# Patient Record
Sex: FEMALE | Race: BLACK OR AFRICAN AMERICAN | Employment: OTHER | URBAN - METROPOLITAN AREA
[De-identification: names, ages, dates, MRNs, and addresses within clinical notes are randomized per-mention and may not be internally consistent; named-entity substitution may affect disease eponyms.]

---

## 2017-02-02 ENCOUNTER — OFFICE VISIT (OUTPATIENT)
Dept: INTERNAL MEDICINE CLINIC | Age: 62
End: 2017-02-02

## 2017-02-02 VITALS
WEIGHT: 197.6 LBS | RESPIRATION RATE: 16 BRPM | HEIGHT: 65 IN | SYSTOLIC BLOOD PRESSURE: 112 MMHG | OXYGEN SATURATION: 98 % | HEART RATE: 63 BPM | BODY MASS INDEX: 32.92 KG/M2 | DIASTOLIC BLOOD PRESSURE: 68 MMHG | TEMPERATURE: 98.2 F

## 2017-02-02 DIAGNOSIS — M25.511 ACUTE PAIN OF RIGHT SHOULDER: ICD-10-CM

## 2017-02-02 DIAGNOSIS — E11.9 TYPE 2 DIABETES MELLITUS WITHOUT COMPLICATION, WITHOUT LONG-TERM CURRENT USE OF INSULIN (HCC): Primary | ICD-10-CM

## 2017-02-02 DIAGNOSIS — Z11.59 NEED FOR HEPATITIS C SCREENING TEST: ICD-10-CM

## 2017-02-02 DIAGNOSIS — I10 ESSENTIAL HYPERTENSION: ICD-10-CM

## 2017-02-02 RX ORDER — DICLOFENAC SODIUM 10 MG/G
GEL TOPICAL 4 TIMES DAILY
Qty: 100 G | Refills: 1 | Status: SHIPPED | OUTPATIENT
Start: 2017-02-02 | End: 2018-05-22

## 2017-02-02 NOTE — MR AVS SNAPSHOT
Visit Information Date & Time Provider Department Dept. Phone Encounter #  
 2/2/2017  8:15 AM Clau Galeas [address] [facility] [address] [phone] [id number] Follow-up Instructions Return in about 3 months (around 5/2/2017) for follow up diabetes. Your Appointments 5/19/2017  8:30 AM  
ROUTINE CARE with Belia Cortes MD  
[facility] Appt Note: well woman cp 0 sb 5/13/16  
 [address] [id number] [address] [place] [address] [address] [address] Upcoming Health Maintenance Date Due Hepatitis C Screening 1955 FOOT EXAM Q1 3/22/1965 Pneumococcal 19-64 Medium Risk (1 of 1 - PPSV23) 3/22/1974 DTaP/Tdap/Td series (1 - Tdap) 3/22/1976 ZOSTER VACCINE AGE 60> 3/22/2015 EYE EXAM RETINAL OR DILATED Q1 7/9/2015 INFLUENZA AGE 9 TO ADULT 8/1/2016 PAP AKA CERVICAL CYTOLOGY 2/4/2017 HEMOGLOBIN A1C Q6M 3/13/2017 MICROALBUMIN Q1 9/13/2017 LIPID PANEL Q1 9/13/2017 BREAST CANCER SCRN MAMMOGRAM 7/25/2018 COLONOSCOPY 3/12/2024 Allergies as of 2/2/2017  Review Complete On: 2/2/2017 By: Ananya Villarreal Severity Noted Reaction Type Reactions Percocet [Oxycodone-acetaminophen]  06/29/2011    Hives Current Immunizations  Never Reviewed No immunizations on file. Not reviewed this visit You Were Diagnosed With   
  
 Codes Comments Type 2 diabetes mellitus without complication, without long-term current use of insulin (HCC)    -  Primary ICD-10-CM: E11.9 ICD-9-CM: 250.00 Essential hypertension     ICD-10-CM: I10 
ICD-9-CM: 401.9 Need for hepatitis C screening test     ICD-10-CM: Z11.59 
ICD-9-CM: V73.89 Acute pain of right shoulder     ICD-10-CM: M25.511 ICD-9-CM: 719.41 Vitals BP Pulse Temp Resp Height(growth percentile) Weight(growth percentile) 112/68 (BP 1 Location: Left arm, BP Patient Position: Sitting) 63 98.2 °F (36.8 °C) (Oral) 16 5' 5\" (1.651 m) 197 lb 9.6 oz (89.6 kg) LMP SpO2 BMI OB Status Smoking Status 03/25/1999 98% 32.88 kg/m2 Hysterectomy Former Smoker BMI and BSA Data Body Mass Index Body Surface Area  
 32.88 kg/m 2 2.03 m 2 Preferred Pharmacy Pharmacy Name Phone [address] [place] [address], [facility] Trey Goddard 45 [phone] Your Updated Medication List  
  
   
This list is accurate as of: 2/2/17  9:08 AM.  Always use your most recent med list.  
  
  
  
  
 atenolol 25 mg tablet Commonly known as:  TENORMIN  
TAKE ONE TABLET BY MOUTH DAILY CRESTOR 10 mg tablet Generic drug:  rosuvastatin TAKE ONE TABLET BY MOUTH EVERY EVENING  
  
 diclofenac 1 % Gel Commonly known as:  VOLTAREN Apply  to affected area four (4) times daily. FISH OIL 1,000 mg Cap Generic drug:  omega-3 fatty acids-vitamin e Take 1 Cap by mouth. glucose blood VI test strips strip Commonly known as:  ONETOUCH ULTRA TEST Check blood sugars daily  Indications: twice daily  
  
 hydroCHLOROthiazide 25 mg tablet Commonly known as:  HYDRODIURIL  
TAKE ONE TABLET BY MOUTH DAILY  
  
 lisinopril 40 mg tablet Commonly known as:  PRINIVIL, ZESTRIL  
TAKE ONE TABLET BY MOUTH DAILY  
  
 metFORMIN 500 mg tablet Commonly known as:  GLUCOPHAGE  
TAKE ONE TABLET BY MOUTH DAILY Nisoldipine 34 mg Tb24 SR tablet Commonly known as:  SULAR  
TAKE ONE TABLET BY MOUTH DAILY  
  
 VITAMIN D2 50,000 unit capsule Generic drug:  ergocalciferol Take 1,000 Units by mouth. Prescriptions Sent to Pharmacy Refills  
 diclofenac (VOLTAREN) 1 % gel 1 Sig: Apply  to affected area four (4) times daily. Class: Normal  
 Pharmacy: [address] [place] [address] #: [phone] Route: Topical  
  
We Performed the Following HEMOGLOBIN A1C WITH EAG [70808 CPT(R)] HEPATITIS C AB [51819 CPT(R)] METABOLIC PANEL, COMPREHENSIVE [60691 CPT(R)] Follow-up Instructions Return in about 3 months (around 5/2/2017) for follow up diabetes. Introducing [facility] & HEALTH SERVICES! Dear Victor Manuel Rey: Thank you for requesting a "[employer] account. Our records indicate that you already have an active "[employer] account. You can access your account anytime at [email or web address] Did you know that you can access your hospital and ER discharge instructions at any time in "[employer]? You can also review all of your test results from your hospital stay or ER visit. Additional Information If you have questions, please visit the Frequently Asked Questions section of the "[employer] website at [email or web address] Remember, "[employer] is NOT to be used for urgent needs. For medical emergencies, dial 911. Now available from your iPhone and Android! Please provide this summary of care documentation to your next provider. Your primary care clinician is listed as Shola Mcclendon. If you have any questions after today's visit, please call [phone].

## 2017-02-02 NOTE — PROGRESS NOTES
1. Have you been to the ER, urgent care clinic since your last visit? Hospitalized since your last visit?no    2. Have you seen or consulted any other health care providers outside of the [facility]"" since your last visit? Include any pap smears or colon screening.  no

## 2017-02-02 NOTE — PROGRESS NOTES
SUBJECTIVE:   Ms. Aspen Trevino is a 64 y.o. female who is here for follow up of DM. Pt c/o right shoulder pain x one month. Pt states she was dx with frozen shoulder a few years ago. Pt notes the episodes normally go away after a while, but this one has not gone away. Pt denies decreased ROM, but notes it is painful to move her arm backwards. Pt reports the pain is worse with holding out at 90 degrees rather than full abduction. Pt endorses taking Advil without relief of pain. Pt's BP is well controlled at 112/68 today. Pt reports occasional lightheadedness and stumbling. Pt denies checking her BP during the episodes. Pt has lost 4 lbs since 9/13/16. Pt states she has an ophthalmology appointment on 2/13/17. Pt denies exercising \"as much as she should. \" Pt claims she will exercise regularly for a while, but then will stop. ROUTINE HEALTH MAINTENANCE:  PREVENTIVE:  Tdap: due, pt will consider. Pneumonia vaccine: pt will consider  Zostavax: declined today  Flu: declined today  Hep C: ordered today  Microfilament: scheduled  Ophthalmology: scheduled     At this time, she is otherwise doing well and has brought no other complaints to my attention today. For a list of the medical issues addressed today, see the assessment and plan below. PMH:   Past Medical History   Diagnosis Date    Diabetes ([facility] [address]     Hypercholesterolemia     Hypertension     Other ill-defined conditions(799.89)      high cholesterol     PSH:  has a past surgical history that includes tubal ligation; hysterectomy (3/25/99); and orthopaedic. All: is allergic to percocet [oxycodone-acetaminophen]. MEDS:   Current Outpatient Prescriptions   Medication Sig    diclofenac (VOLTAREN) 1 % gel Apply  to affected area four (4) times daily.     atenolol (TENORMIN) 25 mg tablet TAKE ONE TABLET BY MOUTH DAILY    lisinopril (PRINIVIL, ZESTRIL) 40 mg tablet TAKE ONE TABLET BY MOUTH DAILY    Nisoldipine 34 mg Tb24 TAKE ONE TABLET BY MOUTH DAILY    CRESTOR 10 mg tablet TAKE ONE TABLET BY MOUTH EVERY EVENING    glucose blood VI test strips (ONETOUCH ULTRA TEST) strip Check blood sugars daily  Indications: twice daily    hydrochlorothiazide (HYDRODIURIL) 25 mg tablet TAKE ONE TABLET BY MOUTH DAILY    metFORMIN (GLUCOPHAGE) 500 mg tablet TAKE ONE TABLET BY MOUTH DAILY    ergocalciferol (VITAMIN D2) 50,000 unit capsule Take 1,000 Units by mouth.  omega-3 fatty acids-vitamin e (FISH OIL) 1,000 mg cap Take 1 Cap by mouth. No current facility-administered medications for this visit. FH: family history includes Cancer in her father; Cancer (age of onset: 64) in her mother; Diabetes in her sister; Hypertension in her brother, brother, father, mother, sister, sister, and sister. SH:  reports that she quit smoking about 26 years ago. She quit after 15.00 years of use. She has never used smokeless tobacco. She reports that she drinks alcohol. Review of Systems - History obtained from the patient  General ROS: negative  Psychological ROS: negative  Ophthalmic ROS: negative  ENT ROS: negative  Respiratory ROS: no cough, shortness of breath, or wheezing  Cardiovascular ROS: no chest pain or dyspnea on exertion  Gastrointestinal ROS: no abdominal pain, change in bowel habits, or black or bloody stools  Genito-Urinary ROS: negative  Musculoskeletal ROS: +right shoulder pain, otherwise negative  Neurological ROS: negative  Dermatological ROS: negative    OBJECTIVE:   Vitals:   Visit Vitals    /68 (BP 1 Location: Left arm, BP Patient Position: Sitting)    Pulse 63    Temp 98.2 °F (36.8 °C) (Oral)    Resp 16    Ht 5' 5\" (1.651 m)    Wt 197 lb 9.6 oz (89.6 kg)    LMP 03/25/1999    SpO2 98%    BMI 32.88 kg/m2      Gen: Pleasant 64 y.o.  female in NAD. HEENT: NC/AT. HEART: RRR, No M/G/R. LUNGS: CTAB No W/R. EXTREMITIES: Warm. No C/C/E. NEURO: Alert and oriented x 3. Cranial nerves grossly intact.   No focal sensory or motor deficits noted. SKIN: Warm. Dry. No rashes or other lesions noted. ASSESSMENT/ PLAN:     Haydee Pedro was seen today for diabetes, follow-up, shoulder pain, other and other. Diagnoses and all orders for this visit:    Type 2 diabetes mellitus without complication, without long-term current use of insulin (HCC)  -     METABOLIC PANEL, COMPREHENSIVE  -     HEMOGLOBIN A1C WITH EAG    Essential hypertension  -     METABOLIC PANEL, COMPREHENSIVE    Need for hepatitis C screening test  -     HEPATITIS C AB    Acute pain of right shoulder  -     diclofenac (VOLTAREN) 1 % gel; Apply  to affected area four (4) times daily. Pt was advised to check her BP at home or work for 3-4 days, and report these readings back to me. If pt's BP is less than 112/68, then she was instructed to cut HCTZ 25mg in half. I ordered Hgb A1C, CMP, and Hep C labs for monitoring of DM, medication, and hepatitis C screening. Pt was advised to avoid heavy lifting and and decrease motion of the right shoulder for two weeks. I prescribed Voltaren 1% gel QID prn for right shoulder pain. Pt was instructed to use 2g at a time and apply with a cotton ball. She reports she is not ready to see orthopedics or do xrays at this time. Pt was encouraged to continue exercising and following a diabetic diet. Pt will f/u in 3 months for DM. Follow-up Disposition:  Return in about 3 months (around 5/2/2017) for follow up diabetes. I have reviewed the patient's medications and risks/side effects/benefits were discussed. Diagnosis(-es) explained to patient and questions answered. Literature provided where appropriate.      Written by Kori Elder, as dictated by Lucy Hayes MD.

## 2017-02-03 LAB
ALBUMIN SERPL-MCNC: 4.8 G/DL (ref 3.6–4.8)
ALBUMIN/GLOB SERPL: 1.8 {RATIO} (ref 1.1–2.5)
ALP SERPL-CCNC: 80 IU/L (ref 39–117)
ALT SERPL-CCNC: 24 IU/L (ref 0–32)
AST SERPL-CCNC: 21 IU/L (ref 0–40)
BILIRUB SERPL-MCNC: 0.3 MG/DL (ref 0–1.2)
BUN SERPL-MCNC: 9 MG/DL (ref 8–27)
BUN/CREAT SERPL: 13 (ref 11–26)
CALCIUM SERPL-MCNC: 9.9 MG/DL (ref 8.7–10.3)
CHLORIDE SERPL-SCNC: 100 MMOL/L (ref 96–106)
CO2 SERPL-SCNC: 23 MMOL/L (ref 18–29)
CREAT SERPL-MCNC: 0.7 MG/DL (ref 0.57–1)
EST. AVERAGE GLUCOSE BLD GHB EST-MCNC: 160 MG/DL
GLOBULIN SER CALC-MCNC: 2.6 G/DL (ref 1.5–4.5)
GLUCOSE SERPL-MCNC: 116 MG/DL (ref 65–99)
HBA1C MFR BLD: 7.2 % (ref 4.8–5.6)
HCV AB S/CO SERPL IA: <0.1 S/CO RATIO (ref 0–0.9)
POTASSIUM SERPL-SCNC: 4.1 MMOL/L (ref 3.5–5.2)
PROT SERPL-MCNC: 7.4 G/DL (ref 6–8.5)
SODIUM SERPL-SCNC: 145 MMOL/L (ref 134–144)

## 2017-02-08 ENCOUNTER — NURSE NAVIGATOR (OUTPATIENT)
Dept: INTERNAL MEDICINE CLINIC | Age: 62
End: 2017-02-08

## 2017-02-08 NOTE — PROGRESS NOTES
Patient followed up with PCP on 2/2/17. Navigation type closed. Episode resolved from 11/29/16. No further follow up scheduled.

## 2017-02-09 ENCOUNTER — TELEPHONE (OUTPATIENT)
Dept: INTERNAL MEDICINE CLINIC | Age: 62
End: 2017-02-09

## 2017-02-27 DIAGNOSIS — I15.9 SECONDARY HYPERTENSION: ICD-10-CM

## 2017-02-27 RX ORDER — ATENOLOL 25 MG/1
TABLET ORAL
Qty: 30 TAB | Refills: 0 | Status: SHIPPED | OUTPATIENT
Start: 2017-02-27 | End: 2017-02-28 | Stop reason: SDUPTHER

## 2017-02-28 DIAGNOSIS — I15.9 SECONDARY HYPERTENSION: ICD-10-CM

## 2017-03-01 RX ORDER — HYDROCHLOROTHIAZIDE 25 MG/1
TABLET ORAL
Qty: 30 TAB | Refills: 5 | Status: SHIPPED | OUTPATIENT
Start: 2017-03-01 | End: 2017-08-30 | Stop reason: SDUPTHER

## 2017-03-02 RX ORDER — ATENOLOL 25 MG/1
TABLET ORAL
Qty: 30 TAB | Refills: 0 | Status: SHIPPED | OUTPATIENT
Start: 2017-03-02 | End: 2017-05-02 | Stop reason: SDUPTHER

## 2017-05-02 DIAGNOSIS — I15.9 SECONDARY HYPERTENSION: ICD-10-CM

## 2017-05-02 RX ORDER — ATENOLOL 25 MG/1
TABLET ORAL
Qty: 30 TAB | Refills: 0 | Status: SHIPPED | OUTPATIENT
Start: 2017-05-02 | End: 2017-05-28 | Stop reason: SDUPTHER

## 2017-05-19 ENCOUNTER — OFFICE VISIT (OUTPATIENT)
Dept: SURGERY | Age: 62
End: 2017-05-19

## 2017-05-19 VITALS
RESPIRATION RATE: 16 BRPM | WEIGHT: 191.4 LBS | TEMPERATURE: 97 F | HEART RATE: 69 BPM | SYSTOLIC BLOOD PRESSURE: 117 MMHG | HEIGHT: 65 IN | BODY MASS INDEX: 31.89 KG/M2 | OXYGEN SATURATION: 99 % | DIASTOLIC BLOOD PRESSURE: 57 MMHG

## 2017-05-19 DIAGNOSIS — Z01.419 ENCOUNTER FOR ROUTINE GYNECOLOGICAL EXAMINATION WITH PAPANICOLAOU SMEAR OF CERVIX: Primary | ICD-10-CM

## 2017-05-19 DIAGNOSIS — N95.9 MENOPAUSAL AND POSTMENOPAUSAL DISORDER: ICD-10-CM

## 2017-05-19 DIAGNOSIS — Z90.710 HISTORY OF HYSTERECTOMY INCLUDING CERVIX: ICD-10-CM

## 2017-05-19 NOTE — MR AVS SNAPSHOT
Visit Information Date & Time Provider Department Dept. Phone Encounter #  
 5/19/2017  8:30 AM Chikis Rod, [facility] [address] [id number] Follow-up Instructions Return in about 1 year (around 5/19/2018), or if symptoms worsen or fail to improve. Your Appointments 6/28/2017  9:15 AM  
ROUTINE CARE with Wilfred Ventura MD  
[facility] [address]) Appt Note: low b/p  
 [address] [address]  
[address] [facility] [address] [place] [address] Upcoming Health Maintenance Date Due  
 FOOT EXAM Q1 3/22/1965 Pneumococcal 19-64 Medium Risk (1 of 1 - PPSV23) 3/22/1974 DTaP/Tdap/Td series (1 - Tdap) 3/22/1976 ZOSTER VACCINE AGE 60> 3/22/2015 EYE EXAM RETINAL OR DILATED Q1 7/9/2015 PAP AKA CERVICAL CYTOLOGY 2/4/2017 INFLUENZA AGE 9 TO ADULT 8/1/2017 HEMOGLOBIN A1C Q6M 8/2/2017 MICROALBUMIN Q1 9/13/2017 LIPID PANEL Q1 9/13/2017 BREAST CANCER SCRN MAMMOGRAM 7/25/2018 COLONOSCOPY 3/12/2024 Allergies as of 5/19/2017  Review Complete On: 5/19/2017 By: Chikis Rod MD  
  
 Severity Noted Reaction Type Reactions Percocet [Oxycodone-acetaminophen]  06/29/2011    Hives Current Immunizations  Never Reviewed No immunizations on file. Not reviewed this visit You Were Diagnosed With   
  
 Codes Comments Encounter for routine gynecological examination with Papanicolaou smear of cervix    -  Primary ICD-10-CM: D60.458 ICD-9-CM: V72.31, V76.2 Menopausal and postmenopausal disorder     ICD-10-CM: N95.9 ICD-9-CM: 627.9 History of hysterectomy including cervix     ICD-10-CM: Z90.710 ICD-9-CM: V88.01 Vitals BP Pulse Temp Resp Height(growth percentile) Weight(growth percentile) 117/57 69 97 °F (36.1 °C) (Oral) 16 5' 5\" (1.651 m) 191 lb 6.4 oz (86.8 kg) LMP SpO2 BMI OB Status Smoking Status 03/25/1999 99% 31.85 kg/m2 Hysterectomy Former Smoker Vitals History BMI and BSA Data Body Mass Index Body Surface Area  
 31.85 kg/m 2 2 m 2 [facility] [address] [facility] [phone] Your Updated Medication List  
  
   
This list is accurate as of: 5/19/17  9:05 AM.  Always use your most recent med list.  
  
  
  
  
 atenolol 25 mg tablet Commonly known as:  TENORMIN  
TAKE ONE TABLET BY MOUTH DAILY CRESTOR 10 mg tablet Generic drug:  rosuvastatin TAKE ONE TABLET BY MOUTH EVERY EVENING  
  
 diclofenac 1 % Gel Commonly known as:  VOLTAREN Apply  to affected area four (4) times daily. FISH OIL 1,000 mg Cap Generic drug:  omega-3 fatty acids-vitamin e Take 1 Cap by mouth. glucose blood VI test strips strip Commonly known as:  ONETOUCH ULTRA TEST Check blood sugars daily  Indications: twice daily * hydroCHLOROthiazide 25 mg tablet Commonly known as:  HYDRODIURIL  
TAKE ONE TABLET BY MOUTH DAILY * hydroCHLOROthiazide 25 mg tablet Commonly known as:  HYDRODIURIL  
TAKE ONE TABLET BY MOUTH DAILY  
  
 lisinopril 40 mg tablet Commonly known as:  PRINIVIL, ZESTRIL  
TAKE ONE TABLET BY MOUTH DAILY  
  
 metFORMIN 500 mg tablet Commonly known as:  GLUCOPHAGE  
TAKE ONE TABLET BY MOUTH DAILY Nisoldipine 34 mg Tb24 SR tablet Commonly known as:  SULAR  
TAKE ONE TABLET BY MOUTH DAILY  
  
 VITAMIN D2 50,000 unit capsule Generic drug:  ergocalciferol Take 1,000 Units by mouth. * Notice: This list has 2 medication(s) that are the same as other medications prescribed for you. Read the directions carefully, and ask your doctor or other care provider to review them with you. We Performed the Following PAP, LB, RFX HPV [id number]) [id number] CPT(R)] Follow-up Instructions Return in about 1 year (around 5/19/2018), or if symptoms worsen or fail to improve. To-Do List   
 07/25/2017 Imaging:  DEVIN MAMMO BI SCREENING INCL CAD Introducing [facility] & HEALTH SERVICES! Dear Ab Grullon: Thank you for requesting a [employer] account. Our records indicate that you already have an active [employer] account. You can access your account anytime at [email or web address] Did you know that you can access your hospital and ER discharge instructions at any time in [employer]? You can also review all of your test results from your hospital stay or ER visit. Additional Information If you have questions, please visit the Frequently Asked Questions section of the [employer] website at [email or web address] Remember, [employer] is NOT to be used for urgent needs. For medical emergencies, dial 911. Now available from your iPhone and Android! Please provide this summary of care documentation to your next provider. Your primary care clinician is listed as Marysol Cervantes. If you have any questions after today's visit, please call [phone].

## 2017-05-23 LAB
CYTOLOGIST CVX/VAG CYTO: NORMAL
DX ICD CODE: NORMAL
LABCORP, 190119: NORMAL
Lab: NORMAL
OTHER STN SPEC: NORMAL
PATH REPORT.FINAL DX SPEC: NORMAL
STAT OF ADQ CVX/VAG CYTO-IMP: NORMAL

## 2017-05-28 DIAGNOSIS — I15.9 SECONDARY HYPERTENSION: ICD-10-CM

## 2017-05-30 RX ORDER — ATENOLOL 25 MG/1
TABLET ORAL
Qty: 30 TAB | Refills: 0 | Status: SHIPPED | OUTPATIENT
Start: 2017-05-30 | End: 2017-06-13 | Stop reason: SDUPTHER

## 2017-06-13 ENCOUNTER — OFFICE VISIT (OUTPATIENT)
Dept: INTERNAL MEDICINE CLINIC | Age: 62
End: 2017-06-13

## 2017-06-13 VITALS
RESPIRATION RATE: 18 BRPM | HEART RATE: 61 BPM | BODY MASS INDEX: 31.65 KG/M2 | OXYGEN SATURATION: 98 % | WEIGHT: 190 LBS | TEMPERATURE: 98.3 F | DIASTOLIC BLOOD PRESSURE: 70 MMHG | SYSTOLIC BLOOD PRESSURE: 125 MMHG | HEIGHT: 65 IN

## 2017-06-13 DIAGNOSIS — E08.9 DIABETES MELLITUS DUE TO UNDERLYING CONDITION WITHOUT COMPLICATION, UNSPECIFIED LONG TERM INSULIN USE STATUS: ICD-10-CM

## 2017-06-13 DIAGNOSIS — M75.01 ADHESIVE CAPSULITIS OF RIGHT SHOULDER: ICD-10-CM

## 2017-06-13 DIAGNOSIS — E78.2 MIXED HYPERLIPIDEMIA: Primary | ICD-10-CM

## 2017-06-13 DIAGNOSIS — I10 ESSENTIAL HYPERTENSION: ICD-10-CM

## 2017-06-13 DIAGNOSIS — E11.9 TYPE 2 DIABETES MELLITUS WITHOUT COMPLICATION, WITHOUT LONG-TERM CURRENT USE OF INSULIN (HCC): ICD-10-CM

## 2017-06-13 RX ORDER — ATENOLOL 25 MG/1
TABLET ORAL
Qty: 90 TAB | Refills: 2 | Status: SHIPPED | OUTPATIENT
Start: 2017-06-13 | End: 2018-05-22

## 2017-06-13 NOTE — PROGRESS NOTES
SUBJECTIVE:   Ms. Tyrell Self is a 58 y.o. female who is here c/o shoulder pain. Pt is fasting. Pt c/o R shoulder pain. Pt states the pain is about a 5/10. Pt states use of Diclofenac gel x3 days in March resolved the pain. Pt claims she tried using the Diclofenac gel again at the end of May for one week, but it did not provide complete relief. Pt states that she has been unable to raise her arm above her shoulder since May. Pt's BP is well controlled at 131/76 and 125/70 upon recheck today. Pt denies checking BP at home. Pt states her BP was 117/57 at Dr. Shanon Riddle (gyn) office in May. Pt notes her DBP is usually in the 60s. Pt reports taking htn medication at 6am, Metformin at lunch time, and cholesterol medication at night. Pt endorses watching her salt intake. Pt denies use of OTC antiinflammatories. Pt reports walking for exercise. Pt has lost 1lb since March. At this time, she is otherwise doing well and has brought no other complaints to my attention today. For a list of the medical issues addressed today, see the assessment and plan below. PMH:   Past Medical History:   Diagnosis Date    Diabetes ([facility] [address]     Hypercholesterolemia     Hypertension     Other ill-defined conditions     high cholesterol     PSH:  has a past surgical history that includes tubal ligation; hysterectomy (3/25/99); and orthopaedic. All: is allergic to percocet [oxycodone-acetaminophen].    MEDS:   Current Outpatient Prescriptions   Medication Sig    glucose blood VI test strips (ONETOUCH ULTRA TEST) strip Check blood sugars daily  Indications: twice daily    atenolol (TENORMIN) 25 mg tablet TAKE 1 TABLET BY MOUTH ONCE DAILY    CRESTOR 10 mg tablet TAKE ONE TABLET BY MOUTH EVERY EVENING    lisinopril (PRINIVIL, ZESTRIL) 40 mg tablet TAKE ONE TABLET BY MOUTH DAILY    metFORMIN (GLUCOPHAGE) 500 mg tablet TAKE ONE TABLET BY MOUTH DAILY    Nisoldipine (SULAR) 34 mg Tb24 SR tablet TAKE ONE TABLET BY MOUTH DAILY    hydroCHLOROthiazide (HYDRODIURIL) 25 mg tablet TAKE ONE TABLET BY MOUTH DAILY    diclofenac (VOLTAREN) 1 % gel Apply  to affected area four (4) times daily.  hydrochlorothiazide (HYDRODIURIL) 25 mg tablet TAKE ONE TABLET BY MOUTH DAILY    ergocalciferol (VITAMIN D2) 50,000 unit capsule Take 1,000 Units by mouth.  omega-3 fatty acids-vitamin e (FISH OIL) 1,000 mg cap Take 1 Cap by mouth. No current facility-administered medications for this visit. FH: family history includes Cancer in her father; Cancer (age of onset: 64) in her mother; Diabetes in her sister; Hypertension in her brother, brother, father, mother, sister, sister, and sister. SH:  reports that she quit smoking about 27 years ago. She quit after 15.00 years of use. She has never used smokeless tobacco. She reports that she drinks alcohol. Review of Systems - History obtained from the patient  General ROS: negative  Psychological ROS: negative  Ophthalmic ROS: negative  ENT ROS: negative  Respiratory ROS: no cough, shortness of breath, or wheezing  Cardiovascular ROS: no chest pain or dyspnea on exertion  Gastrointestinal ROS: no abdominal pain, change in bowel habits, or black or bloody stools  Genito-Urinary ROS: negative  Musculoskeletal ROS: +R shoulder pain, otherwise negative  Neurological ROS: negative  Dermatological ROS: negative    OBJECTIVE:   Vitals:   Visit Vitals    /70    Pulse 61    Temp 98.3 °F (36.8 °C) (Oral)    Resp 18    Ht 5' 5\" (1.651 m)    Wt 190 lb (86.2 kg)    LMP 03/25/1999    SpO2 98%    BMI 31.62 kg/m2      Gen: Pleasant 58 y.o.  female in NAD. HEENT: NC/AT. HEART: RRR, No M/G/R. LUNGS: CTAB No W/R. EXTREMITIES: Warm. No C/C/E. NEURO: Alert and oriented x 3. Cranial nerves grossly intact. No focal sensory or motor deficits noted. SKIN: Warm. Dry. No rashes or other lesions noted.     ASSESSMENT/ PLAN:   Kay Zapien was seen today for shoulder pain and hypertension. Diagnoses and all orders for this visit:    Mixed hyperlipidemia  -     LIPID PANEL    Type 2 diabetes mellitus without complication, without long-term current use of insulin (HCC)  -     HEMOGLOBIN A1C WITH EAG    Diabetes mellitus due to underlying condition without complication, unspecified long term insulin use status  -     glucose blood VI test strips (ONETOUCH ULTRA TEST) strip; Check blood sugars daily  Indications: twice daily    Essential hypertension  -     atenolol (TENORMIN) 25 mg tablet; TAKE 1 TABLET BY MOUTH ONCE DAILY    Adhesive capsulitis of right shoulder  -     REFERRAL TO ORTHOPEDICS      I ordered a lipid panel for monitoring of hld. I ordered an A1C lab for monitoring of DM. I refilled test strips for continued monitoring of DM. This patient's blood pressure is stable and controlled on the current medication. Continue the current regimen. For continued management of htn, I refilled pt's Atenolol. I advised pt to begin checking BP regularly at home. Pt was referred to Dr. Bhavya Villegas (orthopedics) for adhesive capsulitis of her right shoulder. Pt will f/u in 3 months for DM, htn, and hld. Follow-up Disposition:  Return in about 3 months (around 9/13/2017) for follow up diabetes, htn , and hld. I have reviewed the patient's medications and risks/side effects/benefits were discussed. Diagnosis(-es) explained to patient and questions answered. Literature provided where appropriate.      Written by Shree Saldaña, as dictated by Frankey Level, MD.

## 2017-06-13 NOTE — MR AVS SNAPSHOT
Visit Information Date & Time Provider Department Dept. Phone Encounter #  
 6/13/2017  8:45 AM Wilfred Ventura, [address] [facility] [phone] [id number] Follow-up Instructions Return in about 3 months (around 9/13/2017) for follow up diabetes, htn , and hld. Your Appointments 5/22/2018  8:30 AM  
ROUTINE CARE with Chikis Rod MD  
[facility] Appt Note: Well Woman $0CP SL 5.19.17  
 [address] [address] [address] [place] [address] [address] [address] Upcoming Health Maintenance Date Due  
 FOOT EXAM Q1 3/22/1965 Pneumococcal 19-64 Medium Risk (1 of 1 - PPSV23) 3/22/1974 DTaP/Tdap/Td series (1 - Tdap) 3/22/1976 ZOSTER VACCINE AGE 60> 3/22/2015 EYE EXAM RETINAL OR DILATED Q1 7/9/2015 INFLUENZA AGE 9 TO ADULT 8/1/2017 HEMOGLOBIN A1C Q6M 8/2/2017 MICROALBUMIN Q1 9/13/2017 LIPID PANEL Q1 9/13/2017 BREAST CANCER SCRN MAMMOGRAM 7/25/2018 PAP AKA CERVICAL CYTOLOGY 5/19/2020 COLONOSCOPY 3/12/2024 Allergies as of 6/13/2017  Review Complete On: 6/13/2017 By: Wilfred Ventura MD  
  
 Severity Noted Reaction Type Reactions Percocet [Oxycodone-acetaminophen]  06/29/2011    Hives Current Immunizations  Never Reviewed No immunizations on file. Not reviewed this visit You Were Diagnosed With   
  
 Codes Comments Mixed hyperlipidemia    -  Primary ICD-10-CM: B05.6 ICD-9-CM: 272.2 Type 2 diabetes mellitus without complication, without long-term current use of insulin (HCC)     ICD-10-CM: E11.9 ICD-9-CM: 250.00 Diabetes mellitus due to underlying condition without complication, unspecified long term insulin use status     ICD-10-CM: E08.9 ICD-9-CM: 249.00 Essential hypertension     ICD-10-CM: I10 
ICD-9-CM: 401.9 Adhesive capsulitis of right shoulder     ICD-10-CM: M75.01 
ICD-9-CM: 726.0 Vitals BP Pulse Temp Resp Height(growth percentile) Weight(growth percentile) 125/70 61 98.3 °F (36.8 °C) (Oral) 18 5' 5\" (1.651 m) 190 lb (86.2 kg) LMP SpO2 BMI OB Status Smoking Status 03/25/1999 98% 31.62 kg/m2 Hysterectomy Former Smoker Vitals History BMI and BSA Data Body Mass Index Body Surface Area  
 31.62 kg/m 2 1.99 m 2 Preferred Pharmacy Pharmacy Name Phone Ashley Paul [facility] [address], [address] [facility] [phone] Your Updated Medication List  
  
   
This list is accurate as of: 6/13/17 10:21 AM.  Always use your most recent med list.  
  
  
  
  
 atenolol 25 mg tablet Commonly known as:  TENORMIN  
TAKE 1 TABLET BY MOUTH ONCE DAILY CRESTOR 10 mg tablet Generic drug:  rosuvastatin TAKE ONE TABLET BY MOUTH EVERY EVENING  
  
 diclofenac 1 % Gel Commonly known as:  VOLTAREN Apply  to affected area four (4) times daily. FISH OIL 1,000 mg Cap Generic drug:  omega-3 fatty acids-vitamin e Take 1 Cap by mouth. glucose blood VI test strips strip Commonly known as:  ONETOUCH ULTRA TEST Check blood sugars daily  Indications: twice daily * hydroCHLOROthiazide 25 mg tablet Commonly known as:  HYDRODIURIL  
TAKE ONE TABLET BY MOUTH DAILY * hydroCHLOROthiazide 25 mg tablet Commonly known as:  HYDRODIURIL  
TAKE ONE TABLET BY MOUTH DAILY  
  
 lisinopril 40 mg tablet Commonly known as:  PRINIVIL, ZESTRIL  
TAKE ONE TABLET BY MOUTH DAILY  
  
 metFORMIN 500 mg tablet Commonly known as:  GLUCOPHAGE  
TAKE ONE TABLET BY MOUTH DAILY Nisoldipine 34 mg Tb24 SR tablet Commonly known as:  SULAR  
TAKE ONE TABLET BY MOUTH DAILY  
  
 VITAMIN D2 50,000 unit capsule Generic drug:  ergocalciferol Take 1,000 Units by mouth. * Notice: This list has 2 medication(s) that are the same as other medications prescribed for you.  Read the directions carefully, and ask your doctor or other care provider to review them with you. Prescriptions Sent to Pharmacy Refills  
 glucose blood VI test strips (ONETOUCH ULTRA TEST) strip 11 Sig: Check blood sugars daily  Indications: twice daily Class: Normal  
 Pharmacy: [facility] [address] [facility] [address] [facility] Ph #: [phone]  
 atenolol (TENORMIN) 25 mg tablet 2 Sig: TAKE 1 TABLET BY MOUTH ONCE DAILY Class: Normal  
 Pharmacy: [facility] [address] [address] [facility] Ph #: [phone] We Performed the Following HEMOGLOBIN A1C WITH EAG [25522 CPT(R)] LIPID PANEL [80208 CPT(R)] REFERRAL TO ORTHOPEDICS [KTA667 Custom] Comments:  
 Please evaluate patient for a frozen shoulder. Follow-up Instructions Return in about 3 months (around 9/13/2017) for follow up diabetes, htn , and hld. Referral Information Referral ID Referred By Referred To  
  
 [id number] Melanai Andrade, [address] Visits Status Start Date End Date 1 New Request 6/13/17 6/13/18 If your referral has a status of pending review or denied, additional information will be sent to support the outcome of this decision. Introducing [facility]! Dear Ying Mensah: Thank you for requesting a DocLand[employer] account. Our records indicate that you already have an active DocLanding account. You can access your account anytime at [email or web address] Did you know that you can access your hospital and ER discharge instructions at any time in DocLanding? You can also review all of your test results from your hospital stay or ER visit. Additional Information If you have questions, please visit the Frequently Asked Questions section of the DocLanding website at [email or web address] Remember, MyChart is NOT to be used for urgent needs. For medical emergencies, dial 911. Now available from your iPhone and Android! Please provide this summary of care documentation to your next provider. Your primary care clinician is listed as Apryl Naqvi. If you have any questions after today's visit, please call [phone].

## 2017-06-14 LAB
CHOLEST SERPL-MCNC: 181 MG/DL (ref 100–199)
EST. AVERAGE GLUCOSE BLD GHB EST-MCNC: 146 MG/DL
HBA1C MFR BLD: 6.7 % (ref 4.8–5.6)
HDLC SERPL-MCNC: 67 MG/DL
LDLC SERPL CALC-MCNC: 93 MG/DL (ref 0–99)
TRIGL SERPL-MCNC: 104 MG/DL (ref 0–149)
VLDLC SERPL CALC-MCNC: 21 MG/DL (ref 5–40)

## 2017-07-02 DIAGNOSIS — I15.9 SECONDARY HYPERTENSION: ICD-10-CM

## 2017-07-02 DIAGNOSIS — I10 ESSENTIAL HYPERTENSION: ICD-10-CM

## 2017-07-03 RX ORDER — ATENOLOL 25 MG/1
TABLET ORAL
Qty: 30 TAB | Refills: 0 | OUTPATIENT
Start: 2017-07-03

## 2017-08-31 RX ORDER — HYDROCHLOROTHIAZIDE 25 MG/1
TABLET ORAL
Qty: 90 TAB | Refills: 2 | Status: SHIPPED | OUTPATIENT
Start: 2017-08-31 | End: 2018-04-20 | Stop reason: SDUPTHER

## 2017-09-06 RX ORDER — HYDROCHLOROTHIAZIDE 25 MG/1
TABLET ORAL
Qty: 90 TAB | Refills: 2 | Status: CANCELLED | OUTPATIENT
Start: 2017-09-06

## 2017-09-28 ENCOUNTER — HOSPITAL ENCOUNTER (OUTPATIENT)
Dept: MAMMOGRAPHY | Age: 62
Discharge: HOME | End: 2017-09-28
Attending: OBSTETRICS & GYNECOLOGY
Payer: COMMERCIAL

## 2017-09-28 DIAGNOSIS — Z01.419 ENCOUNTER FOR ROUTINE GYNECOLOGICAL EXAMINATION WITH PAPANICOLAOU SMEAR OF CERVIX: ICD-10-CM

## 2017-09-28 PROCEDURE — 77067 SCR MAMMO BI INCL CAD: CPT

## 2017-10-05 ENCOUNTER — HOSPITAL ENCOUNTER (OUTPATIENT)
Dept: NON INVASIVE DIAGNOSTICS | Age: 62
Discharge: HOME | End: 2017-10-05
Payer: COMMERCIAL

## 2017-10-05 DIAGNOSIS — S46.011A STRAIN OF TENDON OF RIGHT ROTATOR CUFF: ICD-10-CM

## 2017-10-05 LAB
ATRIAL RATE: 61 BPM
CALCULATED P AXIS, ECG09: 23 DEGREES
CALCULATED R AXIS, ECG10: 11 DEGREES
CALCULATED T AXIS, ECG11: 21 DEGREES
DIAGNOSIS, 93000: NORMAL
P-R INTERVAL, ECG05: 132 MS
Q-T INTERVAL, ECG07: 404 MS
QRS DURATION, ECG06: 84 MS
QTC CALCULATION (BEZET), ECG08: 406 MS
VENTRICULAR RATE, ECG03: 61 BPM

## 2017-10-05 PROCEDURE — 93005 ELECTROCARDIOGRAM TRACING: CPT

## 2017-10-05 NOTE — TELEPHONE ENCOUNTER
#[phone] [facility] states atenolol is no longer available. Please call in something similar.

## 2017-10-06 RX ORDER — METOPROLOL TARTRATE 25 MG/1
25 TABLET, FILM COATED ORAL 2 TIMES DAILY
Qty: 180 TAB | Refills: 2 | Status: SHIPPED | OUTPATIENT
Start: 2017-10-06 | End: 2018-04-20 | Stop reason: SDUPTHER

## 2017-11-21 RX ORDER — METFORMIN HYDROCHLORIDE 500 MG/1
TABLET ORAL
Qty: 90 TAB | Refills: 3 | Status: SHIPPED | OUTPATIENT
Start: 2017-11-21 | End: 2018-11-23 | Stop reason: SDUPTHER

## 2018-04-20 ENCOUNTER — OFFICE VISIT (OUTPATIENT)
Dept: INTERNAL MEDICINE CLINIC | Age: 63
End: 2018-04-20

## 2018-04-20 VITALS
RESPIRATION RATE: 16 BRPM | OXYGEN SATURATION: 97 % | SYSTOLIC BLOOD PRESSURE: 107 MMHG | TEMPERATURE: 97.8 F | DIASTOLIC BLOOD PRESSURE: 68 MMHG | BODY MASS INDEX: 31.49 KG/M2 | HEIGHT: 65 IN | WEIGHT: 189 LBS | HEART RATE: 86 BPM

## 2018-04-20 DIAGNOSIS — E11.9 TYPE 2 DIABETES MELLITUS WITHOUT COMPLICATION, WITHOUT LONG-TERM CURRENT USE OF INSULIN (HCC): Primary | ICD-10-CM

## 2018-04-20 DIAGNOSIS — E78.2 MIXED HYPERLIPIDEMIA: ICD-10-CM

## 2018-04-20 DIAGNOSIS — I10 ESSENTIAL HYPERTENSION: ICD-10-CM

## 2018-04-20 LAB — HBA1C MFR BLD HPLC: 6.8 %

## 2018-04-20 RX ORDER — METOPROLOL TARTRATE 25 MG/1
25 TABLET, FILM COATED ORAL 2 TIMES DAILY
Qty: 180 TAB | Refills: 2 | Status: SHIPPED | OUTPATIENT
Start: 2018-04-20 | End: 2019-03-26 | Stop reason: SDUPTHER

## 2018-04-20 RX ORDER — HYDROCHLOROTHIAZIDE 25 MG/1
TABLET ORAL
Qty: 90 TAB | Refills: 2 | Status: SHIPPED | OUTPATIENT
Start: 2018-04-20 | End: 2018-05-22 | Stop reason: SDUPTHER

## 2018-04-20 RX ORDER — ROSUVASTATIN CALCIUM 10 MG/1
TABLET, COATED ORAL
Qty: 30 TAB | Refills: 11 | Status: SHIPPED | OUTPATIENT
Start: 2018-04-20 | End: 2018-05-04 | Stop reason: SDUPTHER

## 2018-04-20 RX ORDER — BLOOD-GLUCOSE METER
EACH MISCELLANEOUS
Qty: 1 EACH | Refills: 0 | Status: SHIPPED | OUTPATIENT
Start: 2018-04-20

## 2018-04-20 NOTE — PROGRESS NOTES
SUBJECTIVE:   Ms. Mulugeta Barker is a 61 y.o. female who is here for follow up of routine medical issues. Pt c/o gas after eating certain foods, specifically cabbage and onions. HTN: Pt is compliant in taking lisinopril, metoprolol tartrate, nisoldipine, atenolol, and HCTZ. Patient denies chest pain, VALDEZ/SOB, edema, headache, visual changes, dizziness, palpitations or syncope. Pt's BP in the office today was 107/68. DM: Pt is compliant in taking metformin. Patient denies fatigue, dizziness, polydipsia, polyuria, polyphagia, pancreatitis, increased sugar consumption, sore/bleeding gums, blurred vision, or cuts that will not heal. Pt denies numbness/tingling in extremities. Last HgA1c was 6.7 on 6/13/2017; today her POC HgA1c was 6.8. She denies regular exercise, but plans on being more active in the warmer weather. She tries to eat a diabetic diet. She reports her glucose is typically highest in the morning. She notes she eats crackers occasionally at night. HLD: Pt is compliant taking rosuvastatin. Pt inquired if she should continue to take a fish oil supplement. Patient denies abdominal pain, nausea, vomiting, diarrhea, arthralgias/myalgias due to the medication. Last lipid panel was normal.     Pt inquired about taking antibiotics due h/o of possible heart murmur. PREVENTIVE:  Mammogram: current - 9/28/2017  Dexa: last in 2014  Tdap: declined   Pneumonia vaccine: declined   Shingles: declined   Ophthalmology: Feb 2018    At this time, she is otherwise doing well and has brought no other complaints to my attention today. For a list of the medical issues addressed today, see the assessment and plan below.     PMH:   Past Medical History:   Diagnosis Date    Diabetes ([address]     Hypercholesterolemia     Hypertension     Other ill-defined conditions(799.89)     high cholesterol     PSH:  has a past surgical history that includes hx tubal ligation; hx hysterectomy (3/25/99); hx orthopaedic; and hx rotator cuff repair (10/2017). All: is allergic to percocet [oxycodone-acetaminophen]. MEDS:   Current Outpatient Prescriptions   Medication Sig    metoprolol tartrate (LOPRESSOR) 25 mg tablet Take 1 Tab by mouth two (2) times a day.  hydroCHLOROthiazide (HYDRODIURIL) 25 mg tablet TAKE ONE TABLET BY MOUTH DAILY    rosuvastatin (CRESTOR) 10 mg tablet TAKE ONE TABLET BY MOUTH EVERY EVENING    lisinopril (PRINIVIL, ZESTRIL) 40 mg tablet TAKE 1 TABLET BY MOUTH EVERY DAY    metFORMIN (GLUCOPHAGE) 500 mg tablet TAKE ONE TABLET BY MOUTH DAILY    Nisoldipine (SULAR) 34 mg Tb24 SR tablet TAKE 1 TABLET BY MOUTH ONCE DAILY    glucose blood VI test strips (ONETOUCH ULTRA TEST) strip Check blood sugars daily  Indications: twice daily    atenolol (TENORMIN) 25 mg tablet TAKE 1 TABLET BY MOUTH ONCE DAILY    diclofenac (VOLTAREN) 1 % gel Apply  to affected area four (4) times daily.  hydrochlorothiazide (HYDRODIURIL) 25 mg tablet TAKE ONE TABLET BY MOUTH DAILY    ergocalciferol (VITAMIN D2) 50,000 unit capsule Take 1,000 Units by mouth.  omega-3 fatty acids-vitamin e (FISH OIL) 1,000 mg cap Take 1 Cap by mouth. No current facility-administered medications for this visit. FH: family history includes Breast Cancer in her paternal aunt; Cancer in her father; Cancer (age of onset: 64) in her mother; Diabetes in her sister; Hypertension in her brother, brother, father, mother, sister, sister, and sister. SH:  reports that she quit smoking about 28 years ago. She quit after 15.00 years of use. She has never used smokeless tobacco. She reports that she drinks alcohol.      Review of Systems - History obtained from the patient  General ROS: no fever, chills, fatigue, body aches  Psychological ROS: no change in anxiety, depression, SI/HI  Ophthalmic ROS: no blurred vision, myopia, double vision  ENT ROS: no dysphagia, otalgia, otorrhea, rhinorrhea, post nasal drip  Respiratory ROS: no cough, shortness of breath, or wheezing  Cardiovascular ROS: no chest pain or dyspnea on exertion  Gastrointestinal ROS: no abdominal pain, change in bowel habits, or black or bloody stools  Genito-Urinary ROS: no frequency, urgency, incontinence, dysuria, hematouria  Musculoskeletal ROS: no arthralagia, myalgia  Neurological ROS: no headaches, dizziness, lightheadedness, tremors, seizures  Dermatological ROS: no rash or lesions    OBJECTIVE:   Vitals:   Visit Vitals    /68 (BP 1 Location: Left arm, BP Patient Position: Sitting)    Pulse 86    Temp 97.8 °F (36.6 °C) (Oral)    Resp 16    Ht 5' 5\" (1.651 m)    Wt 189 lb (85.7 kg)    LMP 03/25/1999    SpO2 97%    BMI 31.45 kg/m2      Gen: Pleasant 61 y.o.  female in NAD. HEENT: PERRLA. EOMI. OP moist and pink. Neck: Supple. No LAD. HEART: RRR, No M/G/R.      LUNGS: CTAB No W/R. ABDOMEN: S, NT, ND, BS+. EXTREMITIES: Warm. No C/C/E.    MUSCULOSKELETAL: Normal ROM, muscle strength 5/5 all groups. NEURO: Alert and oriented x 3. Cranial nerves grossly intact. No focal sensory or motor deficits noted. SKIN: Warm. Dry. No rashes or other lesions noted. ASSESSMENT/ PLAN: Diagnoses and all orders for this visit:    1. Type 2 diabetes mellitus without complication, without long-term current use of insulin (HCC)  -     AMB POC HEMOGLOBIN A1C    2. Essential hypertension  -     metoprolol tartrate (LOPRESSOR) 25 mg tablet; Take 1 Tab by mouth two (2) times a day. -     hydroCHLOROthiazide (HYDRODIURIL) 25 mg tablet; TAKE ONE TABLET BY MOUTH DAILY    3. Mixed hyperlipidemia  -     rosuvastatin (CRESTOR) 10 mg tablet; TAKE ONE TABLET BY MOUTH EVERY EVENING    Other orders  -     Blood-Glucose Meter misc; One Touch Ultra. Use as directed to check blood sugars twice daily        ICD-10-CM ICD-9-CM    1. Type 2 diabetes mellitus without complication, without long-term current use of insulin (HCC) E11.9 250.00 AMB POC HEMOGLOBIN A1C   2.  Essential hypertension I10 401.9 metoprolol tartrate (LOPRESSOR) 25 mg tablet      hydroCHLOROthiazide (HYDRODIURIL) 25 mg tablet   3. Mixed hyperlipidemia E78.2 272.2 rosuvastatin (CRESTOR) 10 mg tablet      1. DM type 2  I advised pt to continue current dose of metformin, avoid sugars and starches, and to increase exercise when possible. I ordered a POC HgA1c (6.8). 2. Hypertension  BP seems to be well controlled. I recommended continuing current dose of lisinopril, metoprolol tartrate (refilled), nisoldipine, atenolol, and HCTZ (refilled), eating a low sodium diet, and increasing exercise. 3. Hyperlipidemia   Lipid panel shows cholesterol seems to be well controlled. I recommended continuing current dose of rosuvastatin (refill given), eating a low fat diet, and increasing exercise. I encouraged pt to take Beano to prevent gassiness and eat a probiotic daily. I encouraged her to continue to take a fish oil supplement. I also advised pt to take a multivitamin. Pt will return in 3 months for f/u labs. Follow-up Disposition:  Return in about 6 months (around 10/20/2018) for follow up htn and diabetes, A1c lab in 3 mo. I have reviewed the patient's medications and risks/side effects/benefits were discussed. Diagnosis(-es) explained to patient and questions answered. Literature provided where appropriate.      Written by Sosa Christina, as dictated by Shahzad Reyna MD.

## 2018-04-20 NOTE — MR AVS SNAPSHOT
[address] [place] [phone] Patient: Mynor Goodrich MRN: [id number] AMR:[id number] Visit Information Date & Time Provider Department Dept. Phone Encounter #  
 4/20/2018  1:45 PM Patricio Reid, [address] [place] [address],[address] [phone] [id number] Follow-up Instructions Return in about 6 months (around 10/20/2018) for follow up htn and diabetes, A1c lab in 3 mo. Your Appointments 5/22/2018  8:30 AM  
ROUTINE CARE with Kings Drew MD  
[facility]) Appt Note: Well Woman $0CP SL 5.19.17  
 [address] [facility]. [address] [address] [address] [place] [address] [address] [place] [address] Upcoming Health Maintenance Date Due  
 FOOT EXAM Q1 3/22/1965 Pneumococcal 19-64 Medium Risk (1 of 1 - PPSV23) 3/22/1974 DTaP/Tdap/Td series (1 - Tdap) 3/22/1976 ZOSTER VACCINE AGE 60> 1/22/2015 Influenza Age 5 to Adult 8/1/2017 MICROALBUMIN Q1 9/13/2017 HEMOGLOBIN A1C Q6M 12/13/2017 LIPID PANEL Q1 6/13/2018 EYE EXAM RETINAL OR DILATED Q1 2/13/2019 BREAST CANCER SCRN MAMMOGRAM 9/28/2019 PAP AKA CERVICAL CYTOLOGY 5/19/2020 COLONOSCOPY 3/12/2024 Allergies as of 4/20/2018  Review Complete On: 4/20/2018 By: Patricio Reid MD  
  
 Severity Noted Reaction Type Reactions Percocet [Oxycodone-acetaminophen]  06/29/2011    Hives Current Immunizations  Never Reviewed No immunizations on file. Not reviewed this visit You Were Diagnosed With   
  
 Codes Comments Type 2 diabetes mellitus without complication, without long-term current use of insulin (HCC)    -  Primary ICD-10-CM: E11.9 ICD-9-CM: 250.00 Essential hypertension     ICD-10-CM: I10 
ICD-9-CM: 401.9 Mixed hyperlipidemia     ICD-10-CM: E78.2 ICD-9-CM: 272.2 Vitals BP Pulse Temp Resp Height(growth percentile) Weight(growth percentile) 107/68 (BP 1 Location: Left arm, BP Patient Position: Sitting) 86 97.8 °F (36.6 °C) (Oral) 16 5' 5\" (1.651 m) 189 lb (85.7 kg) LMP SpO2 BMI OB Status Smoking Status 03/25/1999 97% 31.45 kg/m2 Hysterectomy Former Smoker BMI and BSA Data Body Mass Index Body Surface Area  
 31.45 kg/m 2 1.98 m 2 Preferred Pharmacy Pharmacy Name Phone Ashley Paul [facility] [address], [address] [facility] [phone] Your Updated Medication List  
  
   
This list is accurate as of 4/20/18  2:26 PM.  Always use your most recent med list.  
  
  
  
  
 atenolol 25 mg tablet Commonly known as:  TENORMIN  
TAKE 1 TABLET BY MOUTH ONCE DAILY  
  
 diclofenac 1 % Gel Commonly known as:  VOLTAREN Apply  to affected area four (4) times daily. FISH OIL 1,000 mg Cap Generic drug:  omega-3 fatty acids-vitamin e Take 1 Cap by mouth. glucose blood VI test strips strip Commonly known as:  ONETOUCH ULTRA TEST Check blood sugars daily  Indications: twice daily * hydroCHLOROthiazide 25 mg tablet Commonly known as:  HYDRODIURIL  
TAKE ONE TABLET BY MOUTH DAILY * hydroCHLOROthiazide 25 mg tablet Commonly known as:  HYDRODIURIL  
TAKE ONE TABLET BY MOUTH DAILY  
  
 lisinopril 40 mg tablet Commonly known as:  PRINIVIL, ZESTRIL  
TAKE 1 TABLET BY MOUTH EVERY DAY  
  
 metFORMIN 500 mg tablet Commonly known as:  GLUCOPHAGE  
TAKE ONE TABLET BY MOUTH DAILY  
  
 metoprolol tartrate 25 mg tablet Commonly known as:  LOPRESSOR Take 1 Tab by mouth two (2) times a day. Nisoldipine 34 mg Tb24 SR tablet Commonly known as:  SULAR  
TAKE 1 TABLET BY MOUTH ONCE DAILY  
  
 rosuvastatin 10 mg tablet Commonly known as:  CRESTOR  
TAKE ONE TABLET BY MOUTH EVERY EVENING  
  
 VITAMIN D2 50,000 unit capsule Generic drug:  ergocalciferol Take 1,000 Units by mouth. * Notice: This list has 2 medication(s) that are the same as other medications prescribed for you. Read the directions carefully, and ask your doctor or other care provider to review them with you. Prescriptions Sent to Pharmacy Refills  
 metoprolol tartrate (LOPRESSOR) 25 mg tablet 2 Sig: Take 1 Tab by mouth two (2) times a day. Class: Normal  
 Pharmacy: [facility] [address] [place], [place] [address] RD AT [address] [place] [address] Ph #: [phone] Route: Oral  
 hydroCHLOROthiazide (HYDRODIURIL) 25 mg tablet 2 Sig: TAKE ONE TABLET BY MOUTH DAILY Class: Normal  
 [facility] [address] [facility] [address] [facility] [address] AT [address] [place] [address] Ph #: [phone]  
 rosuvastatin (CRESTOR) 10 mg tablet 11 Sig: TAKE ONE TABLET BY MOUTH EVERY EVENING Class: Normal  
 Pharmacy: [facility] [address] [place] [address] RD AT [address] [place] [address] Ph #: [phone] We Performed the Following AMB POC HEMOGLOBIN A1C [55922 CPT(R)] Follow-up Instructions Return in about 6 months (around 10/20/2018) for follow up htn and diabetes, A1c lab in 3 mo. Introducing [facility]! Dear Cherelle Schmidt: Thank you for requesting a [facility] account. Our records indicate that you already have an active [facility] account. You can access your account anytime at [email or web address] Did you know that you can access your hospital and ER discharge instructions at any time in [facility]a [facility]? You can also review all of your test results from your hospital stay or ER visit. Additional Information If you have questions, please visit the Frequently Asked Questions section of the [employer] website at [email or web address] Remember, [facility] is NOT to be used for urgent needs. For medical emergencies, dial 911. Now available from your iPhone and Android! Please provide this summary of care documentation to your next provider. Your primary care clinician is listed as Cammy Staggers. If you have any questions after today's visit, please call [phone].

## 2018-05-13 RX ORDER — NISOLDIPINE 34 MG/1
TABLET, FILM COATED, EXTENDED RELEASE ORAL
Qty: 30 TAB | Refills: 0 | Status: SHIPPED | OUTPATIENT
Start: 2018-05-13 | End: 2018-06-17 | Stop reason: SDUPTHER

## 2018-05-22 ENCOUNTER — OFFICE VISIT (OUTPATIENT)
Dept: SURGERY | Age: 63
End: 2018-05-22

## 2018-05-22 VITALS
TEMPERATURE: 98.1 F | RESPIRATION RATE: 18 BRPM | BODY MASS INDEX: 33.66 KG/M2 | OXYGEN SATURATION: 98 % | HEART RATE: 93 BPM | HEIGHT: 65 IN | DIASTOLIC BLOOD PRESSURE: 80 MMHG | SYSTOLIC BLOOD PRESSURE: 134 MMHG | WEIGHT: 202 LBS

## 2018-05-22 DIAGNOSIS — N95.9 MENOPAUSAL AND POSTMENOPAUSAL DISORDER: ICD-10-CM

## 2018-05-22 DIAGNOSIS — Z01.419 WOMEN'S ANNUAL ROUTINE GYNECOLOGICAL EXAMINATION: Primary | ICD-10-CM

## 2018-05-22 DIAGNOSIS — Z90.710 HISTORY OF HYSTERECTOMY INCLUDING CERVIX: ICD-10-CM

## 2018-05-22 NOTE — PROGRESS NOTES
Chief Complaint   Patient presents with    Well Woman     1. Have you been to the ER, urgent care clinic since your last visit? Hospitalized since your last visit? No    2. Have you seen or consulted any other health care providers outside of the [address] [place] [address] since your last visit? Include any pap smears or colon screening.  No

## 2018-05-22 NOTE — PROGRESS NOTES
SUBJECTIVE: Stephanie Gonzalez is a 61 y.o. female who presents with desire for annual well woman exam. Patient's last menstrual period was 1999. Allergies   Allergen Reactions    Percocet [Oxycodone-Acetaminophen] Hives         Past Medical History:   Diagnosis Date    Diabetes ([address]     Hypercholesterolemia     Hypertension     Other ill-defined conditions(799.89)     high cholesterol     Past Surgical History:   Procedure Laterality Date    HX HYSTERECTOMY  3/25/99    SAR[place]NAN    HX ORTHOPAEDIC      left foot surgery    HX ROTATOR CUFF REPAIR  10/2017    HX TUBAL LIGATION       OB History     Grav Para Term  Abortions TAB SAB Ect Mult Living    2 1 1 0 1 0 1 0 0 1        Family History   Problem Relation Age of Onset    Hypertension Mother     Cancer Mother 64     Stomach cancer    Hypertension Father     Cancer Father      Stomach cancer    Hypertension Sister     Diabetes Sister     Hypertension Brother     Hypertension Sister     Hypertension Sister     Hypertension Brother     Breast Cancer Paternal Aunt      over 48     Social History     Social History    Marital status:      Spouse name: N/A    Number of children: N/A    Years of education: N/A     Occupational History    Not on file. Social History Main Topics    Smoking status: Former Smoker     Years: 15.00     Quit date: 1990    Smokeless tobacco: Never Used    Alcohol use Yes    Drug use: Not on file    Sexual activity: Yes     Partners: Male     Birth control/ protection: Surgical, Inserts     Other Topics Concern    Not on file     Social History Narrative     Current Outpatient Prescriptions   Medication Sig Dispense Refill    Nisoldipine (SULAR) 34 mg Tb24 SR tablet TAKE 1 TABLET BY MOUTH EVERY DAY 30 Tab 0    rosuvastatin (CRESTOR) 10 mg tablet TAKE 1 TABLET BY MOUTH ONCE EVERY EVENING 30 Tab 11    metoprolol tartrate (LOPRESSOR) 25 mg tablet Take 1 Tab by mouth two (2) times a day.  180 Tab 2    Blood-Glucose Meter misc One Touch Ultra. Use as directed to check blood sugars twice daily 1 Each 0    glucose blood VI test strips (ONETOUCH ULTRA TEST) strip Check blood sugars daily  Indications: twice daily 100 Strip 11    lisinopril (PRINIVIL, ZESTRIL) 40 mg tablet TAKE 1 TABLET BY MOUTH EVERY DAY 30 Tab 6    metFORMIN (GLUCOPHAGE) 500 mg tablet TAKE ONE TABLET BY MOUTH DAILY 90 Tab 3    hydrochlorothiazide (HYDRODIURIL) 25 mg tablet TAKE ONE TABLET BY MOUTH DAILY 30 Tab 6    ergocalciferol (VITAMIN D2) 50,000 unit capsule Take 1,000 Units by mouth.  omega-3 fatty acids-vitamin e (FISH OIL) 1,000 mg cap Take 1 Cap by mouth. Review of Systems:   Constitutional: No weight change, chills or fever, anorexia, weakness or sleep disturbance . Cardiovascular: No chest pain, shortness of breath, or palpitations . Respiratory: No cough, shortness of breath, hemoptysis, or orthopnea . Neurologic: No syncope, headaches or seizures . Hematologic: No easy bruising or unusual bleeding . Psychiatric: No insomnia, confusion, depression, or anxiety . GI:No nausea and vomiting, diarrhea or constipation  . : See HPI . Musculoskeletal: No joint pain or muscle pain . Endocrine: No polydipsia, polyuria, cold intolerance, excessive fatigue, or sleep disturbance . Integumentary: No breast pain, lumps, nipple discharge, or axillary lumps .     Objective:     Visit Vitals    /80    Pulse 93    Temp 98.1 °F (36.7 °C) (Oral)    Resp 18    Ht 5' 5\" (1.651 m)    Wt 202 lb (91.6 kg)    LMP 03/25/1999    SpO2 98%    BMI 33.61 kg/m2       General:  alert, cooperative, no distress, appears stated age   Skin:  no rash or abnormalities   Eyes: negative   Mouth: MMM no lesions   Lymph Nodes:  Cervical, supraclavicular, and axillary nodes normal.   Breast Exam: normal appearance, no masses or tenderness    Lungs:  clear to auscultation bilaterally   Heart:  regular rate and rhythm   Abdomen: soft, non-tender. Bowel sounds normal. No masses,  no organomegaly   Back:  Costovertebral angle tenderness absent   Genitourinary: Pelvic exam: VULVA: normal appearing vulva with no masses, tenderness or lesions, VAGINA: normal appearing vagina with normal color and discharge, no lesions, PELVIC FLOOR EXAM: no cystocele, rectocele or prolapse noted, CERVIX: surgically absent, UTERUS: absent, ADNEXA: normal adnexa in size, nontender and no masses    Extremities:  extremities normal, atraumatic, no cyanosis or edema   Neurologic:  sensation grossly intact. Psychiatric:  non focal     ASSESSMENT:      ICD-10-CM ICD-9-CM    1. Women's annual routine gynecological examination Z01.419 V72.31 DEVIN MAMMO BI SCREENING INCL CAD   2. Menopausal and postmenopausal disorder N95.9 627.9    3. History of hysterectomy including cervix Z90.710 V88.01         Follow-up Disposition:  Return in about 1 year (around 5/22/2019), or if symptoms worsen or fail to improve.

## 2018-05-22 NOTE — MR AVS SNAPSHOT
[address] [address] [phone] Patient: Eliane Harrison MRN: [id number] YAM:[birth year] Visit Information Date & Time Provider Department Dept. Phone Encounter #  
 5/22/2018  8:30 AM Nubia Hollingsworth, [address] [place] [address] Surgical [address] [id number] Follow-up Instructions Return in about 1 year (around 5/22/2019), or if symptoms worsen or fail to improve. Your Appointments 10/24/2018  8:00 AM  
ROUTINE CARE with Aung Hood MD  
[facility]) Appt Note: routine 6 mo f/u  
 [place] [address] [address]  
[address] [facility] [address] Upcoming Health Maintenance Date Due  
 FOOT EXAM Q1 3/22/1965 Pneumococcal 19-64 Medium Risk (1 of 1 - PPSV23) 3/22/1974 DTaP/Tdap/Td series (1 - Tdap) 3/22/1976 ZOSTER VACCINE AGE 60> 1/22/2015 MICROALBUMIN Q1 9/13/2017 LIPID PANEL Q1 6/13/2018 Influenza Age 5 to Adult 8/1/2018 HEMOGLOBIN A1C Q6M 10/20/2018 EYE EXAM RETINAL OR DILATED Q1 2/13/2019 BREAST CANCER SCRN MAMMOGRAM 9/28/2019 PAP AKA CERVICAL CYTOLOGY 5/19/2020 COLONOSCOPY 3/12/2024 Allergies as of 5/22/2018  Review Complete On: 5/22/2018 By: Nubia Hollingsworth MD  
  
 Severity Noted Reaction Type Reactions Percocet [Oxycodone-acetaminophen]  06/29/2011    Hives Current Immunizations  Never Reviewed No immunizations on file. Not reviewed this visit You Were Diagnosed With   
  
 Codes Comments Women's annual routine gynecological examination    -  Primary ICD-10-CM: Q79.284 ICD-9-CM: V72.31 Menopausal and postmenopausal disorder     ICD-10-CM: N95.9 ICD-9-CM: 627.9 History of hysterectomy including cervix     ICD-10-CM: Z90.710 ICD-9-CM: V88.01 Vitals BP Pulse Temp Resp Height(growth percentile) Weight(growth percentile) 134/80 93 98.1 °F (36.7 °C) (Oral) 18 5' 5\" (1.651 m) 202 lb (91.6 kg) LMP SpO2 BMI OB Status Smoking Status 03/25/1999 98% 33.61 kg/m2 Hysterectomy Former Smoker BMI and BSA Data Body Mass Index Body Surface Area  
 33.61 kg/m 2 2.05 m 2 Preferred Pharmacy Pharmacy Name Phone [facility] [address] [facility] [phone] Your Updated Medication List  
  
   
This list is accurate as of 5/22/18  9:01 AM.  Always use your most recent med list.  
  
  
  
  
 Blood-Glucose Meter Misc One Touch Ultra. Use as directed to check blood sugars twice daily FISH OIL 1,000 mg Cap Generic drug:  omega-3 fatty acids-vitamin e Take 1 Cap by mouth. glucose blood VI test strips strip Commonly known as:  ONETOUCH ULTRA TEST Check blood sugars daily  Indications: twice daily  
  
 hydroCHLOROthiazide 25 mg tablet Commonly known as:  HYDRODIURIL  
TAKE ONE TABLET BY MOUTH DAILY  
  
 lisinopril 40 mg tablet Commonly known as:  PRINIVIL, ZESTRIL  
TAKE 1 TABLET BY MOUTH EVERY DAY  
  
 metFORMIN 500 mg tablet Commonly known as:  GLUCOPHAGE  
TAKE ONE TABLET BY MOUTH DAILY  
  
 metoprolol tartrate 25 mg tablet Commonly known as:  LOPRESSOR Take 1 Tab by mouth two (2) times a day. Nisoldipine 34 mg Tb24 SR tablet Commonly known as:  SULAR  
TAKE 1 TABLET BY MOUTH EVERY DAY  
  
 rosuvastatin 10 mg tablet Commonly known as:  CRESTOR  
TAKE 1 TABLET BY MOUTH ONCE EVERY EVENING  
  
 VITAMIN D2 50,000 unit capsule Generic drug:  ergocalciferol Take 1,000 Units by mouth. Follow-up Instructions Return in about 1 year (around 5/22/2019), or if symptoms worsen or fail to improve. To-Do List   
 09/22/2018 Imaging:  DEVIN MAMMO BI SCREENING INCL CAD Introducing [facility]! Dear Kenn Little: Thank you for requesting a [employer] account. Our records indicate that you already have an active [employer] account. You can access your account anytime at [email or web address] Did you know that you can access your hospital and ER discharge instructions at any time in [employer]? You can also review all of your test results from your hospital stay or ER visit. Additional Information If you have questions, please visit the Frequently Asked Questions section of the [employer] website at [email or web address] Remember, [employer] is NOT to be used for urgent needs. For medical emergencies, dial 911. Now available from your iPhone and Android! Please provide this summary of care documentation to your next provider. Your primary care clinician is listed as Jorge Dee. If you have any questions after today's visit, please call [phone].

## 2018-06-18 RX ORDER — NISOLDIPINE 34 MG/1
TABLET, FILM COATED, EXTENDED RELEASE ORAL
Qty: 30 TAB | Refills: 6 | Status: SHIPPED | OUTPATIENT
Start: 2018-06-18 | End: 2019-01-15 | Stop reason: SDUPTHER

## 2018-10-09 ENCOUNTER — HOSPITAL ENCOUNTER (OUTPATIENT)
Dept: MAMMOGRAPHY | Age: 63
Discharge: HOME | End: 2018-10-09
Attending: OBSTETRICS & GYNECOLOGY
Payer: COMMERCIAL

## 2018-10-09 DIAGNOSIS — Z12.39 SCREENING BREAST EXAMINATION: ICD-10-CM

## 2018-10-09 PROCEDURE — 77067 SCR MAMMO BI INCL CAD: CPT

## 2018-11-12 ENCOUNTER — OFFICE VISIT (OUTPATIENT)
Dept: INTERNAL MEDICINE CLINIC | Age: 63
End: 2018-11-12

## 2018-11-12 VITALS
TEMPERATURE: 98.1 F | BODY MASS INDEX: 32.49 KG/M2 | WEIGHT: 195 LBS | HEART RATE: 68 BPM | OXYGEN SATURATION: 99 % | DIASTOLIC BLOOD PRESSURE: 74 MMHG | RESPIRATION RATE: 18 BRPM | SYSTOLIC BLOOD PRESSURE: 132 MMHG | HEIGHT: 65 IN

## 2018-11-12 DIAGNOSIS — I10 ESSENTIAL HYPERTENSION: ICD-10-CM

## 2018-11-12 DIAGNOSIS — E78.2 MIXED HYPERLIPIDEMIA: ICD-10-CM

## 2018-11-12 DIAGNOSIS — E11.9 TYPE 2 DIABETES MELLITUS WITHOUT COMPLICATION, WITHOUT LONG-TERM CURRENT USE OF INSULIN (HCC): Primary | ICD-10-CM

## 2018-11-12 NOTE — Clinical Note
Please give this patient the contact number for Dr. Gayathri Hobson. Let her know I found some notes , but did not see any mention of a cardiac murmur.

## 2018-11-12 NOTE — PROGRESS NOTES
Reviewed record in preparation for visit and have obtained necessary documentation. Identified pt with two pt identifiers(name and ). Chief Complaint   Patient presents with    Diabetes    Hypertension       Health Maintenance Due   Topic Date Due    Diabetic Foot Care  1965    Pneumococcal Vaccine (1 of 1 - PPSV23) 1974    DTaP/Tdap/Td  (1 - Tdap) 1976    Shingles Vaccine (1 of 2) 2005    Albumin Urine Test  2017    Cholesterol Test   2018    Flu Vaccine  2018    Hemoglobin A1C    10/20/2018       Ms. Rasheeda Davenport has a reminder for a \"due or due soon\" health maintenance. I have asked that she discuss this further with her primary care provider for follow-up on this health maintenance. Coordination of Care Questionnaire:  :     1) Have you been to an emergency room, urgent care clinic since your last visit? no     Hospitalized since your last visit? no             2) Have you seen or consulted any other health care providers outside of [address] [place] [address] since your last visit? no  (Include any pap smears or colon screenings in this section.)    3) In the event something were to happen to you and you were unable to speak on your behalf, do you have an Advance Directive/ Living Will in place stating your wishes? NO    Do you have an Advance Directive on file? no    4) Are you interested in receiving information on Advance Directives? NO    Patient is accompanied by self I have received verbal consent from Lorena Murphy to discuss any/all medical information while they are present in the room.

## 2018-11-12 NOTE — PROGRESS NOTES
SUBJECTIVE:   Ms. Ashvin Horvath is a 61 y.o. female who is here for follow up of routine medical issues. Pt is fasting in the office today and has not taken her medications. Pt's weight today in the office is 195lb, down 7lbs x 5/22/18. Pt is actively trying to reduce her weight by monitoring her diet. Pt would like to start walking for exercise. HTN: Pt's BP in the office today was 132/74. Pt is compliant in taking lisinopril, lopressor, HCTZ, and nisoldipine. Pt inquired about not having to take any medications in the evening, specifically her lopressor. Pt reports edema in her ankles; she denies increased sodium intake. Patient denies chest pain, VALDEZ/SOB, headache, visual changes, dizziness, palpitations or syncope. DM: Pt is compliant in taking metformin. Patient denies numbness/tingling in extremities, fatigue, dizziness, polydipsia, polyuria, polyphagia, pancreatitis, increased sugar consumption, sore/bleeding gums, blurred vision, or cuts that will not heal. Last HgA1c was 6.8% in 4/2018. Pt reports hx of a heart murmur \"at least 15yrs ago\" and cannot recall her last evaluation. Pt inquired about any necessary f/u care. Pt does not take a multivitamin. She takes fish oil and vit D 1000mg. Pt denies problems urinating or changes in her bowel habits. PREVENTIVE:  Pap: 5/19/17  Mammogram: 10/9/18  Pneumonia vaccine: pt will consider  Flu: declined    At this time, she is otherwise doing well and has brought no other complaints to my attention today. For a list of the medical issues addressed today, see the assessment and plan below. PMH:   Past Medical History:   Diagnosis Date    Diabetes ([facility] [address]     Hypercholesterolemia     Hypertension     Other ill-defined conditions(799.89)     high cholesterol     PSH:  has a past surgical history that includes hx tubal ligation; hx hysterectomy (3/25/99); hx orthopaedic; and hx rotator cuff repair (10/2017).     All: is allergic to percocet [oxycodone-acetaminophen]. MEDS:   Current Outpatient Medications   Medication Sig    Nisoldipine (SULAR) 34 mg Tb24 SR tablet TAKE 1 TABLET BY MOUTH EVERY DAY    hydroCHLOROthiazide (HYDRODIURIL) 25 mg tablet TAKE 1 TABLET BY MOUTH DAILY    rosuvastatin (CRESTOR) 10 mg tablet TAKE 1 TABLET BY MOUTH ONCE EVERY EVENING    metoprolol tartrate (LOPRESSOR) 25 mg tablet Take 1 Tab by mouth two (2) times a day.  Blood-Glucose Meter misc One Touch Ultra. Use as directed to check blood sugars twice daily    glucose blood VI test strips (ONETOUCH ULTRA TEST) strip Check blood sugars daily  Indications: twice daily    metFORMIN (GLUCOPHAGE) 500 mg tablet TAKE ONE TABLET BY MOUTH DAILY    hydrochlorothiazide (HYDRODIURIL) 25 mg tablet TAKE ONE TABLET BY MOUTH DAILY    ergocalciferol (VITAMIN D2) 50,000 unit capsule Take 1,000 Units by mouth.  omega-3 fatty acids-vitamin e (FISH OIL) 1,000 mg cap Take 1 Cap by mouth.  lisinopril (PRINIVIL, ZESTRIL) 40 mg tablet TAKE 1 TABLET BY MOUTH EVERY DAY     No current facility-administered medications for this visit. FH: family history includes Breast Cancer in her paternal aunt; Cancer in her father; Cancer (age of onset: 64) in her mother; Diabetes in her sister; Hypertension in her brother, brother, father, mother, sister, sister, and sister. SH:  reports that she quit smoking about 28 years ago. She quit after 15.00 years of use. she has never used smokeless tobacco. She reports that she drinks alcohol.      Review of Systems - History obtained from the patient  General ROS: no fever, chills, fatigue, body aches  Psychological ROS: no change in anxiety, depression, SI/HI  Ophthalmic ROS: no blurred vision, myopia, double vision  ENT ROS: no dysphagia, otalgia, otorrhea, rhinorrhea, post nasal drip  Respiratory ROS: no cough, shortness of breath, or wheezing  Cardiovascular ROS: no chest pain or dyspnea on exertion  Gastrointestinal ROS: no abdominal pain, change in bowel habits, or black or bloody stools  Genito-Urinary ROS: no frequency, urgency, incontinence, dysuria, hematouria  Musculoskeletal ROS: no arthralgia, myalgia  Neurological ROS: no headaches, dizziness, lightheadedness, tremors, seizures  Dermatological ROS: edema of ankles no rash or lesions    OBJECTIVE:   Vitals:   Visit Vitals  /74 (BP 1 Location: Left arm, BP Patient Position: Sitting)   Pulse 68   Temp 98.1 °F (36.7 °C) (Oral)   Resp 18   Ht 5' 5\" (1.651 m)   Wt 195 lb (88.5 kg)   LMP 03/25/1999   SpO2 99%   BMI 32.45 kg/m²      Gen: Pleasant 61 y.o.  female in NAD. HEENT: PERRLA. EOMI. OP moist and pink. Neck: Supple. No LAD. HEART: RRR, No M/G/R.      LUNGS: CTAB No W/R. ABDOMEN: S, NT, ND, BS+. EXTREMITIES: Warm. No C/C/E.    MUSCULOSKELETAL: Normal ROM, muscle strength 5/5 all groups. NEURO: Alert and oriented x 3. Cranial nerves grossly intact. No focal sensory or motor deficits noted. SKIN: Warm. Dry. No rashes or other lesions noted. ASSESSMENT/ PLAN: Diagnoses and all orders for this visit:    1. Type 2 diabetes mellitus without complication, without long-term current use of insulin (HCC)  -     HEMOGLOBIN A1C WITH EAG  -     METABOLIC PANEL, COMPREHENSIVE    2. Essential hypertension  -     METABOLIC PANEL, COMPREHENSIVE    3. Mixed hyperlipidemia  -     LIPID PANEL AND CHOL/HDL RATIO  -     METABOLIC PANEL, COMPREHENSIVE        ICD-10-CM ICD-9-CM    1. Type 2 diabetes mellitus without complication, without long-term current use of insulin (HCC) E11.9 250.00 HEMOGLOBIN A1C WITH EAG      METABOLIC PANEL, COMPREHENSIVE   2. Essential hypertension E65 690.5 METABOLIC PANEL, COMPREHENSIVE   3. Mixed hyperlipidemia E78.2 272.2 LIPID PANEL AND CHOL/HDL RATIO      METABOLIC PANEL, COMPREHENSIVE        1. DM type 2  Pt's blood sugar seems to be controlled.  I advised pt to continue current dose of metformin, avoid sugars and starches, and to increase exercise when possible. Recheck hemoglobin A1c.     2. Hypertension  BP seems to be well controlled. I recommended continuing current dose of lopressor, lisinopril, HCTZ, and nisoldipine, eating a low sodium diet, and increasing exercise. Pt will f/u with her pharmacist for the cost of switching to lopressor ER and if preferred, I will alter the medication. Recheck CMP. 3. Mixed Hyperlipidemia   Lipid panel shows cholesterol seems to be well controlled. I recommended continuing current dose of crestor, eating a low fat diet, and increasing exercise. Recheck lipid panel. I encouraged pt to keep up her weight loss efforts and congratulated her on her success. I recommended she incorporate exercise with a goal of losing 5lbs x nov for better A1c and edema control. I recommended pt use an OTC multivitamin with vit D. I will assess pt's records for signs of a murmur and f/u regarding any additional procedures. There was no murmur noted on physical exam.     Follow-up Disposition:  Return in about 6 months (around 5/12/2019) for follow up. I have reviewed the patient's medications and risks/side effects/benefits were discussed. Diagnosis(-es) explained to patient and questions answered. Literature provided where appropriate.      Written by Manfred Reis, as dictated by Dinesh Mercado MD.

## 2018-11-13 LAB
ALBUMIN SERPL-MCNC: 4.4 G/DL (ref 3.6–4.8)
ALBUMIN/GLOB SERPL: 1.8 {RATIO} (ref 1.2–2.2)
ALP SERPL-CCNC: 74 IU/L (ref 39–117)
ALT SERPL-CCNC: 23 IU/L (ref 0–32)
AST SERPL-CCNC: 18 IU/L (ref 0–40)
BILIRUB SERPL-MCNC: 0.3 MG/DL (ref 0–1.2)
BUN SERPL-MCNC: 11 MG/DL (ref 8–27)
BUN/CREAT SERPL: 16 (ref 12–28)
CALCIUM SERPL-MCNC: 9.7 MG/DL (ref 8.7–10.3)
CHLORIDE SERPL-SCNC: 103 MMOL/L (ref 96–106)
CHOLEST SERPL-MCNC: 149 MG/DL (ref 100–199)
CHOLEST/HDLC SERPL: 3 RATIO (ref 0–4.4)
CO2 SERPL-SCNC: 27 MMOL/L (ref 20–29)
CREAT SERPL-MCNC: 0.69 MG/DL (ref 0.57–1)
EST. AVERAGE GLUCOSE BLD GHB EST-MCNC: 157 MG/DL
GLOBULIN SER CALC-MCNC: 2.4 G/DL (ref 1.5–4.5)
GLUCOSE SERPL-MCNC: 136 MG/DL (ref 65–99)
HBA1C MFR BLD: 7.1 % (ref 4.8–5.6)
HDLC SERPL-MCNC: 50 MG/DL
LDLC SERPL CALC-MCNC: 85 MG/DL (ref 0–99)
POTASSIUM SERPL-SCNC: 4.6 MMOL/L (ref 3.5–5.2)
PROT SERPL-MCNC: 6.8 G/DL (ref 6–8.5)
SODIUM SERPL-SCNC: 147 MMOL/L (ref 134–144)
TRIGL SERPL-MCNC: 72 MG/DL (ref 0–149)
VLDLC SERPL CALC-MCNC: 14 MG/DL (ref 5–40)

## 2018-11-23 ENCOUNTER — TELEPHONE (OUTPATIENT)
Dept: INTERNAL MEDICINE CLINIC | Age: 63
End: 2018-11-23

## 2018-11-23 RX ORDER — METFORMIN HYDROCHLORIDE 500 MG/1
TABLET ORAL
Qty: 90 TAB | Refills: 3 | Status: SHIPPED | OUTPATIENT
Start: 2018-11-23 | End: 2019-11-06 | Stop reason: SDUPTHER

## 2018-12-08 NOTE — PROGRESS NOTES
Lipids-normal  A1c-Your current hgbA1c is higher than your last level of 6.7. Work on following a diabetic diet and exercise. Recheck this test: hgbA1c  in  3 months. Continue with current  Medications.   CMP-Normal electrolyte levels except for an elevation in the glucose and sodium level, normal renal and liver function

## 2019-04-17 ENCOUNTER — OFFICE VISIT (OUTPATIENT)
Dept: CARDIOLOGY CLINIC | Age: 64
End: 2019-04-17

## 2019-04-17 VITALS
OXYGEN SATURATION: 98 % | SYSTOLIC BLOOD PRESSURE: 109 MMHG | DIASTOLIC BLOOD PRESSURE: 70 MMHG | HEART RATE: 80 BPM | BODY MASS INDEX: 30.99 KG/M2 | WEIGHT: 186 LBS | RESPIRATION RATE: 18 BRPM | HEIGHT: 65 IN

## 2019-04-17 DIAGNOSIS — R01.1 MURMUR, CARDIAC: ICD-10-CM

## 2019-04-17 DIAGNOSIS — E11.9 TYPE 2 DIABETES MELLITUS WITHOUT COMPLICATION, WITHOUT LONG-TERM CURRENT USE OF INSULIN (HCC): ICD-10-CM

## 2019-04-17 DIAGNOSIS — I10 HYPERTENSION, UNSPECIFIED TYPE: Primary | ICD-10-CM

## 2019-04-17 DIAGNOSIS — E78.2 MIXED HYPERLIPIDEMIA: ICD-10-CM

## 2019-04-17 NOTE — PATIENT INSTRUCTIONS
-- We will get an echocardiogram to evaluate the heart murmur and any need for antibiotics prior to dental work   -- Please make a 1 month follow up to discuss test results     Transthoracic Echocardiogram: About This Test  What is it? An echocardiogram (also called an echo) uses sound waves to make an image of your heart. A device called a transducer sends sound waves that echo off your heart and back to the transducer. These echoes are turned into moving pictures of your heart that can be seen on a video screen. In a transthoracic echocardiogram (TTE), the transducer is moved across your chest or belly. A TTE is the most common type of echocardiogram.  Why is this test done? This test is done to check your heart health. It's used for many reasons. Your doctor may do an echocardiogram to:  · Check a heart murmur. · Look for the cause of shortness of breath or unexplained chest pain or pressure. · Check how well your heart is pumping blood. · Check to see how well your heart valves are working. · Look for blood clots inside your heart. What happens during the test?  · You will remove your clothes above your waist. You may be given a cloth or paper covering to use during the test.  · You will lie on your back or on your left side on a bed or table. · You may receive medicine through a vein (intravenously, or IV). The IV can be used to give you a contrast material, which helps your doctor get good views of your heart. · Small pads or patches (electrodes) will be taped to your arms and legs to record your heart rate during the test.  · A small amount of gel will be rubbed on the side of your chest to help  the sound waves. · The transducer will be pressed firmly against your chest and moved slowly back and forth. It is usually moved to different areas on your chest or belly to get specific views of your heart.   · You will be asked to do several things, such as hold very still, breathe in and out very slowly, hold your breath, or lie on your left side. This test usually takes 30 to 60 minutes. What else should you know about the test?  · You will not have any pain from an echocardiogram. You may have a brief, sharp pain if an intravenous (IV) needle is placed in a vein in your arm. · No electricity passes through your body during the test. There is no danger of getting an electrical shock. · You do not receive any radiation. What happens after the test?  · You will probably be able to go home right away. · You can go back to your usual activities right away. Follow-up care is a key part of your treatment and safety. Be sure to make and go to all appointments, and call your doctor if you are having problems. It's also a good idea to keep a list of the medicines you take. Ask your doctor when you can expect to have your test results. Where can you learn more? Go to http://marianna-beti.info/. Enter E130 in the search box to learn more about \"Transthoracic Echocardiogram: About This Test.\"  Current as of: July 22, 2018  Content Version: 11.9  © 8898-9098 [employer]. Care instructions adapted under license by [employer] (which disclaims liability or warranty for this information). If you have questions about a medical condition or this instruction, always ask your healthcare professional. [address] any warranty or liability for your use of this information.

## 2019-04-17 NOTE — PROGRESS NOTES
[facility]   [address] [facility], [address] [place] [address] Road                                          NEW PATIENT HPI/FOLLOW-UP      NAME:  Mikey Sofia   :   1955   MRN:   [id number]   PCP:  Hans Birmingham MD           Subjective: The patient is a 59y.o. year old female with h/o T2DM, HTN, HLD and remote heart murmur who presents as a new patient for evaluation of heart murmur. Since the last visit, patient reports no new symptoms. Denies change in exercise tolerance, chest pain, edema, medication intolerance, palpitations, shortness of breath, PND/orthopnea wheezing, sputum, syncope, dizziness or light headedness. Doing satisfactorily. Review of Systems  General: Pt denies excessive weight gain or loss. Pt is able to conduct ADL's. Respiratory: Denies shortness of breath, VALDEZ, wheezing or stridor.   Cardiovascular: Denies precordial pain, palpitations, edema or PND  Gastrointestinal: Denies poor appetite, indigestion, abdominal pain or blood in stool  Peripheral vascular: Denies claudication, leg cramps  Neuropsychiatric: Denies paresthesias,tingling,numbness,anxiety,depression,fatigue  Musculoskeletal: Denies pain,tenderness, soreness,swelling      Past Medical History:   Diagnosis Date    Diabetes ([address]     Hypercholesterolemia     Hypertension     Other ill-defined conditions(799.89)     high cholesterol     Patient Active Problem List    Diagnosis Date Noted    Menopausal and postmenopausal disorder 2016    HLD (hyperlipidemia) 2015    History of hysterectomy including cervix 2012    Diabetes mellitus ([address] 2012    HTN (hypertension) 2012      Past Surgical History:   Procedure Laterality Date    HX HYSTERECTOMY  3/25/99    SAR[place]NAN    HX ORTHOPAEDIC      left foot surgery    HX ROTATOR CUFF REPAIR  10/2017    HX TUBAL LIGATION       Allergies   Allergen Reactions    Percocet [Oxycodone-Acetaminophen] Hives      Family History Problem Relation Age of Onset    Hypertension Mother     Cancer Mother 64        Stomach cancer    Hypertension Father     Cancer Father         Stomach cancer    Hypertension Sister     Diabetes Sister     Hypertension Brother     Hypertension Sister     Hypertension Sister     Hypertension Brother     Breast Cancer Paternal Aunt         over 48      Social History     Socioeconomic History    Marital status:      Spouse name: Not on file    Number of children: Not on file    Years of education: Not on file    Highest education level: Not on file   Occupational History    Not on file   Social Needs    Financial resource strain: Not on file    Food insecurity:     Worry: Not on file     Inability: Not on file    Transportation needs:     Medical: Not on file     Non-medical: Not on file   Tobacco Use    Smoking status: Former Smoker     Years: 15.00     Last attempt to quit: 1990     Years since quittin.0    Smokeless tobacco: Never Used   Substance and Sexual Activity    Alcohol use:  Yes    Drug use: Not on file    Sexual activity: Yes     Partners: Male     Birth control/protection: Surgical, Inserts   Lifestyle    Physical activity:     Days per week: Not on file     Minutes per session: Not on file    Stress: Not on file   Relationships    Social connections:     Talks on phone: Not on file     Gets together: Not on file     Attends Hindu service: Not on file     Active member of club or organization: Not on file     Attends meetings of clubs or organizations: Not on file     Relationship status: Not on file    Intimate partner violence:     Fear of current or ex partner: Not on file     Emotionally abused: Not on file     Physically abused: Not on file     Forced sexual activity: Not on file   Other Topics Concern    Not on file   Social History Narrative    Not on file      Current Outpatient Medications   Medication Sig    fish oil-omega-3 fatty acids 340-1,000 mg capsule Take 1 Cap by mouth daily.  metoprolol tartrate (LOPRESSOR) 25 mg tablet TAKE 1 TABLET BY MOUTH TWICE DAILY    Nisoldipine (SULAR) 34 mg Tb24 SR tablet TAKE 1 TABLET BY MOUTH EVERY DAY    metFORMIN (GLUCOPHAGE) 500 mg tablet TAKE ONE TABLET BY MOUTH DAILY    lisinopril (PRINIVIL, ZESTRIL) 40 mg tablet TAKE 1 TABLET BY MOUTH EVERY DAY    rosuvastatin (CRESTOR) 10 mg tablet TAKE 1 TABLET BY MOUTH ONCE EVERY EVENING    Blood-Glucose Meter misc One Touch Ultra. Use as directed to check blood sugars twice daily    glucose blood VI test strips (ONETOUCH ULTRA TEST) strip Check blood sugars daily  Indications: twice daily    hydrochlorothiazide (HYDRODIURIL) 25 mg tablet TAKE ONE TABLET BY MOUTH DAILY    omega-3 fatty acids-vitamin e (FISH OIL) 1,000 mg cap Take 1 Cap by mouth.  ergocalciferol (VITAMIN D2) 50,000 unit capsule Take 1,000 Units by mouth. No current facility-administered medications for this visit. I have reviewed the nurses notes, vitals, problem list, allergy list, medical history, family medical, social history and medications. Objective:     Physical Exam:     Vitals:    04/17/19 0953 04/17/19 1002   BP: 115/61 109/70   Pulse: 80    Resp: 18    SpO2: 98%    Weight: 186 lb (84.4 kg)    Height: 5' 5\" (1.651 m)     Body mass index is 30.95 kg/m². General: WDWN AAF, in no acute distress. Pleasant affect. HEENT: No carotid bruits, no JVD, trach is midline. Heart:  Normal S1/S2 negative S3 or S4. Regular, no murmur, gallop or rub.   Respiratory: Clear bilaterally, no wheezing or rales  Abdomen:   Soft, non-tender, bowel sounds are active.   Extremities:  No edema, normal cap refill, no cyanosis. Neuro: A&Ox3, speech clear, gait stable. Skin: Skin color is normal. No rashes or lesions. No diaphoresis.   Vascular: 2+ pulses symmetric in all extremities        Data Review:       Cardiographics:    EKG interpretation: NSR,LVH,IVCD        Cardiology Labs:    Results for orders placed or performed during the hospital encounter of 10/05/17   EKG, 12 LEAD, INITIAL   Result Value Ref Range    Ventricular Rate 61 BPM    Atrial Rate 61 BPM    P-R Interval 132 ms    QRS Duration 84 ms    Q-T Interval 404 ms    QTC Calculation (Bezet) 406 ms    Calculated P Axis 23 degrees    Calculated R Axis 11 degrees    Calculated T Axis 21 degrees    Diagnosis       Sinus rhythm with occasional premature atrial complexes    Minimal voltage criteria for LVH, may be normal variant  No previous ECGs available  Confirmed by David Ken ([id number]) on 10/5/2017 10:17:07 PM         Lab Results   Component Value Date/Time    Cholesterol, total 149 11/12/2018 08:50 AM    HDL Cholesterol 50 11/12/2018 08:50 AM    LDL, calculated 85 11/12/2018 08:50 AM    Triglyceride 72 11/12/2018 08:50 AM       Lab Results   Component Value Date/Time    Sodium 147 (H) 11/12/2018 08:50 AM    Potassium 4.6 11/12/2018 08:50 AM    Chloride 103 11/12/2018 08:50 AM    CO2 27 11/12/2018 08:50 AM    Glucose 136 (H) 11/12/2018 08:50 AM    BUN 11 11/12/2018 08:50 AM    Creatinine 0.69 11/12/2018 08:50 AM    BUN/Creatinine ratio 16 11/12/2018 08:50 AM    GFR est  11/12/2018 08:50 AM    GFR est non-AA 93 11/12/2018 08:50 AM    Calcium 9.7 11/12/2018 08:50 AM    Bilirubin, total 0.3 11/12/2018 08:50 AM    AST (SGOT) 18 11/12/2018 08:50 AM    Alk. phosphatase 74 11/12/2018 08:50 AM    Protein, total 6.8 11/12/2018 08:50 AM    Albumin 4.4 11/12/2018 08:50 AM    A-G Ratio 1.8 11/12/2018 08:50 AM    ALT (SGPT) 23 11/12/2018 08:50 AM          Assessment:       ICD-10-CM ICD-9-CM    1. Hypertension, unspecified type I10 401.9 AMB POC EKG ROUTINE W/ 12 LEADS, INTER & REP      ECHO ADULT COMPLETE   2. Mixed hyperlipidemia E78.2 272.2    3. Type 2 diabetes mellitus without complication, without long-term current use of insulin (HCC) E11.9 250.00    4.  Murmur, cardiac R01.1 785.2 ECHO ADULT COMPLETE         Discussion: Patient with h/o longstanding HTN - now well controlled - presents at this time stable from a cardiac perspective with subtle early systolic possibly outflow tract murmur. EKG showing possible LVH by voltage criteria; septal and/or concentric hypertrophy may cause of murmur. Pt is currently asymptomatic. Pleased with present status. DDx: heart murmur, hypertensive heart disease, LVH, concentric hypertrophy, IHSS, other cardiomyopathy, aortic stenosis, mitral valve disease/regurgitation, valvular vegetation    Discussed/seen with Dr. Mekhi Smalls: 1. Continue same meds. -- Will get TTE to evaluate murmur and need for ABX prophylaxis prior to dental work. 2. Lipid profile and labs followed by PCP. 3.Encouraged to exercise to tolerance, lose weight, and follow low fat, low cholesterol, low sodium predominantly Plant-based (consider Mediterranean) diet. 4.Follow up: 1 month to follow up test results by phone and/or f/u here in office if needed. .     --  Call with questions or concerns. I have discussed the diagnosis with the patient and the intended plan as seen in the above orders. The patient has received an after-visit summary and questions were answered concerning future plans. I have discussed any concerning medication side effects and warnings with the patient as well. Mikey Hoffman PA-C  4/17/2019      Patient seen and examined. All pertinent data reviewed. I have reviewed detailed note as outlined by Jonathan Leahy. Case discussed with Nursing/medical assistant staff and Jonathan Leahy. Patient presents for evaluation of heart murmur known since her 30's. Likely flow murmur. Echo requested. Plans as outlined. Miguel Ángel Dunlap

## 2019-04-17 NOTE — PROGRESS NOTES
Chief Complaint   Patient presents with    New Patient    Hypertension     1. Have you been to the ER, urgent care clinic since your last visit? Hospitalized since your last visit? No    2. Have you seen or consulted any other health care providers outside of the [address] [place] [address] since your last visit? Include any pap smears or colon screening.  No

## 2019-04-30 ENCOUNTER — HOSPITAL ENCOUNTER (OUTPATIENT)
Dept: NON INVASIVE DIAGNOSTICS | Age: 64
Discharge: HOME | End: 2019-04-30
Attending: PHYSICIAN ASSISTANT
Payer: COMMERCIAL

## 2019-04-30 DIAGNOSIS — I10 HYPERTENSION, UNSPECIFIED TYPE: ICD-10-CM

## 2019-04-30 DIAGNOSIS — R01.1 MURMUR, CARDIAC: ICD-10-CM

## 2019-04-30 PROCEDURE — 93306 TTE W/DOPPLER COMPLETE: CPT

## 2019-05-03 ENCOUNTER — DOCUMENTATION ONLY (OUTPATIENT)
Dept: CARDIOLOGY CLINIC | Age: 64
End: 2019-05-03

## 2019-05-03 NOTE — PROGRESS NOTES
Echocardiography was performed in the outpatient setting in [facility] on April 30, 2019. As of today, the study has not been successfully transferred into the [facility] program so that it can be interpreted and published to the chart. The  has been notified, and the test will be reported as soon as it can be made available.     Yovany Cabrera MD, [facility]

## 2019-05-03 NOTE — PROGRESS NOTES
Cardiology: The [facility] system has never received the echo for [facility] so that it can be formally reported. However, the study is reviewed in the universal viewing system in order to provide a preliminary report. Left ventricle shows normal systolic performance without any regional abnormalities of wall motion. Ejection fraction is well over 50%. Right ventricular size is upper limit of normal with normal systolic function. Pulmonary artery pressures are normal by estimation derived from a tiny tricuspid regurgitation jet. The aortic valve shows minor sclerotic changes but otherwise it is a functioning trileaflet structure without stenosis or significant insufficiency. The LV shows grade 1 diastolic dysfunction which is probably reversible.   Forward output is well-maintained, no other structural abnormalities are noted and the pericardium is normal.    Hopefully formal report in the usual format can be submitted within the next few days    Aaron Kamara MD, [facility]

## 2019-05-08 ENCOUNTER — OFFICE VISIT (OUTPATIENT)
Dept: INTERNAL MEDICINE CLINIC | Age: 64
End: 2019-05-08

## 2019-05-08 VITALS
SYSTOLIC BLOOD PRESSURE: 129 MMHG | HEIGHT: 65 IN | OXYGEN SATURATION: 99 % | DIASTOLIC BLOOD PRESSURE: 82 MMHG | HEART RATE: 76 BPM | RESPIRATION RATE: 16 BRPM | TEMPERATURE: 97.7 F | BODY MASS INDEX: 31.36 KG/M2 | WEIGHT: 188.2 LBS

## 2019-05-08 DIAGNOSIS — E11.9 TYPE 2 DIABETES MELLITUS WITHOUT COMPLICATION, WITHOUT LONG-TERM CURRENT USE OF INSULIN (HCC): Primary | ICD-10-CM

## 2019-05-08 DIAGNOSIS — I10 ESSENTIAL HYPERTENSION: ICD-10-CM

## 2019-05-08 DIAGNOSIS — E78.2 MIXED HYPERLIPIDEMIA: ICD-10-CM

## 2019-05-08 LAB — HBA1C MFR BLD HPLC: 6.7 %

## 2019-05-08 NOTE — PROGRESS NOTES
Kathie Henson is a 59 y.o. female Chief Complaint Patient presents with  Diabetes A1C check Visit Vitals /82 (BP 1 Location: Left arm, BP Patient Position: Sitting) Pulse 76 Temp 97.7 °F (36.5 °C) (Oral) Resp 16 Ht 5' 5\" (1.651 m) Wt 188 lb 3.2 oz (85.4 kg) LMP 03/25/1999 SpO2 99% BMI 31.32 kg/m² Health Maintenance Due Topic Date Due  Pneumococcal 0-64 years (1 of 1 - PPSV23) 03/22/1961  
 FOOT EXAM Q1  03/22/1965  DTaP/Tdap/Td series (1 - Tdap) 03/22/1976  Shingrix Vaccine Age 50> (1 of 2) 03/22/2005  MICROALBUMIN Q1  09/13/2017  
 HEMOGLOBIN A1C Q6M  05/12/2019 1. Have you been to the ER, urgent care clinic since your last visit? Hospitalized since your last visit? No 
 
2. Have you seen or consulted any other health care providers outside of the [address] [place] [address] since your last visit? Include any pap smears or colon screening.  No

## 2019-05-08 NOTE — PROGRESS NOTES
SUBJECTIVE:  
Ms. Hitesh Fonseca is a 59 y.o. female who is here for follow up of routine medical issues. Pt's weight today in the office was 188 lb, down 7 lb x 11/2018. Pt endorses regular exercise. She has been monitoring her diet for healthy choices. DM: Pt is compliant in taking metformin. Patient denies numbness/tingling in extremities, fatigue, dizziness, polydipsia, polyuria, polyphagia, pancreatitis, increased sugar consumption, sore/bleeding gums, blurred vision, or cuts that will not heal. Pt's POC A1c today was 6.7%, improved from 7.1% in 11/2018. HTN: Pt's BP in the office today was 129/82. Pt is compliant in taking metoprolol tartrate, lisinopril, nisoldipine, and HCTZ. Patient denies chest pain, VALDEZ/SOB, edema, headache, visual changes, dizziness, palpitations or syncope. HLD: Pt is compliant in taking rosuvastatin. Patient denies abdominal pain, nausea, vomiting, diarrhea, arthralgias/myalgias due to the medication. Pt's 11/2018 lipid panel was normal.  
 
Pt reports she followed up with Dr. Hector Chaidez in 4/2019 for further evaluation of her heart murmur. Pt states her EKG was normal. She had an Echo on 4/30/19 and is awaiting results. At this time, she is otherwise doing well and has brought no other complaints to my attention today. For a list of the medical issues addressed today, see the assessment and plan below. PMH:  
Past Medical History:  
Diagnosis Date  Diabetes ([facility] [address]  Hypercholesterolemia  Hypertension  Other ill-defined conditions(799.89)   
 high cholesterol PSH:  has a past surgical history that includes hx tubal ligation; hx hysterectomy (3/25/99); hx orthopaedic; and hx rotator cuff repair (10/2017). All: is allergic to percocet [oxycodone-acetaminophen]. MEDS:  
Current Outpatient Medications Medication Sig  
 mv-min/iron/folic/calcium/vitK (WOMEN'S MULTIVITAMIN PO) Take  by mouth.  metoprolol tartrate (LOPRESSOR) 25 mg tablet TAKE 1 TABLET BY MOUTH TWICE DAILY  Nisoldipine (SULAR) 34 mg Tb24 SR tablet TAKE 1 TABLET BY MOUTH EVERY DAY  metFORMIN (GLUCOPHAGE) 500 mg tablet TAKE ONE TABLET BY MOUTH DAILY  lisinopril (PRINIVIL, ZESTRIL) 40 mg tablet TAKE 1 TABLET BY MOUTH EVERY DAY  rosuvastatin (CRESTOR) 10 mg tablet TAKE 1 TABLET BY MOUTH ONCE EVERY EVENING  Blood-Glucose Meter misc One Touch Ultra. Use as directed to check blood sugars twice daily  glucose blood VI test strips (ONETOUCH ULTRA TEST) strip Check blood sugars daily  Indications: twice daily  hydrochlorothiazide (HYDRODIURIL) 25 mg tablet TAKE ONE TABLET BY MOUTH DAILY  omega-3 fatty acids-vitamin e (FISH OIL) 1,000 mg cap Take 1 Cap by mouth.  fish oil-omega-3 fatty acids 340-1,000 mg capsule Take 1 Cap by mouth daily.  ergocalciferol (VITAMIN D2) 50,000 unit capsule Take 1,000 Units by mouth. No current facility-administered medications for this visit. FH: family history includes Breast Cancer in her paternal aunt; Cancer in her father; Cancer (age of onset: 64) in her mother; Diabetes in her sister; Hypertension in her brother, brother, father, mother, sister, sister, and sister. SH:  reports that she quit smoking about 29 years ago. She quit after 15.00 years of use. She has never used smokeless tobacco. She reports that she drinks alcohol. Review of Systems - History obtained from the patient General ROS: no fever, chills, fatigue, body aches Psychological ROS: no change in anxiety, depression, SI/HI Ophthalmic ROS: no blurred vision, myopia, double vision ENT ROS: no dysphagia, otalgia, otorrhea, rhinorrhea, post nasal drip Respiratory ROS: no cough, shortness of breath, or wheezing Cardiovascular ROS: no chest pain or dyspnea on exertion Gastrointestinal ROS: no abdominal pain, change in bowel habits, or black or bloody stools Genito-Urinary ROS: no frequency, urgency, incontinence, dysuria, hematuria Musculoskeletal ROS: no arthralgia, myalgia Neurological ROS: no headaches, dizziness, lightheadedness, tremors, seizures Dermatological ROS: no rash or lesions OBJECTIVE:  
Vitals:  
Visit Vitals /82 (BP 1 Location: Left arm, BP Patient Position: Sitting) Pulse 76 Temp 97.7 °F (36.5 °C) (Oral) Resp 16 Ht 5' 5\" (1.651 m) Wt 188 lb 3.2 oz (85.4 kg) LMP 03/25/1999 SpO2 99% BMI 31.32 kg/m² Gen: Pleasant 59 y.o.  female in NAD. HEENT: PERRLA. EOMI. OP moist and pink. Neck: Supple. No LAD. HEART: RRR, No G/R.     
LUNGS: CTAB No W/R. EXTREMITIES: Warm. No C/C/E.   
MUSCULOSKELETAL: Normal ROM, muscle strength 5/5 all groups. NEURO: Alert and oriented x 3. Cranial nerves grossly intact. No focal sensory or motor deficits noted. SKIN: Warm. Dry. No rashes or other lesions noted. ASSESSMENT/ PLAN: Diagnoses and all orders for this visit: 
 
1. Type 2 diabetes mellitus without complication, without long-term current use of insulin ([facility] [address] -     AMB POC HEMOGLOBIN A1C 
-     HEMOGLOBIN A1C WITH EAG 
 
 
  ICD-10-CM ICD-9-CM 1. Type 2 diabetes mellitus without complication, without long-term current use of insulin ([facility]) E11.9 250.00 AMB POC HEMOGLOBIN A1C HEMOGLOBIN A1C WITH EAG 1. DM type 2 Pt's blood sugar seems to be well controlled. I congratulated pt on the improvement in her A1c (6.7% today) and encouraged her to keep up the good work. I advised pt to continue current dose of metformin, avoid sugars and starches, and to increase exercise. Recheck hemoglobin A1c and weight in 3 mos with a lab/nurse visit. Pt's weight goal for 8/2019 is 178 lb. 2. HTN: Bp is controlled on the current regimen Pt's BP in the office today was 129/82. Pt is compliant in taking metoprolol tartrate, lisinopril, nisoldipine, and HCTZ Follow-up and Dispositions · Return in about 6 months (around 11/8/2019) for follow up and 3 mo for lab visit. I have reviewed the patient's medications and risks/side effects/benefits were discussed. Diagnosis(-es) explained to patient and questions answered. Literature provided where appropriate.   
 
Written by Mono Love, as dictated by Lucy Hayes MD.

## 2019-05-15 LAB
ECHO AO ROOT DIAM: 2.04 CM
ECHO AV AREA PLAN: 2.1 CM2
ECHO EST RA PRESSURE: 5 MMHG
ECHO LA AREA 4C: 17 CM2
ECHO LA MAJOR AXIS: 3.73 CM
ECHO LA TO AORTIC ROOT RATIO: 1.83
ECHO LA VOL 4C: 44.03 ML (ref 22–52)
ECHO LA VOLUME INDEX A4C: 22.84 ML/M2 (ref 16–28)
ECHO LV EDV A4C: 110.7 ML
ECHO LV EDV INDEX A4C: 57.4 ML/M2
ECHO LV EJECTION FRACTION A4C: 52 %
ECHO LV ESV A4C: 52.7 ML
ECHO LV ESV INDEX A4C: 27.3 ML/M2
ECHO LV INTERNAL DIMENSION DIASTOLIC: 4.85 CM (ref 3.9–5.3)
ECHO LV INTERNAL DIMENSION SYSTOLIC: 3.62 CM
ECHO LV IVSD: 1.14 CM (ref 0.6–0.9)
ECHO LV MASS 2D: 222.6 G (ref 67–162)
ECHO LV MASS INDEX 2D: 115.5 G/M2 (ref 43–95)
ECHO LV POSTERIOR WALL DIASTOLIC: 1 CM (ref 0.6–0.9)
ECHO LVOT DIAM: 1.8 CM
ECHO LVOT PEAK GRADIENT: 2.7 MMHG
ECHO LVOT PEAK VELOCITY: 82.6 CM/S
ECHO MV A VELOCITY: 41.79 CM/S
ECHO MV AREA PHT: 5.9 CM2
ECHO MV AREA PLAN: 4.8 CM2
ECHO MV E DECELERATION TIME (DT): 121.2 MS
ECHO MV E VELOCITY: 30.32 CM/S
ECHO MV E/A RATIO: 0.73
ECHO MV MAX VELOCITY: 53.65 CM/S
ECHO MV MEAN GRADIENT: 0.4 MMHG
ECHO MV PEAK GRADIENT: 1.2 MMHG
ECHO MV PRESSURE HALF TIME (PHT): 37.4 MS
ECHO MV VTI: 14.01 CM
ECHO PULMONARY ARTERY SYSTOLIC PRESSURE (PASP): 30 MMHG
ECHO PV MAX VELOCITY: 78.79 CM/S
ECHO PV PEAK GRADIENT: 2.5 MMHG
ECHO RA AREA 4C: 14.48 CM2
ECHO RIGHT VENTRICULAR SYSTOLIC PRESSURE (RVSP): 30 MMHG
ECHO TV REGURGITANT MAX VELOCITY: 250 CM/S
ECHO TV REGURGITANT PEAK GRADIENT: 25 MMHG

## 2019-05-24 ENCOUNTER — TELEPHONE (OUTPATIENT)
Dept: CARDIOLOGY CLINIC | Age: 64
End: 2019-05-24

## 2019-05-24 NOTE — TELEPHONE ENCOUNTER
I called the patient at work. I informed her that Dr. Savana Weber sent me the results of your test. He instructed me to let you know,  echo shows low normal function and no heart valve blockage     Heart sound or murmur is caused by blood flow across moderate calcification and thickening of one of 3 of aortic valve leaflets but no narrowing. Good news!!!!     F/u 6 months if no questions. Nothing to worry about     Repeat echo in 1 year       She acknowledged understanding. She wanted to know if she should continue taking antibiotics prior to dental procedure because they told her it was not necessary. I told her if the dentist is recommending that it is not necessary then you should follow his direction. I told her I will propose this to Dr. Savana Weber and upon his reply call you back.

## 2019-05-24 NOTE — TELEPHONE ENCOUNTER
----- Message from Janet Marc MD sent at 5/23/2019  8:54 PM EDT -----  Regarding: ECHO  Mark Mcintosh,    Let her know that echo shows low normal function and no heart valve blockage    Heart sound or murmur is caused by blood flow across moderate calcification and thickening of one of 3 of aortic valve leaflets but no narrowing. Good news!!!!    F/u 6 months if no questions. Nothing to worry about    Repeat echo in 1 year    thx    B

## 2019-05-29 NOTE — TELEPHONE ENCOUNTER
I spoke with the patient. I informed her that Dr. Lee Reveal did reply. He said, \"If you are having a tooth pulled or having your teeth cleaned ABT is not necessary, but it you are having more extensive work done then ABT may be needed. She acknowledged understanding and thanked me for the call.

## 2019-06-05 ENCOUNTER — OFFICE VISIT (OUTPATIENT)
Dept: SURGERY | Age: 64
End: 2019-06-05

## 2019-06-05 VITALS
RESPIRATION RATE: 18 BRPM | HEART RATE: 77 BPM | BODY MASS INDEX: 31.16 KG/M2 | TEMPERATURE: 98.3 F | HEIGHT: 65 IN | WEIGHT: 187 LBS | OXYGEN SATURATION: 98 % | DIASTOLIC BLOOD PRESSURE: 61 MMHG | SYSTOLIC BLOOD PRESSURE: 115 MMHG

## 2019-06-05 DIAGNOSIS — Z12.31 VISIT FOR SCREENING MAMMOGRAM: ICD-10-CM

## 2019-06-05 DIAGNOSIS — Z90.710 S/P HYSTERECTOMY: ICD-10-CM

## 2019-06-05 DIAGNOSIS — Z01.419 ENCOUNTER FOR GYNECOLOGICAL EXAMINATION WITHOUT ABNORMAL FINDING: Primary | ICD-10-CM

## 2019-06-05 NOTE — PROGRESS NOTES
Annual exam ages 40-58 post hysterectomy    Angelika Prieto is a ,  59 y.o. female [address] Patient's last menstrual period was 1999. .    She presents for her annual checkup. She is having no significant problems. With regard to the Gardasil vaccine, she is older than the age for which it is FDA approved. Hormonal status:  S/p hysterectomy for fibroids  She is not having vasomotor symptoms. The patient is not using any ERT. Sexual history:    She  reports that she currently engages in sexual activity and has had partners who are Male. She reports using the following methods of birth control/protection: Surgical and Inserts. Medical conditions:    Since her last annual GYN exam about one year ago, she has not the following changes in her health history: none. Surgical history confirmed with patient. has a past surgical history that includes hx tubal ligation; hx hysterectomy (3/25/99); hx orthopaedic; and hx rotator cuff repair (10/2017). Pap and Mammogram History:    Her most recent Pap smear was  - normal.  S/p hysterectomy. No hx of abnormal paps. The patient with normal mammogram 10/2018. Breast Cancer History/Substance Abuse: negative    Osteoporosis History:    Family history does not include a first or second degree relative with osteopenia or osteoporosis. A bone density scan was obtained  and revealed normal bone structure. She is currently taking calcium and vit D.     Past Medical History:   Diagnosis Date    Diabetes ([address]     Hypercholesterolemia     Hypertension     Other ill-defined conditions(799.89)     high cholesterol     Past Surgical History:   Procedure Laterality Date    HX HYSTERECTOMY  3/25/99    [facility]    HX ORTHOPAEDIC      left foot surgery    HX ROTATOR CUFF REPAIR  10/2017    HX TUBAL LIGATION         Current Outpatient Medications   Medication Sig Dispense Refill    rosuvastatin (CRESTOR) 10 mg tablet TAKE 1 TABLET BY MOUTH EVERY EVENING 30 Tab 11    mv-min/iron/folic/calcium/vitK (WOMEN'S MULTIVITAMIN PO) Take  by mouth.  fish oil-omega-3 fatty acids 340-1,000 mg capsule Take 1 Cap by mouth daily.  metoprolol tartrate (LOPRESSOR) 25 mg tablet TAKE 1 TABLET BY MOUTH TWICE DAILY 180 Tab 1    Nisoldipine (SULAR) 34 mg Tb24 SR tablet TAKE 1 TABLET BY MOUTH EVERY DAY 30 Tab 6    metFORMIN (GLUCOPHAGE) 500 mg tablet TAKE ONE TABLET BY MOUTH DAILY 90 Tab 3    lisinopril (PRINIVIL, ZESTRIL) 40 mg tablet TAKE 1 TABLET BY MOUTH EVERY DAY 30 Tab 12    Blood-Glucose Meter misc One Touch Ultra. Use as directed to check blood sugars twice daily 1 Each 0    glucose blood VI test strips (ONETOUCH ULTRA TEST) strip Check blood sugars daily  Indications: twice daily 100 Strip 11    hydrochlorothiazide (HYDRODIURIL) 25 mg tablet TAKE ONE TABLET BY MOUTH DAILY 30 Tab 6    ergocalciferol (VITAMIN D2) 50,000 unit capsule Take 1,000 Units by mouth.  omega-3 fatty acids-vitamin e (FISH OIL) 1,000 mg cap Take 1 Cap by mouth. Allergies: Percocet [oxycodone-acetaminophen]     Tobacco History:  reports that she quit smoking about 29 years ago. She quit after 15.00 years of use. She has never used smokeless tobacco.  Alcohol Abuse:  reports that she drinks alcohol. Socially   Drug Abuse:  has no drug history on file. Patient feels safe at home.     Family Medical/Cancer History:   Family History   Problem Relation Age of Onset    Hypertension Mother     Cancer Mother 64        Stomach cancer    Hypertension Father     Cancer Father         Stomach cancer    Hypertension Sister     Diabetes Sister     Hypertension Brother     Hypertension Sister     Hypertension Sister     Hypertension Brother     Breast Cancer Paternal Aunt         over 48        Review of Systems - History obtained from the patient  Constitutional: negative for weight loss, fever, night sweats  HEENT: negative for hearing loss, earache, congestion, snoring, sorethroat  CV: negative for chest pain, palpitations, edema  Resp: negative for cough, shortness of breath, wheezing  GI: negative for change in bowel habits, abdominal pain, black or bloody stools  : negative for frequency, dysuria, hematuria, vaginal discharge  MSK: negative for back pain, joint pain, muscle pain  Breast: negative for breast lumps, nipple discharge, galactorrhea  Skin :negative for itching, rash, hives  Neuro: negative for dizziness, headache, confusion, weakness  Psych: negative for anxiety, depression, change in mood  Heme/lymph: negative for bleeding, bruising, pallor    Physical Exam    Visit Vitals  /61 (BP 1 Location: Left arm, BP Patient Position: Sitting)   Pulse 77   Temp 98.3 °F (36.8 °C) (Oral)   Resp 18   Ht 5' 5\" (1.651 m)   Wt 187 lb (84.8 kg)   LMP 03/25/1999   SpO2 98%   BMI 31.12 kg/m²     Constitutional  · Appearance: well-nourished, well developed, alert, in no acute distress    HENT  · Head and Face: appears normal    Neck  · Inspection/Palpation: normal appearance, no masses or tenderness  · Lymph Nodes: no lymphadenopathy present  · Thyroid: gland size normal, nontender, no nodules or masses present on palpation    Chest  · Respiratory Effort: breathing unlabored  · Auscultation: normal breath sounds    Cardiovascular  · Heart:  · Auscultation: regular rate and rhythm without murmur    Breasts  · Inspection of Breasts: breasts symmetrical, no skin changes, no discharge present, nipple appearance normal, no skin retraction present  · Palpation of Breasts and Axillae: no masses present on palpation, no breast tenderness  · Axillary Lymph Nodes: no lymphadenopathy present    Gastrointestinal  · Abdominal Examination: abdomen non-tender to palpation, normal bowel sounds, no masses present  · Liver and spleen: no hepatomegaly present, spleen not palpable  · Hernias: no hernias identified    Genitourinary  · External Genitalia: normal appearance for age, no discharge present, no tenderness present, no inflammatory lesions present, no masses present, no atrophy present  · Vagina: normal vaginal vault without central or paravaginal defects, no discharge present, no inflammatory lesions present, no masses present  · Bladder: non-tender to palpation  · Urethra: appears normal  · Cervix: absent  · Uterus: absent  · Adnexa: no adnexal tenderness present, no adnexal masses present  · Perineum: perineum within normal limits, no evidence of trauma, no rashes or skin lesions present  · Anus: anus within normal limits, no hemorrhoids present  · Inguinal Lymph Nodes: no lymphadenopathy present    Skin  · General Inspection: no rash, no lesions identified    Neurologic/Psychiatric  · Mental Status:  · Orientation: grossly oriented to person, place and time  · Mood and Affect: mood normal, affect appropriate    Assessment:  Routine gynecologic examination  Her current medical status is satisfactory with no evidence of significant gynecologic issues.     Plan:  - pap smear not indicated  - mammogram up to date  - Dexa up to date  Counseled re: diet, exercise, healthy lifestyle  Return for yearly wellness visits  Rec annual mammogram

## 2019-06-05 NOTE — PROGRESS NOTES
Chief Complaint   Patient presents with    Well Woman     Pt presents in office for annual exam with no complaints. Last pap 2017. Last mammogram 2018.    3 most recent PHQ Screens 6/5/2019   Little interest or pleasure in doing things Not at all   Feeling down, depressed, irritable, or hopeless Not at all   Total Score PHQ 2 0     1. Have you been to the ER, urgent care clinic since your last visit? Hospitalized since your last visit? No    2. Have you seen or consulted any other health care providers outside of the [address] [place] [address] since your last visit? Include any pap smears or colon screening.  No

## 2019-06-05 NOTE — PATIENT INSTRUCTIONS
Learning About Breast Cancer Screening  What is breast cancer screening? Breast cancer occurs when cells that are not normal grow in one or both of your breasts. Screening tests can help find breast cancer early. Cancer is easier to treat when it's found early. Having concerns about breast cancer is common. That's why it's important to talk with your doctor about when to start and how often to get screened for breast cancer. How is breast cancer screening done? Several screening tests can be used to check for breast cancer. · Mammograms check for signs of cancer using X-rays. They can show tumors that are too small for you or your doctor to feel. During a mammogram, a machine squeezes your breasts to make them flatter and easier to X-ray. At least two pictures are taken of each breast. One is taken from the top and one from the side. · 3-D mammograms are also called digital breast tomosynthesis. Your breast is positioned on a flat plate. A top plate is pressed against your breast to keep it in position. The X-ray arm then moves in an arc above the breast and takes many pictures. A computer uses these X-rays to create a three-dimensional image. · Clinical breast exams are a doctor's exam. Your doctor carefully feels your breasts and under your arms to check for lumps or other changes. After the screening, your doctor will tell you the results. You will also be told if you need any follow-up tests. When should you get screened? Talk with your doctor about when you should start being tested for breast cancer. How often you get tested and the kind of tests you get will depend on your age and your risk. The guidelines that follow are for women who have an average risk for breast cancer. If you have a higher risk for breast cancer, such as having a family history of breast cancer in multiple relatives or at a young age, your doctor may recommend different screening for you.   · Ages 21 to 44: Some experts recommend that women have a clinical breast exam every 3 years, starting at age 21. Ask your doctor how often you should have this test. If you have a high risk for breast cancer, talk with your doctor about when to start yearly mammograms and other screening tests. · Ages 36 and older: Talk with your doctor about how often you should have mammograms and clinical breast exams. What is your risk for breast cancer? If you don't already know your risk of breast cancer, you can ask your doctor about it. You can also look it up at www.cancer.gov/bcrisktool/. If your doctor says that you have a high or very high risk, ask about ways to reduce your risk. These could include getting extra screening, taking medicine, or having surgery. If you have a strong family history of breast cancer, ask your doctor about genetic testing. What steps can you take to stay healthy? Some things that increase your risk of breast cancer, such as your age and being female, cannot be controlled. But you can do some things to stay as healthy as you can. · Learn what your breasts normally look and feel like. If you notice any changes, tell your doctor. · Drink alcohol wisely. Your risk goes up the more you drink. For the best health, women should have no more than 1 drink a day or 7 drinks a week. · If you smoke, quit. When you quit smoking, you lower your chances of getting many types of cancer. You can also do your best to eat well, be active, and stay at a healthy weight. Eating healthy foods and being active every day, as well as staying at a healthy weight, may help prevent cancer. Where can you learn more? Go to http://marianna-beti.info/. Enter V491 in the search box to learn more about \"Learning About Breast Cancer Screening. \"  Current as of: March 27, 2018  Content Version: 11.9  © 6464-7378 [employer].  Care instructions adapted under license by [employer] (which disclaims liability or warranty for this information). If you have questions about a medical condition or this instruction, always ask your healthcare professional. [place] [address] any warranty or liability for your use of this information. Well Visit, Women 48 to 72: Care Instructions  Your Care Instructions    Physical exams can help you stay healthy. Your doctor has checked your overall health and may have suggested ways to take good care of yourself. He or she also may have recommended tests. At home, you can help prevent illness with healthy eating, regular exercise, and other steps. Follow-up care is a key part of your treatment and safety. Be sure to make and go to all appointments, and call your doctor if you are having problems. It's also a good idea to know your test results and keep a list of the medicines you take. How can you care for yourself at home? · Reach and stay at a healthy weight. This will lower your risk for many problems, such as obesity, diabetes, heart disease, and high blood pressure. · Get at least 30 minutes of exercise on most days of the week. Walking is a good choice. You also may want to do other activities, such as running, swimming, cycling, or playing tennis or team sports. · Do not smoke. Smoking can make health problems worse. If you need help quitting, talk to your doctor about stop-smoking programs and medicines. These can increase your chances of quitting for good. · Protect your skin from too much sun. When you're outdoors from 10 a.m. to 4 p.m., stay in the shade or cover up with clothing and a hat with a wide brim. Wear sunglasses that block UV rays. Even when it's cloudy, put broad-spectrum sunscreen (SPF 30 or higher) on any exposed skin. · See a dentist one or two times a year for checkups and to have your teeth cleaned. · Wear a seat belt in the car. · Limit alcohol to 1 drink a day. Too much alcohol can cause health problems.   Follow your doctor's advice about when to have certain tests. These tests can spot problems early. · Cholesterol. Your doctor will tell you how often to have this done based on your age, family history, or other things that can increase your risk for heart attack and stroke. · Blood pressure. Have your blood pressure checked during a routine doctor visit. Your doctor will tell you how often to check your blood pressure based on your age, your blood pressure results, and other factors. · Mammogram. Ask your doctor how often you should have a mammogram, which is an X-ray of your breasts. A mammogram can spot breast cancer before it can be felt and when it is easiest to treat. · Pap test and pelvic exam. Ask your doctor how often you should have a Pap test. You may not need to have a Pap test as often as you used to. · Vision. Have your eyes checked every year or two or as often as your doctor suggests. Some experts recommend that you have yearly exams for glaucoma and other age-related eye problems starting at age 48. · Hearing. Tell your doctor if you notice any change in your hearing. You can have tests to find out how well you hear. · Diabetes. Ask your doctor whether you should have tests for diabetes. · Colon cancer. You should begin tests for colon cancer at age 48. You may have one of several tests. Your doctor will tell you how often to have tests based on your age and risk. Risks include whether you already had a precancerous polyp removed from your colon or whether your parents, sisters and brothers, or children have had colon cancer. · Thyroid disease. Talk to your doctor about whether to have your thyroid checked as part of a regular physical exam. Women have an increased chance of a thyroid problem. · Osteoporosis. You should begin tests for bone density at age 72. If you are younger than 72, ask your doctor whether you have factors that may increase your risk for this disease.  You may want to have this test before age 72. · Heart attack and stroke risk. At least every 4 to 6 years, you should have your risk for heart attack and stroke assessed. Your doctor uses factors such as your age, blood pressure, cholesterol, and whether you smoke or have diabetes to show what your risk for a heart attack or stroke is over the next 10 years. When should you call for help? Watch closely for changes in your health, and be sure to contact your doctor if you have any problems or symptoms that concern you. Where can you learn more? Go to http://marianna-beti.info/. Enter S522 in the search box to learn more about \"Well Visit, Women 50 to 72: Care Instructions. \"  Current as of: March 28, 2018  Content Version: 11.9  © 1111-9197 [employer]. Care instructions adapted under license by [employer] (which disclaims liability or warranty for this information). If you have questions about a medical condition or this instruction, always ask your healthcare professional. [place] [address] any warranty or liability for your use of this information.

## 2019-08-09 ENCOUNTER — TELEPHONE (OUTPATIENT)
Dept: INTERNAL MEDICINE CLINIC | Age: 64
End: 2019-08-09

## 2019-08-09 DIAGNOSIS — E11.9 TYPE 2 DIABETES MELLITUS WITHOUT COMPLICATION, WITHOUT LONG-TERM CURRENT USE OF INSULIN (HCC): Primary | ICD-10-CM

## 2019-08-09 NOTE — TELEPHONE ENCOUNTER
Hemoglobin A1c order placed per verbal read back. Call was placed to patient message was left for patient.

## 2019-08-09 NOTE — TELEPHONE ENCOUNTER
#[phone] pt states she was told to come back in August to get A1C checked again. Please put order in system and call pt to let her know when that is in there so she can come in. Thanks.

## 2019-08-15 LAB
EST. AVERAGE GLUCOSE BLD GHB EST-MCNC: 143 MG/DL
HBA1C MFR BLD: 6.6 % (ref 4.8–5.6)

## 2019-08-19 NOTE — TELEPHONE ENCOUNTER
A1c-Your current hgbA1c of 6.6 is lower than your last level of 7.1. Keep up the good work! Continue to work on following a diabetic diet and exercise. Recheck this test: hgbA1c  in  3 months. Continue with current  medications.

## 2019-10-23 ENCOUNTER — HOSPITAL ENCOUNTER (OUTPATIENT)
Dept: MAMMOGRAPHY | Age: 64
Discharge: HOME | End: 2019-10-23
Attending: FAMILY MEDICINE
Payer: COMMERCIAL

## 2019-10-23 DIAGNOSIS — Z12.31 VISIT FOR SCREENING MAMMOGRAM: ICD-10-CM

## 2019-10-23 PROCEDURE — 77067 SCR MAMMO BI INCL CAD: CPT

## 2019-11-06 RX ORDER — METFORMIN HYDROCHLORIDE 500 MG/1
TABLET ORAL
Qty: 90 TAB | Refills: 0 | Status: SHIPPED | OUTPATIENT
Start: 2019-11-06 | End: 2020-02-05 | Stop reason: SDUPTHER

## 2019-11-11 ENCOUNTER — OFFICE VISIT (OUTPATIENT)
Dept: INTERNAL MEDICINE CLINIC | Age: 64
End: 2019-11-11

## 2019-11-11 VITALS
HEART RATE: 80 BPM | RESPIRATION RATE: 16 BRPM | BODY MASS INDEX: 30.96 KG/M2 | HEIGHT: 65 IN | WEIGHT: 185.8 LBS | OXYGEN SATURATION: 99 % | TEMPERATURE: 98.1 F | DIASTOLIC BLOOD PRESSURE: 72 MMHG | SYSTOLIC BLOOD PRESSURE: 123 MMHG

## 2019-11-11 DIAGNOSIS — I10 ESSENTIAL HYPERTENSION: ICD-10-CM

## 2019-11-11 DIAGNOSIS — N95.9 MENOPAUSAL AND POSTMENOPAUSAL DISORDER: ICD-10-CM

## 2019-11-11 DIAGNOSIS — E11.9 TYPE 2 DIABETES MELLITUS WITHOUT COMPLICATION, WITHOUT LONG-TERM CURRENT USE OF INSULIN (HCC): Primary | ICD-10-CM

## 2019-11-11 DIAGNOSIS — F32.89 OTHER DEPRESSION: ICD-10-CM

## 2019-11-11 RX ORDER — VENLAFAXINE HYDROCHLORIDE 37.5 MG/1
37.5 CAPSULE, EXTENDED RELEASE ORAL DAILY
Qty: 30 CAP | Refills: 1 | Status: SHIPPED | OUTPATIENT
Start: 2019-11-11 | End: 2021-06-07

## 2019-11-11 NOTE — PROGRESS NOTES
SUBJECTIVE:   Ms. Morro Beth is a 59 y.o. female who is here for follow up of routine medical issues. HTN: Pt's BP in the office today was 123/72. Pt is compliant in taking lisinopril, Lopressor, HCTZ, and nisoldipine. Patient denies chest pain, VALDEZ/SOB, edema, headache, visual changes, dizziness, palpitations or syncope. Pt reports that she has not been exercising recently but intends to start soon. DM: Pt is compliant in taking metformin. Patient denies numbness/tingling in extremities, fatigue, dizziness, polydipsia, polyuria, polyphagia, pancreatitis, increased sugar consumption, sore/bleeding gums, blurred vision, or cuts that will not heal. Last HgA1c was 6.6% on 8/14/2019. She notes that her BG was 126 this morning. PREVENTIVE:  Mammogram: UTD (10/23/2019)  Pneumonia vaccine: Declined. Shingrix: Declined. Flu: Declined. HLD: Pt is compliant in taking rosuvastatin. Patient denies abdominal pain, nausea, vomiting, diarrhea, arthralgias/myalgias due to the medication. Last lipid panel on 11/12/2018 discussed with pt showed normal total cholesterol (149), normal triglycerides (72), and normal LDL (85). Pt c/o severe night sweats and hot flashes as postmenopausal sx. She expressed interest in Effexor and mentioned that she has been under significant stress. She mentioned that her boyfriend is going through cancer treatments and she is taking care of him. Pt denies crying spells. At this time, she is otherwise doing well and has brought no other complaints to my attention today. For a list of the medical issues addressed today, see the assessment and plan below.     PMH:   Past Medical History:   Diagnosis Date    Diabetes ([facility] [address]     Hypercholesterolemia     Hypertension     Other ill-defined conditions(799.89)     high cholesterol     PSH:  has a past surgical history that includes hx tubal ligation; hx hysterectomy (3/25/99); hx orthopaedic; and hx rotator cuff repair (10/2017). All: is allergic to percocet [oxycodone-acetaminophen]. MEDS:   Current Outpatient Medications   Medication Sig    venlafaxine-SR (EFFEXOR-XR) 37.5 mg capsule Take 1 Cap by mouth daily.  glucose blood VI test strips (ONETOUCH ULTRA TEST) strip Check blood sugars daily  Indications: twice daily    metFORMIN (GLUCOPHAGE) 500 mg tablet TAKE 1 TABLET BY MOUTH DAILY    lisinopril (PRINIVIL, ZESTRIL) 40 mg tablet TAKE 1 TABLET BY MOUTH EVERY DAY    metoprolol tartrate (LOPRESSOR) 25 mg tablet TAKE 1 TABLET BY MOUTH TWICE DAILY    Nisoldipine (SULAR) 34 mg Tb24 SR tablet TAKE 1 TABLET BY MOUTH EVERY DAY    rosuvastatin (CRESTOR) 10 mg tablet TAKE 1 TABLET BY MOUTH EVERY EVENING    mv-min/iron/folic/calcium/vitK (WOMEN'S MULTIVITAMIN PO) Take  by mouth.  Blood-Glucose Meter misc One Touch Ultra. Use as directed to check blood sugars twice daily    hydrochlorothiazide (HYDRODIURIL) 25 mg tablet TAKE ONE TABLET BY MOUTH DAILY    ergocalciferol (VITAMIN D2) 50,000 unit capsule Take 1,000 Units by mouth.  fish oil-omega-3 fatty acids 340-1,000 mg capsule Take 1 Cap by mouth daily.  omega-3 fatty acids-vitamin e (FISH OIL) 1,000 mg cap Take 1 Cap by mouth. No current facility-administered medications for this visit. FH: family history includes Breast Cancer in her paternal aunt; Cancer in her father; Cancer (age of onset: 64) in her mother; Diabetes in her sister; Hypertension in her brother, brother, father, mother, sister, sister, and sister. SH:  reports that she quit smoking about 29 years ago. She quit after 15.00 years of use. She has never used smokeless tobacco. She reports that she drinks alcohol. Review of Systems - History obtained from the patient  General ROS: +hot flashes/night sweats. no fever, chills, fatigue, body aches  Psychological ROS: +stress.  no change in anxiety, depression, SI/HI  Ophthalmic ROS: no blurred vision, myopia, double vision  ENT ROS: no dysphagia, otalgia, otorrhea, rhinorrhea, post nasal drip  Respiratory ROS: no cough, shortness of breath, or wheezing  Cardiovascular ROS: no chest pain or dyspnea on exertion  Gastrointestinal ROS: no abdominal pain, change in bowel habits, or black or bloody stools  Genito-Urinary ROS: no frequency, urgency, incontinence, dysuria, hematuria  Musculoskeletal ROS: no arthralgia, myalgia  Neurological ROS: no headaches, dizziness, lightheadedness, tremors, seizures  Dermatological ROS: no rash or lesions    OBJECTIVE:   Vitals:   Visit Vitals  /72 (BP 1 Location: Right arm, BP Patient Position: Sitting)   Pulse 80   Temp 98.1 °F (36.7 °C) (Oral)   Resp 16   Ht 5' 5\" (1.651 m)   Wt 185 lb 12.8 oz (84.3 kg)   LMP 03/25/1999   SpO2 99%   BMI 30.92 kg/m²      Gen: Pleasant 59 y.o.  female in NAD. HEENT: PERRLA. EOMI. OP moist and pink. HEART: RRR, No M/G/R.      LUNGS: CTAB No W/R.     NEURO: Alert and oriented x 3. Cranial nerves grossly intact. No focal sensory or motor deficits noted. SKIN: Warm. Dry. No rashes or other lesions noted. ASSESSMENT/ PLAN: Diagnoses and all orders for this visit:    1. Type 2 diabetes mellitus without complication, without long-term current use of insulin (HCC)  -     METABOLIC PANEL, COMPREHENSIVE  -     HEMOGLOBIN A1C WITH EAG  -     LIPID PANEL  -     venlafaxine-SR (EFFEXOR-XR) 37.5 mg capsule; Take 1 Cap by mouth daily.  -     MICROALBUMIN, UR, RAND W/ MICROALB/CREAT RATIO  -     glucose blood VI test strips (ONETOUCH ULTRA TEST) strip; Check blood sugars daily  Indications: twice daily    2. Essential hypertension  -     METABOLIC PANEL, COMPREHENSIVE    3. Menopausal and postmenopausal disorder  -     venlafaxine-SR (EFFEXOR-XR) 37.5 mg capsule; Take 1 Cap by mouth daily. 4. Other depression  -     venlafaxine-SR (EFFEXOR-XR) 37.5 mg capsule; Take 1 Cap by mouth daily. ICD-10-CM ICD-9-CM    1.  Type 2 diabetes mellitus without complication, without long-term current use of insulin ([facility]) L80.4 301.53 METABOLIC PANEL, COMPREHENSIVE      HEMOGLOBIN A1C WITH EAG      LIPID PANEL      venlafaxine-SR (EFFEXOR-XR) 37.5 mg capsule      MICROALBUMIN, UR, RAND W/ MICROALB/CREAT RATIO      glucose blood VI test strips (ONETOUCH ULTRA TEST) strip   2. Essential hypertension A63 627.6 METABOLIC PANEL, COMPREHENSIVE   3. Menopausal and postmenopausal disorder N95.9 627.9 venlafaxine-SR (EFFEXOR-XR) 37.5 mg capsule   4. Other depression F32.89 311 venlafaxine-SR (EFFEXOR-XR) 37.5 mg capsule      1. DM d/t Underlying Condition w/ Hyperosmolarity w/o Coma  Pt's blood sugar seems to be well controlled. I advised pt to continue current dose of metformin, avoid sugars and starches, and to increase exercise when possible. Recheck hemoglobin A1c, CMP, Microalbumin UR and lipid panel. 2. Hypertension  BP seems to be well controlled. I recommended continuing current dose of lisinopril, Lopressor, HCTZ, and nisoldipine, eating a low sodium diet, and increasing exercise. Recheck CMP. 3. Menopausal and Postmenopausal Disorder  I prescribed Effexor-XR 37.5mg capsule to be taken every day for management of hot flashes and night sweats. 4. Other Depression  I prescribed Effexor-XR 37.5mg capsule to be taken every day for management of depressive sx. I have reviewed the patient's medications and risks/side effects/benefits were discussed. Diagnosis(-es) explained to patient and questions answered. Literature provided where appropriate.      Written by Amarilys Ortiz, as dictated by Cameron Marquez MD.

## 2019-11-11 NOTE — PROGRESS NOTES
Identified pt with two pt identifiers(name and ). Reviewed record in preparation for visit and have obtained necessary documentation. Chief Complaint   Patient presents with    Diabetes     f/u     Hypertension        Visit Vitals  /72 (BP 1 Location: Right arm, BP Patient Position: Sitting)   Pulse 80   Temp 98.1 °F (36.7 °C) (Oral)   Resp 16   Ht 5' 5\" (1.651 m)   Wt 185 lb 12.8 oz (84.3 kg)   SpO2 99%   BMI 30.92 kg/m²       Health Maintenance Due   Topic    Pneumococcal 0-64 years (1 of 1 - PPSV23)    FOOT EXAM Q1     DTaP/Tdap/Td series (1 - Tdap)    Shingrix Vaccine Age 50> (1 of 2)    MICROALBUMIN Q1     LIPID PANEL Q1        Med Reconciliation: Completed    Coordination of Care Questionnaire:  :   1) Have you been to an emergency room, urgent care, or hospitalized since your last visit? If yes, where when, and reason for visit? Yes, [facility] back on  due to an Abscess. 2. Have seen or consulted any other health care provider since your last visit? If yes, where when, and reason for visit? No       3) Do you have an Advanced Directive/ Living Will in place? No  If yes, do we have a copy on file   If no, would you like information     Patient is accompanied by self I have received verbal consent from Medora Gitelman to discuss any/all medical information while they are present in the room.

## 2019-11-12 LAB
ALBUMIN SERPL-MCNC: 4.7 G/DL (ref 3.6–4.8)
ALBUMIN/CREAT UR: 27.4 MG/G CREAT (ref 0–30)
ALBUMIN/GLOB SERPL: 1.9 {RATIO} (ref 1.2–2.2)
ALP SERPL-CCNC: 78 IU/L (ref 39–117)
ALT SERPL-CCNC: 31 IU/L (ref 0–32)
AST SERPL-CCNC: 22 IU/L (ref 0–40)
BILIRUB SERPL-MCNC: 0.3 MG/DL (ref 0–1.2)
BUN SERPL-MCNC: 14 MG/DL (ref 8–27)
BUN/CREAT SERPL: 16 (ref 12–28)
CALCIUM SERPL-MCNC: 10.2 MG/DL (ref 8.7–10.3)
CHLORIDE SERPL-SCNC: 105 MMOL/L (ref 96–106)
CHOLEST SERPL-MCNC: 155 MG/DL (ref 100–199)
CO2 SERPL-SCNC: 28 MMOL/L (ref 20–29)
CREAT SERPL-MCNC: 0.87 MG/DL (ref 0.57–1)
CREAT UR-MCNC: 174.1 MG/DL
EST. AVERAGE GLUCOSE BLD GHB EST-MCNC: 140 MG/DL
GLOBULIN SER CALC-MCNC: 2.5 G/DL (ref 1.5–4.5)
GLUCOSE SERPL-MCNC: 118 MG/DL (ref 65–99)
HBA1C MFR BLD: 6.5 % (ref 4.8–5.6)
HDLC SERPL-MCNC: 58 MG/DL
LDLC SERPL CALC-MCNC: 86 MG/DL (ref 0–99)
MICROALBUMIN UR-MCNC: 47.7 UG/ML
POTASSIUM SERPL-SCNC: 4.2 MMOL/L (ref 3.5–5.2)
PROT SERPL-MCNC: 7.2 G/DL (ref 6–8.5)
SODIUM SERPL-SCNC: 146 MMOL/L (ref 134–144)
TRIGL SERPL-MCNC: 56 MG/DL (ref 0–149)
VLDLC SERPL CALC-MCNC: 11 MG/DL (ref 5–40)

## 2019-11-13 NOTE — PROGRESS NOTES
CMP-Normal electrolyte levels except for a mild elevation in the glucose and sodium  Levels. You have normal renal and liver function  A1c- Your current hgbA1c is slightly lower than your last level of 6.6. Keep up the good work! Continue to work on following a diabetic diet and exercise. Recheck this test: hgbA1c  in  3 months. Continue with current  medications.   All other results are normal.

## 2020-02-04 DIAGNOSIS — E08.00 DIABETES MELLITUS DUE TO UNDERLYING CONDITION WITH HYPEROSMOLARITY WITHOUT COMA, WITHOUT LONG-TERM CURRENT USE OF INSULIN (HCC): Primary | ICD-10-CM

## 2020-02-04 RX ORDER — METFORMIN HYDROCHLORIDE 500 MG/1
TABLET ORAL
Qty: 90 TAB | Refills: 0 | OUTPATIENT
Start: 2020-02-04

## 2020-02-04 RX ORDER — METFORMIN HYDROCHLORIDE 500 MG/1
TABLET ORAL
Qty: 90 TAB | Refills: 0 | Status: CANCELLED | OUTPATIENT
Start: 2020-02-04

## 2020-02-04 NOTE — TELEPHONE ENCOUNTER
Caller (if not patient): n/a   Relationship of caller (if not patient): n/a   Best contact number(s): [phone]   Name of medication and dosage if known: Metformin   Is patient out of this medication (yes/no):yes   Pharmacy name: [facility]   Pharmacy listed in chart? (yes/no): yes   Pharmacy phone number: [phone]   Details to clarify the request: Pt is completely out of medications needs ASAP  / Antonio Rousseau

## 2020-02-06 NOTE — TELEPHONE ENCOUNTER
Patient states she needs a call back to get the status of refill request on Metformin received on 2/4/20 still pending & patient needs approval. Please call to discuss.  Thank you

## 2020-02-07 RX ORDER — METFORMIN HYDROCHLORIDE 500 MG/1
TABLET ORAL
Qty: 90 TAB | Refills: 1 | Status: SHIPPED | OUTPATIENT
Start: 2020-02-07 | End: 2022-02-18 | Stop reason: SDUPTHER

## 2020-02-07 RX ORDER — METFORMIN HYDROCHLORIDE 500 MG/1
TABLET ORAL
Qty: 90 TAB | Refills: 1 | Status: SHIPPED | OUTPATIENT
Start: 2020-02-07 | End: 2020-08-14

## 2020-02-26 RX ORDER — NISOLDIPINE 34 MG/1
TABLET, FILM COATED, EXTENDED RELEASE ORAL
Qty: 30 TAB | Refills: 6 | Status: SHIPPED | OUTPATIENT
Start: 2020-02-26 | End: 2020-09-22

## 2020-03-14 DIAGNOSIS — I10 ESSENTIAL HYPERTENSION: ICD-10-CM

## 2020-03-15 RX ORDER — METOPROLOL TARTRATE 25 MG/1
TABLET, FILM COATED ORAL
Qty: 180 TAB | Refills: 1 | Status: SHIPPED | OUTPATIENT
Start: 2020-03-15 | End: 2020-09-14

## 2020-04-25 DIAGNOSIS — E78.2 MIXED HYPERLIPIDEMIA: ICD-10-CM

## 2020-04-27 RX ORDER — LISINOPRIL 40 MG/1
TABLET ORAL
Qty: 30 TAB | Refills: 6 | Status: SHIPPED | OUTPATIENT
Start: 2020-04-27 | End: 2020-12-31

## 2020-04-27 RX ORDER — ROSUVASTATIN CALCIUM 10 MG/1
TABLET, COATED ORAL
Qty: 30 TAB | Refills: 11 | Status: SHIPPED | OUTPATIENT
Start: 2020-04-27 | End: 2021-03-26

## 2020-08-14 DIAGNOSIS — E08.00 DIABETES MELLITUS DUE TO UNDERLYING CONDITION WITH HYPEROSMOLARITY WITHOUT COMA, WITHOUT LONG-TERM CURRENT USE OF INSULIN (HCC): ICD-10-CM

## 2020-08-14 RX ORDER — METFORMIN HYDROCHLORIDE 500 MG/1
TABLET ORAL
Qty: 90 TAB | Refills: 1 | Status: SHIPPED | OUTPATIENT
Start: 2020-08-14 | End: 2021-02-11

## 2020-09-13 DIAGNOSIS — I10 ESSENTIAL HYPERTENSION: ICD-10-CM

## 2020-09-14 RX ORDER — METOPROLOL TARTRATE 25 MG/1
TABLET, FILM COATED ORAL
Qty: 180 TAB | Refills: 1 | Status: SHIPPED | OUTPATIENT
Start: 2020-09-14 | End: 2021-03-14

## 2020-09-22 RX ORDER — NISOLDIPINE 34 MG/1
TABLET, FILM COATED, EXTENDED RELEASE ORAL
Qty: 30 TAB | Refills: 6 | Status: SHIPPED | OUTPATIENT
Start: 2020-09-22 | End: 2020-10-19

## 2020-09-24 ENCOUNTER — OFFICE VISIT (OUTPATIENT)
Dept: OBGYN CLINIC | Age: 65
End: 2020-09-24
Payer: MEDICARE

## 2020-09-24 VITALS
SYSTOLIC BLOOD PRESSURE: 121 MMHG | WEIGHT: 191.5 LBS | HEIGHT: 65 IN | BODY MASS INDEX: 31.9 KG/M2 | DIASTOLIC BLOOD PRESSURE: 72 MMHG

## 2020-09-24 DIAGNOSIS — Z01.419 ENCOUNTER FOR GYNECOLOGICAL EXAMINATION WITHOUT ABNORMAL FINDING: Primary | ICD-10-CM

## 2020-09-24 PROCEDURE — G8754 DIAS BP LESS 90: HCPCS | Performed by: OBSTETRICS & GYNECOLOGY

## 2020-09-24 PROCEDURE — G0101 CA SCREEN;PELVIC/BREAST EXAM: HCPCS | Performed by: OBSTETRICS & GYNECOLOGY

## 2020-09-24 PROCEDURE — G8432 DEP SCR NOT DOC, RNG: HCPCS | Performed by: OBSTETRICS & GYNECOLOGY

## 2020-09-24 PROCEDURE — 3017F COLORECTAL CA SCREEN DOC REV: CPT | Performed by: OBSTETRICS & GYNECOLOGY

## 2020-09-24 PROCEDURE — 1090F PRES/ABSN URINE INCON ASSESS: CPT | Performed by: OBSTETRICS & GYNECOLOGY

## 2020-09-24 PROCEDURE — 1101F PT FALLS ASSESS-DOCD LE1/YR: CPT | Performed by: OBSTETRICS & GYNECOLOGY

## 2020-09-24 PROCEDURE — G8752 SYS BP LESS 140: HCPCS | Performed by: OBSTETRICS & GYNECOLOGY

## 2020-09-24 PROCEDURE — G9899 SCRN MAM PERF RSLTS DOC: HCPCS | Performed by: OBSTETRICS & GYNECOLOGY

## 2020-09-24 PROCEDURE — G8419 CALC BMI OUT NRM PARAM NOF/U: HCPCS | Performed by: OBSTETRICS & GYNECOLOGY

## 2020-09-24 NOTE — PROGRESS NOTES
Annual exam    Emy Hernandez is a 72 y.o. presenting for annual exam.   Her main concerns today include none  S/p TLH    Ob/Gyn Hx:    Patient's last menstrual period was 1999. Health maintenance:  Pap-UTD  Mammo-in october  Colonoscopy- due in 5 years    Past Medical History:   Diagnosis Date    Diabetes ([address]     Hypercholesterolemia     Hypertension     Other ill-defined conditions(799.89)     high cholesterol       Past Surgical History:   Procedure Laterality Date    HX HYSTERECTOMY  3/25/99    [facility]    HX ORTHOPAEDIC      left foot surgery    HX ROTATOR CUFF REPAIR  10/2017    HX TUBAL LIGATION         Family History   Problem Relation Age of Onset    Hypertension Mother     Cancer Mother 64        Stomach cancer    Hypertension Father     Cancer Father         Stomach cancer    Hypertension Sister     Diabetes Sister     Hypertension Brother     Hypertension Sister     Hypertension Sister     Hypertension Brother     Breast Cancer Paternal Aunt         over 48       Social History     Socioeconomic History    Marital status:      Spouse name: Not on file    Number of children: Not on file    Years of education: Not on file    Highest education level: Not on file   Occupational History    Not on file   Social Needs    Financial resource strain: Not on file    Food insecurity     Worry: Not on file     Inability: Not on file    Transportation needs     Medical: Not on file     Non-medical: Not on file   Tobacco Use    Smoking status: Former Smoker     Years: 15.00     Last attempt to quit: 1990     Years since quittin.5    Smokeless tobacco: Never Used   Substance and Sexual Activity    Alcohol use:  Yes    Drug use: Not on file    Sexual activity: Yes     Partners: Male     Birth control/protection: Surgical, Inserts   Lifestyle    Physical activity     Days per week: Not on file     Minutes per session: Not on file    Stress: Not on file Relationships    Social connections     Talks on phone: Not on file     Gets together: Not on file     Attends [employer] service: Not on file     Active member of club or organization: Not on file     Attends meetings of clubs or organizations: Not on file     Relationship status: Not on file    Intimate partner violence     Fear of current or ex partner: Not on file     Emotionally abused: Not on file     Physically abused: Not on file     Forced sexual activity: Not on file   Other Topics Concern    Not on file   Social History Narrative    Not on file       Current Outpatient Medications   Medication Sig Dispense Refill    Nisoldipine (SULAR) 34 mg Tb24 SR tablet TAKE 1 TABLET BY MOUTH EVERY DAY 30 Tab 6    metoprolol tartrate (LOPRESSOR) 25 mg tablet TAKE 1 TABLET BY MOUTH TWICE DAILY 180 Tab 1    hydroCHLOROthiazide (HYDRODIURIL) 25 mg tablet TAKE 1 TABLET BY MOUTH DAILY 90 Tab 0    metFORMIN (GLUCOPHAGE) 500 mg tablet TAKE 1 TABLET BY MOUTH DAILY 90 Tab 1    lisinopriL (PRINIVIL, ZESTRIL) 40 mg tablet TAKE 1 TABLET BY MOUTH EVERY DAY 30 Tab 6    rosuvastatin (CRESTOR) 10 mg tablet TAKE 1 TABLET BY MOUTH EVERY EVENING 30 Tab 11    metFORMIN (GLUCOPHAGE) 500 mg tablet TAKE 1 TABLET BY MOUTH DAILY 90 Tab 1    venlafaxine-SR (EFFEXOR-XR) 37.5 mg capsule Take 1 Cap by mouth daily. 30 Cap 1    glucose blood VI test strips (ONETOUCH ULTRA TEST) strip Check blood sugars daily  Indications: twice daily 100 Strip 11    mv-min/iron/folic/calcium/vitK (WOMEN'S MULTIVITAMIN PO) Take  by mouth.  fish oil-omega-3 fatty acids 340-1,000 mg capsule Take 1 Cap by mouth daily.  Blood-Glucose Meter misc One Touch Ultra. Use as directed to check blood sugars twice daily 1 Each 0    hydrochlorothiazide (HYDRODIURIL) 25 mg tablet TAKE ONE TABLET BY MOUTH DAILY 30 Tab 6    ergocalciferol (VITAMIN D2) 50,000 unit capsule Take 1,000 Units by mouth.       omega-3 fatty acids-vitamin e (FISH OIL) 1,000 mg cap Take 1 Cap by mouth.            Allergies   Allergen Reactions    Percocet [Oxycodone-Acetaminophen] Hives       Review of Systems - History obtained from the patient  Constitutional: negative for weight loss, fever, night sweats  HEENT: negative for hearing loss, earache, congestion, snoring, sorethroat  CV: negative for chest pain, palpitations, edema  Resp: negative for cough, shortness of breath, wheezing  GI: negative for change in bowel habits, abdominal pain, black or bloody stools  : negative for frequency, dysuria, hematuria, vaginal discharge  MSK: negative for back pain, joint pain, muscle pain  Breast: negative for breast lumps, nipple discharge, galactorrhea  Skin :negative for itching, rash, hives  Neuro: negative for dizziness, headache, confusion, weakness  Psych: negative for anxiety, depression, change in mood  Heme/lymph: negative for bleeding, bruising, pallor    Physical Exam    Visit Vitals  LMP 03/25/1999       Constitutional  · Appearance: well-nourished, well developed, alert, in no acute distress    HENT  · Head and Face: appears normal    Neck  · Inspection/Palpation: normal appearance, no masses or tenderness  · Lymph Nodes: no lymphadenopathy present  · Thyroid: gland size normal, nontender, no nodules or masses present on palpation    Chest  · Respiratory Effort: non-labored breathing  · Auscultation: CTAB, normal breath sounds    Cardiovascular  · Heart:  · Auscultation: regular rate and rhythm without murmur  · Extremities: no peripheral edema    Breasts  · Inspection of Breasts: breasts symmetrical, no skin changes, no discharge present, nipple appearance normal, no skin retraction present  · Palpation of Breasts and Axillae: no masses present on palpation, no breast tenderness  · Axillary Lymph Nodes: no lymphadenopathy present    Gastrointestinal  · Abdominal Examination: abdomen non-tender to palpation, normal bowel sounds, no masses present  · Liver and spleen: no hepatomegaly present, spleen not palpable  · Hernias: no hernias identified    Genitourinary  · External Genitalia: normal appearance for age, no discharge present, no tenderness present, no inflammatory lesions present, no masses present, +atrophy of UG mucosa  · Vagina: normal vaginal vault without central or paravaginal defects, no discharge present, no inflammatory lesions present, no masses present  · Bladder: non-tender to palpation  · Urethra: appears normal  · Cervix: normal   · Perineum: perineum within normal limits, no evidence of trauma, no rashes or skin lesions present    Skin  · General Inspection: no rash, no lesions identified    Neurologic/Psychiatric  · Mental Status:  · Orientation: grossly oriented to person, place and time  · Mood and Affect: mood normal, affect appropriate      Assessment/Plan:  72 y.o. postmenopausal female overall doing well.      Health Maintenance:  -counseled re: diet, exercise, healthy lifestyle  -refer for mammo  -refer for colonoscopy  -refer for dexa scan    RTC: 1 year for annual wellness assessment, or sooner prn for problems or concerns  -handouts and instructions provided    [facility]  9/24/2020  10:26 AM     Signed By: Jamil Rivera MD     September 24, 2020

## 2020-09-24 NOTE — PATIENT INSTRUCTIONS
Pelvic Exam: Care Instructions  Your Care Instructions     When your doctor examines all of your pelvic organs, it's called a pelvic exam. Two good reasons to have this kind of exam are to check for sexually transmitted infections (STIs) and to get a Pap test. A Pap test is also called a Pap smear. It checks for early changes that can lead to cancer of the cervix. Sometimes a pelvic exam is part of a regular checkup. Your doctor may ask you to avoid vaginal sex, tampons, vaginal medicines, vaginal sprays or powders, and douching for 1 to 2 days before the test.  Other times, women have this kind of exam at any time of the month. This is because they have pelvic pain, bleeding, or discharge. Or they may have another pelvic problem. Before your exam, it's important to share some information with your doctor. For example, if you are a survivor of rape or sexual abuse, you can talk about any concerns you may have. Your doctor will also want to know if you are pregnant or use birth control. And he or she will want to hear about any problems, surgeries, or procedures you have had in your pelvic area. You will also need to tell your doctor when your last period was. Follow-up care is a key part of your treatment and safety. Be sure to make and go to all appointments, and call your doctor if you are having problems. It's also a good idea to know your test results and keep a list of the medicines you take. How is a pelvic exam done? · During a pelvic exam, you will:  ? Take off your clothes below the waist. You will get a paper or cloth cover to put over the lower half of your body. If this is regular checkup, you may undress completely and put on a gown. ? Lie on your back on an exam table. Your feet will be raised above you. Stirrups will support your feet. · The doctor will:  ? Ask you to relax your knees. Your knees need to lean out, toward the walls. ?  Check the opening of your vagina for sores or swelling. ? Gently put a tool called a speculum into your vagina. It opens the vagina a little bit. You will feel some pressure. But if you are relaxed, it will not hurt. It lets your doctor see inside the vagina. ? Use a small brush, spatula, or swab to get a sample of cells, if you are having a Pap test or culture. The doctor then removes the speculum. ? Put on gloves and put one or two fingers of one hand into your vagina. The other hand goes on your lower belly. This lets your doctor feel your pelvic organs. You will probably feel some pressure. Try to stay relaxed. ? Put one gloved finger into your rectum and one into your vagina, if needed. This can also help check your pelvic organs. This exam takes about 10 minutes. At the end, you will get a washcloth or tissue to clean your vaginal area. You can then get dressed. Why is a pelvic exam done? A pelvic exam may be done:  · As part of a woman's regular physical checkup. The exam may include a Pap test.  · To check for vaginal infection. · To check for sexually transmitted infections, such as chlamydia or herpes. · To help find the cause of abnormal uterine bleeding. · To look for problems like uterine fibroids, ovarian cysts, or uterine prolapse. · To find the cause of pelvic or belly pain. · Before inserting an intrauterine device (IUD) for birth control. · To collect evidence if you've been sexually assaulted. What are the risks of a pelvic exam?  There is a small chance that the doctor will find something on a pelvic exam that would not have caused a problem. This is called overdiagnosis. It could lead to tests or treatment you don't need. When should you call for help? Watch closely for changes in your health, and be sure to contact your doctor if you have any problems. Where can you learn more? Go to http://www.gray.com/  Enter M421 in the search box to learn more about \"Pelvic Exam: Care Instructions. \"  Current as of: November 8, 2019               Content Version: 12.6  © 3314-1195 [employer]. Care instructions adapted under license by [employer] (which disclaims liability or warranty for this information). If you have questions about a medical condition or this instruction, always ask your healthcare professional. [address] any warranty or liability for your use of this information.

## 2020-10-09 ENCOUNTER — TRANSCRIBE ORDER (OUTPATIENT)
Dept: SCHEDULING | Age: 65
End: 2020-10-09

## 2020-10-09 DIAGNOSIS — Z12.31 VISIT FOR SCREENING MAMMOGRAM: Primary | ICD-10-CM

## 2020-10-19 ENCOUNTER — OFFICE VISIT (OUTPATIENT)
Dept: INTERNAL MEDICINE CLINIC | Age: 65
End: 2020-10-19
Payer: MEDICARE

## 2020-10-19 VITALS
DIASTOLIC BLOOD PRESSURE: 78 MMHG | RESPIRATION RATE: 18 BRPM | WEIGHT: 189 LBS | HEART RATE: 79 BPM | SYSTOLIC BLOOD PRESSURE: 132 MMHG | HEIGHT: 64 IN | BODY MASS INDEX: 32.27 KG/M2 | TEMPERATURE: 96.4 F | OXYGEN SATURATION: 99 %

## 2020-10-19 DIAGNOSIS — I10 ESSENTIAL HYPERTENSION: ICD-10-CM

## 2020-10-19 DIAGNOSIS — Z11.59 NEED FOR HEPATITIS C SCREENING TEST: ICD-10-CM

## 2020-10-19 DIAGNOSIS — Z13.6 SCREENING FOR ISCHEMIC HEART DISEASE: ICD-10-CM

## 2020-10-19 DIAGNOSIS — E08.00 DIABETES MELLITUS DUE TO UNDERLYING CONDITION WITH HYPEROSMOLARITY WITHOUT COMA, WITHOUT LONG-TERM CURRENT USE OF INSULIN (HCC): ICD-10-CM

## 2020-10-19 DIAGNOSIS — E78.2 MIXED HYPERLIPIDEMIA: ICD-10-CM

## 2020-10-19 DIAGNOSIS — Z78.0 POSTMENOPAUSAL STATE: ICD-10-CM

## 2020-10-19 DIAGNOSIS — Z00.00 WELCOME TO MEDICARE PREVENTIVE VISIT: Primary | ICD-10-CM

## 2020-10-19 DIAGNOSIS — Z13.31 SCREENING FOR DEPRESSION: ICD-10-CM

## 2020-10-19 PROCEDURE — G0402 INITIAL PREVENTIVE EXAM: HCPCS | Performed by: FAMILY MEDICINE

## 2020-10-19 PROCEDURE — G8510 SCR DEP NEG, NO PLAN REQD: HCPCS | Performed by: FAMILY MEDICINE

## 2020-10-19 PROCEDURE — 1090F PRES/ABSN URINE INCON ASSESS: CPT | Performed by: FAMILY MEDICINE

## 2020-10-19 PROCEDURE — G8427 DOCREV CUR MEDS BY ELIG CLIN: HCPCS | Performed by: FAMILY MEDICINE

## 2020-10-19 PROCEDURE — G8417 CALC BMI ABV UP PARAM F/U: HCPCS | Performed by: FAMILY MEDICINE

## 2020-10-19 PROCEDURE — G8536 NO DOC ELDER MAL SCRN: HCPCS | Performed by: FAMILY MEDICINE

## 2020-10-19 RX ORDER — AMLODIPINE BESYLATE 5 MG/1
5 TABLET ORAL DAILY
Qty: 30 TAB | Refills: 1 | Status: SHIPPED | OUTPATIENT
Start: 2020-10-19 | End: 2020-12-13

## 2020-10-19 RX ORDER — INSULIN PUMP SYRINGE, 3 ML
EACH MISCELLANEOUS
Qty: 1 KIT | Refills: 0 | Status: SHIPPED | OUTPATIENT
Start: 2020-10-19

## 2020-10-19 NOTE — PROGRESS NOTES
Reviewed record in preparation for visit and have obtained necessary documentation. Identified pt with two pt identifiers(name and ). Chief Complaint   Patient presents with   [facility] Diabetes       Health Maintenance Due   Topic Date Due    Diabetic Foot Care  1965    Bone Mineral Density   2020    Annual Well Visit  2020    Albumin Urine Test  2020       Ms. Cindi Pulliam has a reminder for a \"due or due soon\" health maintenance. I have asked that she discuss this further with her primary care provider for follow-up on this health maintenance. Coordination of Care Questionnaire:  :     1) Have you been to an emergency room, urgent care clinic since your last visit? no   Hospitalized since your last visit? no             2) Have you seen or consulted any other health care providers outside of [address] [place] [address] since your last visit? no  (Include any pap smears or colon screenings in this section.)    3) In the event something were to happen to you and you were unable to speak on your behalf, do you have an Advance Directive/ Living Will in place stating your wishes? NO    Do you have an Advance Directive on file? no    4) Are you interested in receiving information on Advance Directives? NO    Patient is accompanied by self I have received verbal consent from Gunner Solomon to discuss any/all medical information while they are present in the room.

## 2020-10-19 NOTE — Clinical Note
Please mail ACP form to this patient. She did not get her EKG for the Welcome to [employer]. Please have her come back for a nurse visit.

## 2020-10-19 NOTE — PROGRESS NOTES
SUBJECTIVE:   Ms. Gracia Mckay is a 72 y.o. female who is here for follow up of routine medical issues. Pt is not fasting today. DM: Pt is compliant in taking metformin 500 mg tablet every day. Patient denies numbness/tingling in extremities, fatigue, dizziness, polydipsia, polyuria, polyphagia, pancreatitis, increased sugar consumption, sore/bleeding gums, blurred vision, or cuts that will not heal. Last HgA1c was 6.5% on 11/11/2019. Her weight has remained stable since her lov and she walks 35-40 minutes every day. HTN: Pt's BP in the office today was 132/78. Pt is compliant in taking HCTZ 25 mg tablet every day, Lopressor 25 mg tablet BID, and lisinopril 40 mg tablet every day. Patient denies chest pain, VALDEZ/SOB, edema, headache, visual changes, dizziness, palpitations or syncope. She notes that Sular 34 mg tablet every day is too expensive and is interested in switching. HLD: Pt is compliant in taking rosuvastatin 10 mg tablet every day. Patient denies abdominal pain, nausea, vomiting, diarrhea, arthralgias/myalgias due to the medication. Last lipid panel on 11/11/2019 discussed with pt showed nl total cholesterol (155), nl triglycerides (56), and nl LDL (86). She notes that she feels as though she has to clear her throat regularly from drainage. She has not been using an antihistamine. She quit smoking 30 years ago after smoking for 10 years. PREVENTIVE:  Colonoscopy: 03/2014  Mammogram: Scheduled 11/2020  Dexa: 03/2014, normal  Flu: declines  Pneumonia vaccine: declines  Shingrix: declines  Eye Exam: 09/2020  ACP: discussed and provided pt with packet. At this time, she is otherwise doing well and has brought no other complaints to my attention today. For a list of the medical issues addressed today, see the assessment and plan below.     PMH:   Past Medical History:   Diagnosis Date    Diabetes ([address]     Hypercholesterolemia     Hypertension     Other ill-defined conditions(799.89)     high cholesterol     PSH:  has a past surgical history that includes hx tubal ligation; hx hysterectomy (3/25/99); hx orthopaedic; and hx rotator cuff repair (10/2017). All: is allergic to percocet [oxycodone-acetaminophen]. MEDS:   Current Outpatient Medications   Medication Sig    amLODIPine (NORVASC) 5 mg tablet Take 1 Tab by mouth daily.  Blood-Glucose Meter monitoring kit Check glucose daily.  metoprolol tartrate (LOPRESSOR) 25 mg tablet TAKE 1 TABLET BY MOUTH TWICE DAILY    hydroCHLOROthiazide (HYDRODIURIL) 25 mg tablet TAKE 1 TABLET BY MOUTH DAILY    metFORMIN (GLUCOPHAGE) 500 mg tablet TAKE 1 TABLET BY MOUTH DAILY    lisinopriL (PRINIVIL, ZESTRIL) 40 mg tablet TAKE 1 TABLET BY MOUTH EVERY DAY    rosuvastatin (CRESTOR) 10 mg tablet TAKE 1 TABLET BY MOUTH EVERY EVENING    metFORMIN (GLUCOPHAGE) 500 mg tablet TAKE 1 TABLET BY MOUTH DAILY    venlafaxine-SR (EFFEXOR-XR) 37.5 mg capsule Take 1 Cap by mouth daily.  glucose blood VI test strips (ONETOUCH ULTRA TEST) strip Check blood sugars daily  Indications: twice daily    mv-min/iron/folic/calcium/vitK (WOMEN'S MULTIVITAMIN PO) Take  by mouth.  fish oil-omega-3 fatty acids 340-1,000 mg capsule Take 1 Cap by mouth daily.  Blood-Glucose Meter misc One Touch Ultra. Use as directed to check blood sugars twice daily    hydrochlorothiazide (HYDRODIURIL) 25 mg tablet TAKE ONE TABLET BY MOUTH DAILY    ergocalciferol (VITAMIN D2) 50,000 unit capsule Take 1,000 Units by mouth.  omega-3 fatty acids-vitamin e (FISH OIL) 1,000 mg cap Take 1 Cap by mouth. No current facility-administered medications for this visit. FH: family history includes Breast Cancer in her paternal aunt; Cancer in her father; Cancer (age of onset: 64) in her mother; Diabetes in her sister; Hypertension in her brother, brother, father, mother, sister, sister, and sister. SH:  reports that she quit smoking about 30 years ago.  She quit after 15.00 years of use. She has never used smokeless tobacco. She reports current alcohol use. She reports that she does not use drugs. Review of Systems - History obtained from the patient  General ROS: no fever, chills, fatigue, body aches  Psychological ROS: no change in anxiety, depression, SI/HI  Ophthalmic ROS: no blurred vision, myopia, double vision  ENT ROS: +drainage. no dysphagia, otalgia, otorrhea, rhinorrhea, post nasal drip  Respiratory ROS: no cough, shortness of breath, or wheezing  Cardiovascular ROS: no chest pain or dyspnea on exertion  Gastrointestinal ROS: no abdominal pain, change in bowel habits, or black or bloody stools  Genito-Urinary ROS: no frequency, urgency, incontinence, dysuria, hematuria  Musculoskeletal ROS: no arthralgia, myalgia  Neurological ROS: no headaches, dizziness, lightheadedness, tremors, seizures  Dermatological ROS: no rash or lesions    OBJECTIVE:   Vitals:   Visit Vitals  /78 (BP 1 Location: Right arm, BP Patient Position: Sitting)   Pulse 79   Temp (!) 96.4 °F (35.8 °C) (Temporal)   Resp 18   Ht 5' 4\" (1.626 m)   Wt 189 lb (85.7 kg)   LMP 03/25/1999   SpO2 99%   BMI 32.44 kg/m²      Gen: Pleasant 72 y.o.  female in NAD. HEENT: +cataracts present. PERRLA. EOMI. OP moist and pink. Ears: TMs normal and canals equal bilaterally. Neck: Supple. No LAD. No thyromegaly. HEART: RRR, No M/G/R.      LUNGS: CTAB No W/R. ABDOMEN: S, NT, ND, BS+. EXTREMITIES: Warm. No C/C/E  MUSCULOSKELETAL: Normal ROM. 5/5 muscle strength in all groups. NEURO: Alert and oriented x 3. Cranial nerves grossly intact. No focal sensory or motor deficits noted. SKIN: +sebhorreic keratosis on left lateral thigh. Warm. Dry. No rashes or other lesions noted. ASSESSMENT/ PLAN: Diagnoses and all orders for this visit:    1.  Welcome to Medicare preventive visit  -     [facility]; Future  -     AMB POC EKG ROUTINE W/ 12 LEADS, INTER & REP    2. Essential hypertension  -     METABOLIC PANEL, COMPREHENSIVE  -     CBC WITH AUTOMATED DIFF  -     amLODIPine (NORVASC) 5 mg tablet; Take 1 Tab by mouth daily. 3. Diabetes mellitus due to underlying condition with hyperosmolarity without coma, without long-term current use of insulin (HCC)  -     METABOLIC PANEL, COMPREHENSIVE  -     HEMOGLOBIN A1C WITH EAG  -     MICROALBUMIN, UR, RAND W/ MICROALB/CREAT RATIO  -     Blood-Glucose Meter monitoring kit; Check glucose daily. 4. Mixed hyperlipidemia    5. Screening for depression  -     DEPRESSION SCREEN ANNUAL    6. Screening for ischemic heart disease  -     LIPID PANEL  -     AMB POC EKG ROUTINE W/ 12 LEADS, INTER & REP    7. Need for hepatitis C screening test  -     HEPATITIS C AB    8. Postmenopausal state  -     DEXA BONE DENSITY STUDY AXIAL; Future        ICD-10-CM ICD-9-CM    1. Welcome to Medicare preventive visit  Z00.00 V70.0 DEPRESSION SCREEN ANNUAL      LIPID PANEL      HEPATITIS C AB      DEXA BONE DENSITY STUDY AXIAL      AMB POC EKG ROUTINE W/ 12 LEADS, INTER & REP   2. Essential hypertension  C26 145.7 METABOLIC PANEL, COMPREHENSIVE      CBC WITH AUTOMATED DIFF      amLODIPine (NORVASC) 5 mg tablet   3. Diabetes mellitus due to underlying condition with hyperosmolarity without coma, without long-term current use of insulin (HCC)  A47.03 781.21 METABOLIC PANEL, COMPREHENSIVE      HEMOGLOBIN A1C WITH EAG      MICROALBUMIN, UR, RAND W/ MICROALB/CREAT RATIO      Blood-Glucose Meter monitoring kit   4. Mixed hyperlipidemia  E78.2 272.2    5. Screening for depression  Z13.31 V79.0 DEPRESSION SCREEN ANNUAL   6. Screening for ischemic heart disease  Z13.6 V81.0 LIPID PANEL      AMB POC EKG ROUTINE W/ 12 LEADS, INTER & REP   7. Need for hepatitis C screening test  Z11.59 V73.89 HEPATITIS C AB   8. Postmenopausal state  Z78.0 V49.81 DEXA BONE DENSITY STUDY AXIAL      1.  Welcome to [facility] Visit  See attached note. Check EKG. 2. Essential Hypertension  BP seems to be well controlled. I recommended continuing current dose of HCTZ 25 mg tablet every day, Lopressor 25 mg tablet BID, and lisinopril 40 mg tablet every day, eating a low sodium diet, and increasing exercise. I prescribed amlodipine 10 mg tablet every day and asked her to check her BP at home regularly. Recheck CMP and CBC. 3. DM type 2  Pt's blood sugar seems to be well controlled. I advised pt to continue current dose of metformin 500 mg tablet every day, avoid sugars and starches, and to increase exercise when possible. Recheck CMP, hemoglobin A1c and microalbumin. 4. Mixed Hyperlipidemia   Lipid panel shows cholesterol seems to be well controlled. I recommended continuing current dose of  rosuvastatin 10 mg tablet every day, eating a low fat diet, and increasing exercise. Recheck lipid panel. 5. Screening for Depression  Depression screen reviewed and negative. 6. Screening for Ischemic Heart Disease  Recheck lipid panel. 7. Need for Hepatitis C Screening Test  Check Hep C antibodies. 8. Postmenopausal State  I ordered a Dexa for health maintenance. I advised her to try an antihistamine to manage the drainage. Follow-up and Dispositions    · Return in about 6 months (around 4/19/2021) for htn. I have reviewed the patient's medications and risks/side effects/benefits were discussed. Diagnosis(-es) explained to patient and questions answered. Literature provided where appropriate.      Written by Nedra Wasserman, as dictated by Kathia Gabriel MD.

## 2020-10-19 NOTE — PROGRESS NOTES
This is a \"Welcome to [employer]"  Initial Preventive Physical Examination (IPPE) providing Personalized Prevention Plan Services (Performed in the first 12 months of enrollment)    I have reviewed the patient's medical history in detail and updated the computerized patient record. History     Past Medical History:   Diagnosis Date    Diabetes ([address]     Hypercholesterolemia     Hypertension     Other ill-defined conditions(799.89)     high cholesterol      Past Surgical History:   Procedure Laterality Date    HX HYSTERECTOMY  3/25/99    SAR[place]NAN    HX ORTHOPAEDIC      left foot surgery    HX ROTATOR CUFF REPAIR  10/2017    HX TUBAL LIGATION       Current Outpatient Medications   Medication Sig Dispense Refill    Nisoldipine (SULAR) 34 mg Tb24 SR tablet TAKE 1 TABLET BY MOUTH EVERY DAY 30 Tab 6    metoprolol tartrate (LOPRESSOR) 25 mg tablet TAKE 1 TABLET BY MOUTH TWICE DAILY 180 Tab 1    hydroCHLOROthiazide (HYDRODIURIL) 25 mg tablet TAKE 1 TABLET BY MOUTH DAILY 90 Tab 0    metFORMIN (GLUCOPHAGE) 500 mg tablet TAKE 1 TABLET BY MOUTH DAILY 90 Tab 1    lisinopriL (PRINIVIL, ZESTRIL) 40 mg tablet TAKE 1 TABLET BY MOUTH EVERY DAY 30 Tab 6    rosuvastatin (CRESTOR) 10 mg tablet TAKE 1 TABLET BY MOUTH EVERY EVENING 30 Tab 11    metFORMIN (GLUCOPHAGE) 500 mg tablet TAKE 1 TABLET BY MOUTH DAILY 90 Tab 1    venlafaxine-SR (EFFEXOR-XR) 37.5 mg capsule Take 1 Cap by mouth daily. 30 Cap 1    glucose blood VI test strips (ONETOUCH ULTRA TEST) strip Check blood sugars daily  Indications: twice daily 100 Strip 11    mv-min/iron/folic/calcium/vitK (WOMEN'S MULTIVITAMIN PO) Take  by mouth.  fish oil-omega-3 fatty acids 340-1,000 mg capsule Take 1 Cap by mouth daily.  Blood-Glucose Meter misc One Touch Ultra.  Use as directed to check blood sugars twice daily 1 Each 0    hydrochlorothiazide (HYDRODIURIL) 25 mg tablet TAKE ONE TABLET BY MOUTH DAILY 30 Tab 6    ergocalciferol (VITAMIN D2) 50,000 unit capsule Take 1,000 Units by mouth.  omega-3 fatty acids-vitamin e (FISH OIL) 1,000 mg cap Take 1 Cap by mouth. Allergies   Allergen Reactions    Percocet [Oxycodone-Acetaminophen] Hives       Family History   Problem Relation Age of Onset    Hypertension Mother     Cancer Mother 64        Stomach cancer    Hypertension Father     Cancer Father         Stomach cancer    Hypertension Sister     Diabetes Sister     Hypertension Brother     Hypertension Sister     Hypertension Sister     Hypertension Brother     Breast Cancer Paternal Aunt         over 48     Social History     Tobacco Use    Smoking status: Former Smoker     Years: 15.00     Last attempt to quit: 1990     Years since quittin.5    Smokeless tobacco: Never Used   Substance Use Topics    Alcohol use: Yes       Depression Risk Screen     3 most recent PHQ Screens 10/19/2020   Little interest or pleasure in doing things Not at all   Feeling down, depressed, irritable, or hopeless Not at all   Total Score PHQ 2 0       Alcohol Risk Screen   Do you average more than 1 drink per night or more than 7 drinks a week:  Yes    On any one occasion in the past three months have you have had more than 3 drinks containing alcohol:  No          Functional Ability and Level of Safety   Diet: The patient is prescribed and follows a special diet. Hearing: Hearing is good. Vision Screening:  Vision is good. No exam data present     Activities of Daily Living: The home contains: no safety equipment. Patient does total self care     Ambulation: with no difficulty     Exercise level: extremely active     Fall Risk Screen:  Fall Risk Assessment, last 12 mths 2019   Able to walk? Yes   Fall in past 12 months?  No     Abuse Screen:  Patient is not abused       Screening EKG   EKG order placed: Yes    Patient Care Team   Patient Care Team:  Jacquelin Rosa MD as PCP - General (Family Medicine)  Jacquelin Rosa MD as PCP - REHABILITATION [facility] Empaneled Provider  Brionna Lui DPM (Podiatry)     End of Life Planning   Advanced care planning directives were discussed with the patient and /or family/caregiver. Assessment/Plan   Education and counseling provided:  Are appropriate based on today's review and evaluation  End-of-Life planning (with patient's consent)  Pneumococcal Vaccine  Screening Mammography  Cardiovascular screening blood test  Bone mass measurement (DEXA)  Screening for glaucoma    Diagnoses and all orders for this visit:    1. Welcome to Medicare preventive visit  -     [address]; Future    2. Essential hypertension  -     METABOLIC PANEL, COMPREHENSIVE  -     CBC WITH AUTOMATED DIFF    3. Diabetes mellitus due to underlying condition with hyperosmolarity without coma, without long-term current use of insulin (HCC)  -     METABOLIC PANEL, COMPREHENSIVE  -     HEMOGLOBIN A1C WITH EAG  -     MICROALBUMIN, UR, RAND W/ MICROALB/CREAT RATIO    4. Mixed hyperlipidemia    5. Screening for depression  -     DEPRESSION SCREEN ANNUAL    6. Screening for ischemic heart disease  -     LIPID PANEL    7. Need for hepatitis C screening test  -     HEPATITIS C AB    8. Postmenopausal state  -     DEXA BONE DENSITY STUDY AXIAL; Future        Health Maintenance Due   Topic Date Due    Foot Exam Q1  03/22/1965    Bone Densitometry (Dexa) Screening  03/22/2020    Medicare Yearly Exam  09/24/2020    MICROALBUMIN Q1  11/11/2020   ACP-ACP planning continued today. I explained the purpose of the advance medical directive. Patient expressed interest in reviewing material.  I provided the patient with a \"Your Right to Decide\" booklet, [employer] Kit, and a copy of an Advance Directive.   Patient agrees to providing a copy to the office when available.

## 2020-10-19 NOTE — PATIENT INSTRUCTIONS
Medicare Wellness Visit, Female The best way to live healthy is to have a lifestyle where you eat a well-balanced diet, exercise regularly, limit alcohol use, and quit all forms of tobacco/nicotine, if applicable. Regular preventive services are another way to keep healthy. Preventive services (vaccines, screening tests, monitoring & exams) can help personalize your care plan, which helps you manage your own care. Screening tests can find health problems at the earliest stages, when they are easiest to treat. [address] follows the current, evidence-based guidelines published by the [facility] Luis Angel Rahul ([facility]) when recommending preventive services for our patients. Because we follow these guidelines, sometimes recommendations change over time as research supports it. (For example, mammograms used to be recommended annually. Even though Medicare will still pay for an annual mammogram, the newer guidelines recommend a mammogram every two years for women of average risk.) Of course, you and your doctor may decide to screen more often for some diseases, based on your risk and your health status. Preventive services for you include: - Medicare offers their members a free annual wellness visit, which is time for you and your primary care provider to discuss and plan for your preventive service needs. Take advantage of this benefit every year! 
-All adults over the age of 72 should receive the recommended pneumonia vaccines. Current USPSTF guidelines recommend a series of two vaccines for the best pneumonia protection.  
-All adults should have a flu vaccine yearly and a tetanus vaccine every 10 years. All adults age 48 and older should receive a shingles vaccine once in their lifetime.   
-A bone mass density test is recommended when a woman turns 65 to screen for osteoporosis. This test is only recommended one time, as a screening. Some providers will use this same test as a disease monitoring tool if you already have osteoporosis. -All adults age 38-68 who are overweight should have a diabetes screening test once every three years.  
-Other screening tests and preventive services for persons with diabetes include: an eye exam to screen for diabetic retinopathy, a kidney function test, a foot exam, and stricter control over your cholesterol.  
-Cardiovascular screening for adults with routine risk involves an electrocardiogram (ECG) at intervals determined by your doctor.  
-Colorectal cancer screenings should be done for adults age 54-65 with no increased risk factors for colorectal cancer. There are a number of acceptable methods of screening for this type of cancer. Each test has its own benefits and drawbacks. Discuss with your doctor what is most appropriate for you during your annual wellness visit. The different tests include: colonoscopy (considered the best screening method), a fecal occult blood test, a fecal DNA test, and sigmoidoscopy. -Breast cancer screenings are recommended every other year for women of normal risk, age 54-69. 
-Cervical cancer screenings for women over age 72 are only recommended with certain risk factors.  
-All adults born between [facility] should be screened once for Hepatitis C. Here is a list of your current Health Maintenance items (your personalized list of preventive services) with a due date: 
Health Maintenance Due Topic Date Due  
 Diabetic Foot Care  03/22/1965  Bone Mineral Density   03/22/2020 [facility] Annual Well Visit  09/24/2020  Albumin Urine Test  11/11/2020 Medicare Wellness Visit, Female The best way to live healthy is to have a lifestyle where you eat a well-balanced diet, exercise regularly, limit alcohol use, and quit all forms of tobacco/nicotine, if applicable. Regular preventive services are another way to keep healthy.  Preventive services (vaccines, screening tests, monitoring & exams) can help personalize your care plan, which helps you manage your own care. Screening tests can find health problems at the earliest stages, when they are easiest to treat. [facility] follows the current, evidence-based guidelines published by the [facility] Luis Angel Kay ([facility]) when recommending preventive services for our patients. Because we follow these guidelines, sometimes recommendations change over time as research supports it. (For example, mammograms used to be recommended annually. Even though Medicare will still pay for an annual mammogram, the newer guidelines recommend a mammogram every two years for women of average risk). Of course, you and your doctor may decide to screen more often for some diseases, based on your risk and your co-morbidities (chronic disease you are already diagnosed with). Preventive services for you include: - Medicare offers their members a free annual wellness visit, which is time for you and your primary care provider to discuss and plan for your preventive service needs. Take advantage of this benefit every year! 
-All adults over the age of 72 should receive the recommended pneumonia vaccines. Current USPSTF guidelines recommend a series of two vaccines for the best pneumonia protection.  
-All adults should have a flu vaccine yearly and a tetanus vaccine every 10 years.  
-All adults age 48 and older should receive the shingles vaccines (series of two vaccines). -All adults age 38-68 who are overweight should have a diabetes screening test once every three years.  
-All adults born between [birth year] and 1965 should be screened once for Hepatitis C. 
-Other screening tests and preventive services for persons with diabetes include: an eye exam to screen for diabetic retinopathy, a kidney function test, a foot exam, and stricter control over your cholesterol. -Cardiovascular screening for adults with routine risk involves an electrocardiogram (ECG) at intervals determined by your doctor.  
-Colorectal cancer screenings should be done for adults age 54-65 with no increased risk factors for colorectal cancer. There are a number of acceptable methods of screening for this type of cancer. Each test has its own benefits and drawbacks. Discuss with your doctor what is most appropriate for you during your annual wellness visit. The different tests include: colonoscopy (considered the best screening method), a fecal occult blood test, a fecal DNA test, and sigmoidoscopy. 
 
-A bone mass density test is recommended when a woman turns 65 to screen for osteoporosis. This test is only recommended one time, as a screening. Some providers will use this same test as a disease monitoring tool if you already have osteoporosis. -Breast cancer screenings are recommended every other year for women of normal risk, age 54-69. 
-Cervical cancer screenings for women over age 72 are only recommended with certain risk factors. Here is a list of your current Health Maintenance items (your personalized list of preventive services) with a due date: 
Health Maintenance Due Topic Date Due  
 Diabetic Foot Care  03/22/1965  Bone Mineral Density   03/22/2020 [facility] Annual Well Visit  09/24/2020  Albumin Urine Test  11/11/2020

## 2020-10-20 ENCOUNTER — HOSPITAL ENCOUNTER (OUTPATIENT)
Dept: LAB | Age: 65
Discharge: HOME | End: 2020-10-20
Payer: MEDICARE

## 2020-10-20 PROCEDURE — 86803 HEPATITIS C AB TEST: CPT

## 2020-10-20 PROCEDURE — 80061 LIPID PANEL: CPT

## 2020-10-20 PROCEDURE — 80053 COMPREHEN METABOLIC PANEL: CPT

## 2020-10-20 PROCEDURE — 83036 HEMOGLOBIN GLYCOSYLATED A1C: CPT

## 2020-10-20 PROCEDURE — 36415 COLL VENOUS BLD VENIPUNCTURE: CPT

## 2020-10-20 PROCEDURE — 82043 UR ALBUMIN QUANTITATIVE: CPT

## 2020-10-20 PROCEDURE — 85025 COMPLETE CBC W/AUTO DIFF WBC: CPT

## 2020-10-21 LAB
ALBUMIN SERPL-MCNC: 5 G/DL (ref 3.8–4.8)
ALBUMIN/CREAT UR: 24 MG/G CREAT (ref 0–29)
ALBUMIN/GLOB SERPL: 2 {RATIO} (ref 1.2–2.2)
ALP SERPL-CCNC: 94 IU/L (ref 39–117)
ALT SERPL-CCNC: 43 IU/L (ref 0–32)
AST SERPL-CCNC: 27 IU/L (ref 0–40)
BASOPHILS # BLD AUTO: 0 X10E3/UL (ref 0–0.2)
BASOPHILS NFR BLD AUTO: 1 %
BILIRUB SERPL-MCNC: 0.3 MG/DL (ref 0–1.2)
BUN SERPL-MCNC: 6 MG/DL (ref 8–27)
BUN/CREAT SERPL: 8 (ref 12–28)
CALCIUM SERPL-MCNC: 10.4 MG/DL (ref 8.7–10.3)
CHLORIDE SERPL-SCNC: 103 MMOL/L (ref 96–106)
CHOLEST SERPL-MCNC: 166 MG/DL (ref 100–199)
CO2 SERPL-SCNC: 29 MMOL/L (ref 20–29)
CREAT SERPL-MCNC: 0.77 MG/DL (ref 0.57–1)
CREAT UR-MCNC: 103.5 MG/DL
EOSINOPHIL # BLD AUTO: 0.2 X10E3/UL (ref 0–0.4)
EOSINOPHIL NFR BLD AUTO: 5 %
ERYTHROCYTE [DISTWIDTH] IN BLOOD BY AUTOMATED COUNT: 16.1 % (ref 11.7–15.4)
EST. AVERAGE GLUCOSE BLD GHB EST-MCNC: 140 MG/DL
GLOBULIN SER CALC-MCNC: 2.5 G/DL (ref 1.5–4.5)
GLUCOSE SERPL-MCNC: 115 MG/DL (ref 65–99)
HBA1C MFR BLD: 6.5 % (ref 4.8–5.6)
HCT VFR BLD AUTO: 39.7 % (ref 34–46.6)
HCV AB S/CO SERPL IA: <0.1 S/CO RATIO (ref 0–0.9)
HDLC SERPL-MCNC: 60 MG/DL
HGB BLD-MCNC: 12.7 G/DL (ref 11.1–15.9)
IMM GRANULOCYTES # BLD AUTO: 0 X10E3/UL (ref 0–0.1)
IMM GRANULOCYTES NFR BLD AUTO: 0 %
LDLC SERPL CALC-MCNC: 92 MG/DL (ref 0–99)
LYMPHOCYTES # BLD AUTO: 1.7 X10E3/UL (ref 0.7–3.1)
LYMPHOCYTES NFR BLD AUTO: 42 %
MCH RBC QN AUTO: 25.2 PG (ref 26.6–33)
MCHC RBC AUTO-ENTMCNC: 32 G/DL (ref 31.5–35.7)
MCV RBC AUTO: 79 FL (ref 79–97)
MICROALBUMIN UR-MCNC: 24.7 UG/ML
MONOCYTES # BLD AUTO: 0.3 X10E3/UL (ref 0.1–0.9)
MONOCYTES NFR BLD AUTO: 9 %
NEUTROPHILS # BLD AUTO: 1.8 X10E3/UL (ref 1.4–7)
NEUTROPHILS NFR BLD AUTO: 43 %
PLATELET # BLD AUTO: 290 X10E3/UL (ref 150–450)
POTASSIUM SERPL-SCNC: 4.3 MMOL/L (ref 3.5–5.2)
PROT SERPL-MCNC: 7.5 G/DL (ref 6–8.5)
RBC # BLD AUTO: 5.03 X10E6/UL (ref 3.77–5.28)
SODIUM SERPL-SCNC: 144 MMOL/L (ref 134–144)
TRIGL SERPL-MCNC: 75 MG/DL (ref 0–149)
VLDLC SERPL CALC-MCNC: 14 MG/DL (ref 5–40)
WBC # BLD AUTO: 4 X10E3/UL (ref 3.4–10.8)

## 2020-10-22 ENCOUNTER — HOSPITAL ENCOUNTER (OUTPATIENT)
Dept: BONE DENSITY | Age: 65
Discharge: HOME | End: 2020-10-22
Attending: FAMILY MEDICINE
Payer: MEDICARE

## 2020-10-22 DIAGNOSIS — Z00.00 WELCOME TO MEDICARE PREVENTIVE VISIT: ICD-10-CM

## 2020-10-22 DIAGNOSIS — Z78.0 POSTMENOPAUSAL STATE: ICD-10-CM

## 2020-10-22 PROCEDURE — 77080 DXA BONE DENSITY AXIAL: CPT

## 2020-10-28 ENCOUNTER — TELEPHONE (OUTPATIENT)
Dept: INTERNAL MEDICINE CLINIC | Age: 65
End: 2020-10-28

## 2020-10-28 NOTE — PROGRESS NOTES
2 pt identifier verified, pt informed per Dr Urbano:Please inform the patient that her bone density is normal.

## 2020-11-03 NOTE — TELEPHONE ENCOUNTER
----- Message from [place] sent at 11/3/2020  1:14 PM EST -----  Regarding: Dr. Catracho Olsen Message/Vendor Calls    Caller's first and last name: Hali Shaw      Reason for call: Requesting a call back to discuss 10/20/20 lab results.        Callback required yes/no and why: yes      Best contact number(s):[phone]      Message from Treva Davis

## 2020-11-04 NOTE — PROGRESS NOTES
CMP-Normal electrolyte levels except for a mild elevation in the glucose and calcium level. You have normal renal  function. This test result also reveals a slight elevation in one your liver enzymes.   CBC- No anemia  [id number] at 6.5  All other results are normal.

## 2020-11-04 NOTE — TELEPHONE ENCOUNTER
Called, spoke with Pt. Two pt identifiers confirmed. Pt given lab results per Dr. Darlin Lechuga.   Pt verbalized understanding of information discussed w/ no further questions at this time.

## 2020-11-24 ENCOUNTER — HOSPITAL ENCOUNTER (OUTPATIENT)
Dept: MAMMOGRAPHY | Age: 65
Discharge: HOME | End: 2020-11-24
Attending: FAMILY MEDICINE
Payer: MEDICARE

## 2020-11-24 DIAGNOSIS — Z12.31 VISIT FOR SCREENING MAMMOGRAM: ICD-10-CM

## 2020-11-24 PROCEDURE — 77067 SCR MAMMO BI INCL CAD: CPT

## 2020-11-24 RX ORDER — HYDROCHLOROTHIAZIDE 25 MG/1
TABLET ORAL
Qty: 90 TAB | Refills: 2 | Status: SHIPPED | OUTPATIENT
Start: 2020-11-24 | End: 2021-08-14

## 2020-11-27 NOTE — PROGRESS NOTES
2 pt identifiers verified- pt informed per Dr Cross Blazing: Negative. No mammographic evidence of malignancy.

## 2020-11-28 DIAGNOSIS — E11.9 TYPE 2 DIABETES MELLITUS WITHOUT COMPLICATION, WITHOUT LONG-TERM CURRENT USE OF INSULIN (HCC): ICD-10-CM

## 2020-11-29 RX ORDER — BLOOD SUGAR DIAGNOSTIC
STRIP MISCELLANEOUS
Qty: 100 STRIP | Refills: 11 | Status: SHIPPED | OUTPATIENT
Start: 2020-11-29 | End: 2022-09-21 | Stop reason: SDUPTHER

## 2021-02-11 DIAGNOSIS — E08.00 DIABETES MELLITUS DUE TO UNDERLYING CONDITION WITH HYPEROSMOLARITY WITHOUT COMA, WITHOUT LONG-TERM CURRENT USE OF INSULIN (HCC): ICD-10-CM

## 2021-02-11 RX ORDER — METFORMIN HYDROCHLORIDE 500 MG/1
TABLET ORAL
Qty: 90 TAB | Refills: 1 | Status: SHIPPED | OUTPATIENT
Start: 2021-02-11 | End: 2021-08-08

## 2021-03-12 DIAGNOSIS — I10 ESSENTIAL HYPERTENSION: ICD-10-CM

## 2021-03-14 RX ORDER — METOPROLOL TARTRATE 25 MG/1
TABLET, FILM COATED ORAL
Qty: 180 TAB | Refills: 1 | Status: SHIPPED | OUTPATIENT
Start: 2021-03-14 | End: 2021-09-16

## 2021-03-26 DIAGNOSIS — E78.2 MIXED HYPERLIPIDEMIA: ICD-10-CM

## 2021-03-26 RX ORDER — ROSUVASTATIN CALCIUM 10 MG/1
TABLET, COATED ORAL
Qty: 30 TAB | Refills: 11 | Status: SHIPPED | OUTPATIENT
Start: 2021-03-26 | End: 2021-06-07 | Stop reason: SDUPTHER

## 2021-06-07 ENCOUNTER — OFFICE VISIT (OUTPATIENT)
Dept: INTERNAL MEDICINE CLINIC | Age: 66
End: 2021-06-07
Payer: MEDICARE

## 2021-06-07 VITALS
OXYGEN SATURATION: 98 % | BODY MASS INDEX: 29.59 KG/M2 | TEMPERATURE: 97.5 F | WEIGHT: 177.6 LBS | HEIGHT: 65 IN | SYSTOLIC BLOOD PRESSURE: 132 MMHG | RESPIRATION RATE: 14 BRPM | HEART RATE: 63 BPM | DIASTOLIC BLOOD PRESSURE: 78 MMHG

## 2021-06-07 DIAGNOSIS — I10 ESSENTIAL HYPERTENSION: ICD-10-CM

## 2021-06-07 DIAGNOSIS — E78.2 MIXED HYPERLIPIDEMIA: ICD-10-CM

## 2021-06-07 DIAGNOSIS — E11.9 TYPE 2 DIABETES MELLITUS WITHOUT COMPLICATION, WITHOUT LONG-TERM CURRENT USE OF INSULIN (HCC): Primary | ICD-10-CM

## 2021-06-07 PROCEDURE — G8752 SYS BP LESS 140: HCPCS | Performed by: FAMILY MEDICINE

## 2021-06-07 PROCEDURE — 99214 OFFICE O/P EST MOD 30 MIN: CPT | Performed by: FAMILY MEDICINE

## 2021-06-07 PROCEDURE — 1090F PRES/ABSN URINE INCON ASSESS: CPT | Performed by: FAMILY MEDICINE

## 2021-06-07 PROCEDURE — 1101F PT FALLS ASSESS-DOCD LE1/YR: CPT | Performed by: FAMILY MEDICINE

## 2021-06-07 PROCEDURE — G0463 HOSPITAL OUTPT CLINIC VISIT: HCPCS | Performed by: FAMILY MEDICINE

## 2021-06-07 PROCEDURE — G8754 DIAS BP LESS 90: HCPCS | Performed by: FAMILY MEDICINE

## 2021-06-07 PROCEDURE — G8427 DOCREV CUR MEDS BY ELIG CLIN: HCPCS | Performed by: FAMILY MEDICINE

## 2021-06-07 PROCEDURE — G8417 CALC BMI ABV UP PARAM F/U: HCPCS | Performed by: FAMILY MEDICINE

## 2021-06-07 PROCEDURE — 3017F COLORECTAL CA SCREEN DOC REV: CPT | Performed by: FAMILY MEDICINE

## 2021-06-07 PROCEDURE — G8510 SCR DEP NEG, NO PLAN REQD: HCPCS | Performed by: FAMILY MEDICINE

## 2021-06-07 PROCEDURE — 2022F DILAT RTA XM EVC RTNOPTHY: CPT | Performed by: FAMILY MEDICINE

## 2021-06-07 PROCEDURE — 3044F HG A1C LEVEL LT 7.0%: CPT | Performed by: FAMILY MEDICINE

## 2021-06-07 PROCEDURE — G8536 NO DOC ELDER MAL SCRN: HCPCS | Performed by: FAMILY MEDICINE

## 2021-06-07 PROCEDURE — G9899 SCRN MAM PERF RSLTS DOC: HCPCS | Performed by: FAMILY MEDICINE

## 2021-06-07 PROCEDURE — G8399 PT W/DXA RESULTS DOCUMENT: HCPCS | Performed by: FAMILY MEDICINE

## 2021-06-07 RX ORDER — LISINOPRIL 40 MG/1
TABLET ORAL
Qty: 90 TABLET | Refills: 2 | Status: SHIPPED | OUTPATIENT
Start: 2021-06-07 | End: 2022-03-13

## 2021-06-07 RX ORDER — ROSUVASTATIN CALCIUM 10 MG/1
TABLET, COATED ORAL
Qty: 90 TABLET | Refills: 2 | Status: SHIPPED | OUTPATIENT
Start: 2021-06-07 | End: 2021-06-23

## 2021-06-07 NOTE — PROGRESS NOTES
Reviewed record in preparation for visit and have obtained necessary documentation. Identified pt with two pt identifiers(name and ). Chief Complaint   Patient presents with    Physical     check up        Health Maintenance Due   Topic Date Due    Diabetic Foot Care  Never done    Shingles Vaccine (1 of 2) Never done       Ms. Tanya Santiago has a reminder for a \"due or due soon\" health maintenance. I have asked that she discuss this further with her primary care provider for follow-up on this health maintenance. Coordination of Care Questionnaire:  :     1) Have you been to an emergency room, urgent care clinic since your last visit? no   Hospitalized since your last visit? no             2) Have you seen or consulted any other health care providers outside of [address] [place] [address] since your last visit? no  (Include any pap smears or colon screenings in this section.)    3) In the event something were to happen to you and you were unable to speak on your behalf, do you have an Advance Directive/ Living Will in place stating your wishes?  NO

## 2021-06-07 NOTE — PROGRESS NOTES
SUBJECTIVE:   Karena Grey. Luca Nicole is a 77 y.o. female who is here for a follow-up. HTN: Pt reports her BP this morning was 124/76. She notes exercising 4 days a week walking 45 minutes a day. Pt reports having spots on her arm that seem to get better over time but have been present for a little. Pt specifically denies changes in vision or hearing, CP, SOB, heartburn or upset stomach, change in bowel habits, or problems urinating. PREVENTIVE:  Colonoscopy: UTD   Mammogram: UTD   Tdap: UTD    COVID-19: Completed ([facility] series 04/2021-05/2021)    At this time, she is otherwise doing well and has brought no other complaints to my attention today. For a list of the medical issues addressed today, see the assessment and plan below. PMH:   Past Medical History:   Diagnosis Date    Diabetes ([facility] [address]     Hypercholesterolemia     Hypertension     Other ill-defined conditions(799.89)     high cholesterol       PSH:  has a past surgical history that includes hx tubal ligation; hx hysterectomy (3/25/99); hx orthopaedic; and hx rotator cuff repair (10/2017). Allergies: is allergic to percocet [oxycodone-acetaminophen]. Meds:   Current Outpatient Medications   Medication Sig    lisinopriL (PRINIVIL, ZESTRIL) 40 mg tablet TAKE 1 TABLET BY MOUTH EVERY DAY    rosuvastatin (CRESTOR) 10 mg tablet TAKE 1 TABLET BY MOUTH EVERY EVENING    metoprolol tartrate (LOPRESSOR) 25 mg tablet TAKE 1 TABLET BY MOUTH TWICE DAILY    metFORMIN (GLUCOPHAGE) 500 mg tablet TAKE 1 TABLET BY MOUTH DAILY    amLODIPine (NORVASC) 5 mg tablet TAKE 1 TABLET BY MOUTH DAILY    glucose blood VI test strips (OneTouch Verio test strips) strip USE AS DIRECTED TWICE DAILY    hydroCHLOROthiazide (HYDRODIURIL) 25 mg tablet TAKE 1 TABLET BY MOUTH DAILY    Blood-Glucose Meter monitoring kit Check glucose daily.     metFORMIN (GLUCOPHAGE) 500 mg tablet TAKE 1 TABLET BY MOUTH DAILY    mv-min/iron/folic/calcium/vitK (WOMEN'S MULTIVITAMIN PO) Take  by mouth.  fish oil-omega-3 fatty acids 340-1,000 mg capsule Take 1 Cap by mouth daily.  Blood-Glucose Meter misc One Touch Ultra. Use as directed to check blood sugars twice daily    hydrochlorothiazide (HYDRODIURIL) 25 mg tablet TAKE ONE TABLET BY MOUTH DAILY    omega-3 fatty acids-vitamin e (FISH OIL) 1,000 mg cap Take 1 Cap by mouth.  ergocalciferol (VITAMIN D2) 50,000 unit capsule Take 1,000 Units by mouth. (Patient not taking: Reported on 6/7/2021)     No current facility-administered medications for this visit. Fam hx: family history includes Breast Cancer in her paternal aunt; Cancer in her father; Cancer (age of onset: 64) in her mother; Diabetes in her sister; Hypertension in her brother, brother, father, mother, sister, sister, and sister. Soc hx:  reports that she quit smoking about 31 years ago. She quit after 15.00 years of use. She has never used smokeless tobacco. She reports current alcohol use. She reports that she does not use drugs. Review of Systems - History obtained from the patient  General ROS: negative  Psychological ROS: negative  Ophthalmic ROS: negative  ENT ROS: negative  Respiratory ROS: no cough, shortness of breath, or wheezing  Cardiovascular ROS: no chest pain or dyspnea on exertion  Gastrointestinal ROS: no abdominal pain, change in bowel habits, or black or bloody stools  Genito-Urinary ROS: negative  Musculoskeletal ROS: negative  Neurological ROS: negative  Dermatological ROS: negative    OBJECTIVE:   Vitals:   Visit Vitals  BP (!) 143/90 (BP 1 Location: Right arm, BP Patient Position: Sitting, BP Cuff Size: Adult)   Pulse 63   Temp 97.5 °F (36.4 °C) (Temporal)   Resp 14   Ht 5' 5\" (1.651 m)   Wt 177 lb 9.6 oz (80.6 kg)   LMP 03/25/1999   SpO2 98%   BMI 29.55 kg/m²     Gen: Pleasant 77 y.o. female in NAD. HEART: RRR, No M/G/R.     LUNGS: CTAB No W/R. ABDOMEN: S, NT, ND, BS+. MUSCULOSKELETAL: Normal ROM, muscle strength 5/5 all groups. NEURO: Alert and oriented x 3. Cranial nerves grossly intact. No focal sensory or motor deficits noted. ASSESSMENT/ PLAN:     Diagnoses and all orders for this visit:    1. Type 2 diabetes mellitus without complication, without long-term current use of insulin (HCC)  -     METABOLIC PANEL, COMPREHENSIVE; Future  -     HEMOGLOBIN A1C WITH EAG; Future    2. Essential hypertension  -     METABOLIC PANEL, COMPREHENSIVE; Future    3. Mixed hyperlipidemia  -     rosuvastatin (CRESTOR) 10 mg tablet; TAKE 1 TABLET BY MOUTH EVERY EVENING    Other orders  -     lisinopriL (PRINIVIL, ZESTRIL) 40 mg tablet; TAKE 1 TABLET BY MOUTH EVERY DAY      1. Type DM type 2  I advised pt to continue current dose of Metformin 500 mg tablet , avoid sugars and starches, and to increase exercise when possible. Recheck hemoglobin A1c and CMP. 2. Essential Hypertension  BP seems to be well controlled. I recommended continuing current dose of Lisinopril 40 mg tablet daily, Amlodipine 5 mg tablet daily, HCTZ 25 mg tablet daily, eating a low sodium diet, and increasing exercise. Recheck CMP. 3. Mixed Hyperlipidemia   Lipid panel shows cholesterol seems to be well controlled. I recommended continuing current dose of Crestor 10 mg tablet, eating a low fat diet, and increasing exercise. Recheck lipid panel. I am pleased with her weight loss progress. I reviewed the pt's previous lab work. Follow-up and Dispositions    · Return in about 6 months (around 12/7/2021) for follow up. I have reviewed the patient's medications and risks/side effects/benefits were discussed. Diagnosis(-es) explained to patient and questions answered. Literature provided where appropriate.      Written by Mela Peres, as dictated by Douglas Vazquez MD.

## 2021-06-08 LAB
ALBUMIN SERPL-MCNC: 4.5 G/DL (ref 3.5–5)
ALBUMIN/GLOB SERPL: 1.4 {RATIO} (ref 1.1–2.2)
ALP SERPL-CCNC: 84 U/L (ref 45–117)
ALT SERPL-CCNC: 35 U/L (ref 12–78)
ANION GAP SERPL CALC-SCNC: 4 MMOL/L (ref 5–15)
AST SERPL-CCNC: 20 U/L (ref 15–37)
BILIRUB SERPL-MCNC: 0.3 MG/DL (ref 0.2–1)
BUN SERPL-MCNC: 11 MG/DL (ref 6–20)
BUN/CREAT SERPL: 18 (ref 12–20)
CALCIUM SERPL-MCNC: 9.9 MG/DL (ref 8.5–10.1)
CHLORIDE SERPL-SCNC: 106 MMOL/L (ref 97–108)
CO2 SERPL-SCNC: 32 MMOL/L (ref 21–32)
CREAT SERPL-MCNC: 0.62 MG/DL (ref 0.55–1.02)
EST. AVERAGE GLUCOSE BLD GHB EST-MCNC: 126 MG/DL
GLOBULIN SER CALC-MCNC: 3.2 G/DL (ref 2–4)
GLUCOSE SERPL-MCNC: 80 MG/DL (ref 65–100)
HBA1C MFR BLD: 6 % (ref 4–5.6)
POTASSIUM SERPL-SCNC: 4.1 MMOL/L (ref 3.5–5.1)
PROT SERPL-MCNC: 7.7 G/DL (ref 6.4–8.2)
SODIUM SERPL-SCNC: 142 MMOL/L (ref 136–145)

## 2021-06-10 DIAGNOSIS — I10 ESSENTIAL HYPERTENSION: ICD-10-CM

## 2021-06-11 RX ORDER — AMLODIPINE BESYLATE 5 MG/1
TABLET ORAL
Qty: 30 TABLET | Refills: 5 | Status: SHIPPED | OUTPATIENT
Start: 2021-06-11 | End: 2021-12-08 | Stop reason: SDUPTHER

## 2021-06-15 NOTE — PROGRESS NOTES
A1c: Your current hgbA1c of 6.0 is lower than your last level of 6.5. Keep up the good work! Continue to work on following a diabetic diet and exercise. Recheck this test: hgbA1c  in  3 months. Continue with current  medications. CMP:Normal electrolyte levels, renal, and liver function.

## 2021-06-23 DIAGNOSIS — E78.2 MIXED HYPERLIPIDEMIA: ICD-10-CM

## 2021-06-23 RX ORDER — ROSUVASTATIN CALCIUM 10 MG/1
TABLET, COATED ORAL
Qty: 90 TABLET | Refills: 2 | Status: SHIPPED | OUTPATIENT
Start: 2021-06-23 | End: 2022-07-11

## 2021-08-08 DIAGNOSIS — E08.00 DIABETES MELLITUS DUE TO UNDERLYING CONDITION WITH HYPEROSMOLARITY WITHOUT COMA, WITHOUT LONG-TERM CURRENT USE OF INSULIN (HCC): ICD-10-CM

## 2021-08-08 RX ORDER — METFORMIN HYDROCHLORIDE 500 MG/1
TABLET ORAL
Qty: 90 TABLET | Refills: 1 | Status: SHIPPED | OUTPATIENT
Start: 2021-08-08 | End: 2021-12-08 | Stop reason: SDUPTHER

## 2021-08-14 RX ORDER — HYDROCHLOROTHIAZIDE 25 MG/1
TABLET ORAL
Qty: 90 TABLET | Refills: 2 | Status: SHIPPED | OUTPATIENT
Start: 2021-08-14 | End: 2021-12-08 | Stop reason: SDUPTHER

## 2021-09-15 DIAGNOSIS — I10 ESSENTIAL HYPERTENSION: ICD-10-CM

## 2021-09-16 RX ORDER — METOPROLOL TARTRATE 25 MG/1
TABLET, FILM COATED ORAL
Qty: 180 TABLET | Refills: 1 | Status: SHIPPED | OUTPATIENT
Start: 2021-09-16 | End: 2022-03-13

## 2021-09-20 ENCOUNTER — TELEPHONE (OUTPATIENT)
Dept: INTERNAL MEDICINE CLINIC | Age: 66
End: 2021-09-20

## 2021-09-20 DIAGNOSIS — I16.0 HYPERTENSIVE URGENCY: Primary | ICD-10-CM

## 2021-09-20 NOTE — TELEPHONE ENCOUNTER
States BP Increasing daily, would like to discuss with nurse or doctor. Started monitoring sep 15. Today it was 159/103, pt took meter to pharmacist in case not working, pharmacist retook and got 189/100 pulse 80. States  feels strain on left side of chest (could be gas or from recent exercise, pt unsure), no headaches or nausea.       [phone]

## 2021-09-21 ENCOUNTER — CLINICAL SUPPORT (OUTPATIENT)
Dept: INTERNAL MEDICINE CLINIC | Age: 66
End: 2021-09-21
Payer: MEDICARE

## 2021-09-21 VITALS
SYSTOLIC BLOOD PRESSURE: 124 MMHG | RESPIRATION RATE: 16 BRPM | OXYGEN SATURATION: 98 % | TEMPERATURE: 98.1 F | HEART RATE: 78 BPM | DIASTOLIC BLOOD PRESSURE: 71 MMHG

## 2021-09-21 DIAGNOSIS — I10 ESSENTIAL HYPERTENSION: Primary | ICD-10-CM

## 2021-09-21 PROCEDURE — G0463 HOSPITAL OUTPT CLINIC VISIT: HCPCS | Performed by: FAMILY MEDICINE

## 2021-09-21 PROCEDURE — 93010 ELECTROCARDIOGRAM REPORT: CPT | Performed by: FAMILY MEDICINE

## 2021-09-21 PROCEDURE — 93005 ELECTROCARDIOGRAM TRACING: CPT | Performed by: FAMILY MEDICINE

## 2021-09-21 NOTE — TELEPHONE ENCOUNTER
OT IRP Initial Evaluation               ASSESSMENT:   The patient presents to the Inpatient Rehabilitation program for OT s/p mechanical fall resulting in fracture at the base of the odontoid and posterior lamina and spinous process of C2.  Patient is to wear a cervical collar at all times  The patient presents with impairments in bathing, dressing, functional mobility, and activity tolerance, and the patient is currently functioning at steadying assist for mobility and min-mod assist for all self cares.  Primary barrier impeding independence with dressing tasks at this time is decreased visual field due to presence of cervical collar and decreased activity tolerance.   The patient needs to be at a modified independent level for safe return to prior living situation.  The patient will benefit from continued skilled OT to address these deficits with the discharge goals of returning home with .  The prognosis for goal achievement is good.    The patient was educated on the role of OT in IRP and the IRP handbook.  The plan of care and goals were discussed and they verbalize understanding and agreement.      Co-morbidities:   Patient Active Problem List   Diagnosis   • Unspecified hypothyroidism   • Osteoporosis, unspecified   • Generalized osteoarthrosis, involving multiple sites   • Erosive osteoarthritis   • S/P total hip arthroplasty   • Change in bowel function   • SOB (shortness of breath)   • Generalized abdominal pain   • Primary insomnia   • Anxiety   • Numbness and tingling in left hand   • Paresthesia of both hands   • Diplopia   • Delirium   • Left elbow pain   • Closed fracture of left olecranon process   • Closed fracture of ulna, olecranon process   • Acute right-sided low back pain with right-sided sciatica   • Anemia   • Weight loss   • Acute bacterial sinusitis   • Closed odontoid fracture (CMS/HCC)   • Dizziness       Task Modification: clinical decision making of low complexity, no task  This writer contacted patient in reference to BP. Two patient identifiers confirmed. Patient informed of the following per Dr. Margaret Caceres    Please have patient come in today for nurse visit. Levi Almazan can get an EKG and recheck her blood pressure.  If the blood pressure remains elevated in the same range she will need to go to the ER. Patient verbalized understanding. Nurse visit scheduled for today 09/21/2021 at 14:30 for BP Check and EKG. modification      SUBJECTIVE: Subjective: \"I'll take a shower.\" (07/29/17 0910)    OBJECTIVE:  Precautions  Neck Brace: At all times (07/29/17 0910)  Cervical Precautions: Yes (07/29/17 0910)  Other Precautions: fall risk (07/29/17 0910)  Precautions Comments: Cervical collar at all times (07/29/17 0910)    See below for current functional status overview.  See OT flowsheet for full details regarding the OT therapy provided.      OT Identified Barriers to Discharge: weakness, safety concerns     EDUCATION:   On this date, the patient was educated on implementing cervical precautions into self cares.    The response to education was: Needs reinforcement    PLAN:   Continue skilled OT, including the following Treatment Interventions: ADL retraining;Functional transfer training;UE strengthening/ROM;Endurance training;Cognitive reorientation;Patient/Family training;Fine motor coordination activities;Compensatory technique education;Equipment eval/education;Neuro muscular reeducation (07/29/17 0910)   OT Frequency: 1-2 sessions (07/29/17 0910), Frequency Comments: BID LOS (07/29/17 0910)    Treatment Plan for Next Session: SLUMS          GOALS:  Short Term Goals to Be Reviewed On: 08/04/17  Goal Agreement: Patient agrees with goals and treatment plan              Grooming Discharge Goal 1: Modified independent in stance at sink.        Bathing Discharge Goal 1: Modified independent with showering seated on shower chair.                 Dressing Discharge Goal 1: Modified independent with lower body dressing        Dressing Discharge Goal 2: Modified independent with upper body dressing.              Home Setting Transfer Discharge Goal 1: Modified independent with toilet transfers        Home Management Discharge Goal 1: maintain cervical precautions 100% of time w/ clothing retreival and transport                 RECOMMENDATIONS FOR DISCHARGE:  Recommendations for Discharge: OT: Home, Home therapy    PT/OT Mobility  Equipment for Discharge: has 2ww and cane (07/29/17 0910)  PT/OT ADL Equipment for Discharge: issued a reacher, long bath sponge, long shoe horn and sockaide (07/29/17 0910)      FUNCTIONAL DATA OVERVIEW LAST 24 HOURS  ADLs   Self Cares/ADL's  Grooming Assistance: Touching/Steadying Assistance (07/29/17 0910)  Oral Hygiene Assistance: Touching/Steadying Assistance (07/29/17 0910)  Grooming/Oral Hygiene Deficit: Supervision/Safety;Steadying;Verbal cueing;Increased time to complete (07/29/17 0910)  Bathing Assistance: Minimal Assist (Min) (07/29/17 0910)  Bathing Deficit: Increased time to complete;Steadying;Verbal cueing;Supervision/Safety (07/29/17 0910)  Upper Body Dressing Assistance: Moderate Assist (Mod) (07/29/17 0910)  Upper Body Dressing Deficit: Thread RUE;Thread LUE;Pull over head (07/29/17 0910)  Lower Body Clothing Assistance: Minimal Assist (Min) (07/29/17 0910)  Footwear Assistance: Maximal Assist (Max) (07/29/17 0910)  Lower Body Dressing Deficit: Thread LLE into pants;Thread LLE into underwear (07/29/17 0910)  Self Cares/ADL's Comments #1: Level of assist for self cares mainly attributable to limited field of vision due to cervical collar.  Steadying assist due to fatigue, but does have strength and motor skills to complete self cares.  Education on compensatory methods is anticipated to increase independence.  Patient does report previously using AE following previous surgeries. (07/29/17 0910)    Household mobility  Household Mobility  Sit to Stand: Touching/Steadying Assistance (07/29/17 0910)  Stand to Sit: Touching/Steadying Assistance (07/29/17 0910)  Shower Transfers: Touching/Steadying Assistance (walk in shower) (07/29/17 0910)  Transfer Equipment: gait belt, 2ww (07/29/17 0910)  Sitting - Static: Supervision (07/29/17 0910)  Sitting - Dynamic: Touching/Steadying Assistance (07/29/17 0910)  Standing - Static: Touching/Steadying Assistance (07/29/17 0910)  Standing - Dynamic:  Touching/Steadying Assistance (07/29/17 0910)  Base of Support: Hip Width (07/29/17 0910)  Household Mobility Comments #1: Patient ambulated ~30 feet from room to shower room with steadying assist.  All functional transfers steadying assist to ensure safety.  All bed mobility supervision with cueing for safety and technique.   (07/29/17 0910)    Home Management       Tolerance  OT Activity Tolerance  Activity Tolerance: 1:1 Activity to rest (07/29/17 0910)  Activity Tolerance Comments: At end of session, after laying down in bed, patient reported feeling dizzy.  BLANK Knight came to assess.   (07/29/17 0910)    Cognition  Communication/Cognition  Communication: Clear speech (07/29/17 0910)  Overall Cognitive Status: Within Functional Limits (07/29/17 0910)  Memory: Appears intact (07/29/17 0910)

## 2021-09-21 NOTE — TELEPHONE ENCOUNTER
Please have patient come in today for nurse visit. She can get an EKG and recheck her blood pressure. If the blood pressure remains elevated in the same range she will need to go to the ER.

## 2021-09-21 NOTE — TELEPHONE ENCOUNTER
Patient calling again and asking to be worked into schedule due to bp, states she \"is a nervous wreck\" about it and just wants to be checked. States unable to do VV, vv on thurs was declined. States doesn't mind waiting all day if can be worked in for a few min.       [phone]

## 2021-09-29 ENCOUNTER — OFFICE VISIT (OUTPATIENT)
Dept: OBGYN CLINIC | Age: 66
End: 2021-09-29
Payer: MEDICARE

## 2021-09-29 VITALS — BODY MASS INDEX: 30.45 KG/M2 | DIASTOLIC BLOOD PRESSURE: 84 MMHG | SYSTOLIC BLOOD PRESSURE: 142 MMHG | WEIGHT: 183 LBS

## 2021-09-29 DIAGNOSIS — Z01.419 ENCOUNTER FOR GYNECOLOGICAL EXAMINATION WITHOUT ABNORMAL FINDING: Primary | ICD-10-CM

## 2021-09-29 PROCEDURE — G0101 CA SCREEN;PELVIC/BREAST EXAM: HCPCS | Performed by: OBSTETRICS & GYNECOLOGY

## 2021-09-29 PROCEDURE — G8754 DIAS BP LESS 90: HCPCS | Performed by: OBSTETRICS & GYNECOLOGY

## 2021-09-29 PROCEDURE — G8432 DEP SCR NOT DOC, RNG: HCPCS | Performed by: OBSTETRICS & GYNECOLOGY

## 2021-09-29 PROCEDURE — G8417 CALC BMI ABV UP PARAM F/U: HCPCS | Performed by: OBSTETRICS & GYNECOLOGY

## 2021-09-29 PROCEDURE — 3017F COLORECTAL CA SCREEN DOC REV: CPT | Performed by: OBSTETRICS & GYNECOLOGY

## 2021-09-29 PROCEDURE — G8753 SYS BP > OR = 140: HCPCS | Performed by: OBSTETRICS & GYNECOLOGY

## 2021-09-29 PROCEDURE — G9899 SCRN MAM PERF RSLTS DOC: HCPCS | Performed by: OBSTETRICS & GYNECOLOGY

## 2021-09-29 PROCEDURE — 1090F PRES/ABSN URINE INCON ASSESS: CPT | Performed by: OBSTETRICS & GYNECOLOGY

## 2021-09-29 PROCEDURE — 1101F PT FALLS ASSESS-DOCD LE1/YR: CPT | Performed by: OBSTETRICS & GYNECOLOGY

## 2021-09-29 NOTE — PROGRESS NOTES
Annual exam    Mathew Paredes is a 77 y.o. presenting for annual exam.   She is well today    Ob/Gyn Hx:    Patient's last menstrual period was 1999. Menopause- age  ? VMS-yes2? Vag dryness-yes  ? HRT-none  STI- declined  ? SA-not currently. Health maintenance:  Pap-2017- normal  Mammo-10/2020- negative- info given to schedule one this year. Colonoscopy- due in four years  Covid vaccine- series completed. Past Medical History:   Diagnosis Date    Diabetes ([address]     Hypercholesterolemia     Hypertension     Other ill-defined conditions(799.89)     high cholesterol       Past Surgical History:   Procedure Laterality Date    HX HYSTERECTOMY  3/25/99    [facility]N    HX ORTHOPAEDIC      left foot surgery    HX ROTATOR CUFF REPAIR  10/2017    HX TUBAL LIGATION         Family History   Problem Relation Age of Onset    Hypertension Mother     Cancer Mother 64        Stomach cancer    Hypertension Father     Cancer Father         Stomach cancer    Hypertension Sister     Diabetes Sister     Hypertension Brother     Hypertension Sister     Hypertension Sister     Hypertension Brother     Breast Cancer Paternal Aunt         over 48       Social History     Socioeconomic History    Marital status:      Spouse name: Not on file    Number of children: Not on file    Years of education: Not on file    Highest education level: Not on file   Occupational History    Not on file   Tobacco Use    Smoking status: Former Smoker     Years: 15.00     Quit date: 1990     Years since quittin.5    Smokeless tobacco: Never Used   Substance and Sexual Activity    Alcohol use:  Yes    Drug use: Never    Sexual activity: Not Currently     Partners: Male     Birth control/protection: Surgical   Other Topics Concern    Not on file   Social History Narrative    Not on file     Social Determinants of Health     Financial Resource Strain:     Difficulty of Paying Living Expenses:    Food Insecurity:     Worried About Running Out of Food in the Last Year:     [address] in the Last Year:    Transportation Needs:     Lack of Transportation (Medical):  Lack of Transportation (Non-Medical):    Physical Activity:     Days of Exercise per Week:     Minutes of Exercise per Session:    Stress:     Feeling of Stress :    Social Connections:     Frequency of Communication with Friends and Family:     Frequency of Social Gatherings with Friends and Family:     Attends Congregational Services:     Active Member of Clubs or Organizations:     Attends Club or Organization Meetings:     Marital Status:    Intimate Partner Violence:     Fear of Current or Ex-Partner:     Emotionally Abused:     Physically Abused:     Sexually Abused:        Current Outpatient Medications   Medication Sig Dispense Refill    metoprolol tartrate (LOPRESSOR) 25 mg tablet TAKE 1 TABLET BY MOUTH TWICE DAILY 180 Tablet 1    hydroCHLOROthiazide (HYDRODIURIL) 25 mg tablet TAKE 1 TABLET BY MOUTH DAILY 90 Tablet 2    metFORMIN (GLUCOPHAGE) 500 mg tablet TAKE 1 TABLET BY MOUTH DAILY 90 Tablet 1    rosuvastatin (CRESTOR) 10 mg tablet TAKE 1 TABLET BY MOUTH EVERY EVENING 90 Tablet 2    amLODIPine (NORVASC) 5 mg tablet TAKE 1 TABLET BY MOUTH DAILY 30 Tablet 5    lisinopriL (PRINIVIL, ZESTRIL) 40 mg tablet TAKE 1 TABLET BY MOUTH EVERY DAY 90 Tablet 2    glucose blood VI test strips (OneTouch Verio test strips) strip USE AS DIRECTED TWICE DAILY 100 Strip 11    Blood-Glucose Meter monitoring kit Check glucose daily. 1 Kit 0    metFORMIN (GLUCOPHAGE) 500 mg tablet TAKE 1 TABLET BY MOUTH DAILY 90 Tab 1    mv-min/iron/folic/calcium/vitK (WOMEN'S MULTIVITAMIN PO) Take  by mouth.  Blood-Glucose Meter misc One Touch Ultra.  Use as directed to check blood sugars twice daily 1 Each 0    hydrochlorothiazide (HYDRODIURIL) 25 mg tablet TAKE ONE TABLET BY MOUTH DAILY 30 Tab 6    ergocalciferol (VITAMIN D2) 50,000 unit capsule Take 1,000 Units by mouth. (Patient not taking: Reported on 6/7/2021)      omega-3 fatty acids-vitamin e (FISH OIL) 1,000 mg cap Take 1 Cap by mouth.            Allergies   Allergen Reactions    Percocet [Oxycodone-Acetaminophen] Hives       Physical Exam    Visit Vitals  LMP 03/25/1999       Constitutional  · Appearance: well-nourished, well developed, alert, in no acute distress    HENT  · Head and Face: appears normal    Neck  · Inspection/Palpation: normal appearance, no masses or tenderness  · Lymph Nodes: no lymphadenopathy present  · Thyroid: gland size normal, nontender, no nodules or masses present on palpation    Chest  · Respiratory Effort: non-labored breathing  · Auscultation: CTAB, normal breath sounds    Cardiovascular  · Heart:  · Auscultation: regular rate and rhythm without murmur  · Extremities: no peripheral edema    Breasts  · Inspection of Breasts: breasts symmetrical, no skin changes, no discharge present, nipple appearance normal, no skin retraction present  · Palpation of Breasts and Axillae: no masses present on palpation, no breast tenderness  · Axillary Lymph Nodes: no lymphadenopathy present    Gastrointestinal  · Abdominal Examination: abdomen non-tender to palpation, normal bowel sounds, no masses present  · Liver and spleen: no hepatomegaly present, spleen not palpable  · Hernias: no hernias identified    Genitourinary  · External Genitalia: normal appearance for age, no discharge present, no tenderness present, no inflammatory lesions present, no masses present, +atrophy of UG mucosa  · Vagina: normal vaginal vault without central or paravaginal defects, no discharge present, no inflammatory lesions present, no masses present  · Bladder: non-tender to palpation  · Urethra: appears normal  · Cervix: normal   · Perineum: perineum within normal limits, no evidence of trauma, no rashes or skin lesions present    Skin  · General Inspection: no rash, no lesions identified    Neurologic/Psychiatric  · Mental Status:  · Orientation: grossly oriented to person, place and time  · Mood and Affect: mood normal, affect appropriate      Assessment/Plan:  77 y.o. postmenopausal female overall doing well. Health Maintenance:  -counseled re: diet, exercise, healthy lifestyle  -refer for mammo- info given.   -refer for colonoscopy- UTD  -refer for dexa scan  Discussed covid vaccine    RTC: 1 year for annual wellness assessment, or sooner prn for problems or concerns  -handouts and instructions provided    Reina Timmons  9/29/2021  8:28 AM     Signed By: La Gerardo MD     September 29, 2021

## 2021-09-29 NOTE — PATIENT INSTRUCTIONS
Pelvic Exam: Care Instructions  Overview     When your doctor examines your pelvic organs, it's called a pelvic exam. This exam is done to evaluate symptoms, such as pelvic pain or abnormal vaginal bleeding and discharge. It may also be done to collect samples of cells for cervical cancer screening. Before your exam, it's important to share some information with your doctor. You can talk about any concerns you may have. Your doctor will also want to know if you are pregnant or use birth control. And your doctor will want to hear about any problems, surgeries, or procedures you have had in your pelvic area. You will also need to tell your doctor when your last period was. Follow-up care is a key part of your treatment and safety. Be sure to make and go to all appointments, and call your doctor if you are having problems. It's also a good idea to know your test results and keep a list of the medicines you take. How is a pelvic exam done? · During a pelvic exam, you will:  ? Take off your clothes below the waist. You will get a paper or cloth cover to put over the lower half of your body. ? Lie on your back on an exam table with your feet and legs supported by footrests. · The doctor may:  ? Ask you to relax your knees. Your knees need to lean out, toward the walls. ? Check the opening of your vagina for sores or swelling. ? Gently put a tool called a speculum into your vagina. It opens the vagina a little bit. You may feel some pressure. The speculum lets your doctor see inside the vagina. ? Use a small brush, spatula, or swab to get a sample for testing. The doctor then removes the speculum. ? Put on gloves and put one or two fingers of one hand into your vagina. The other hand goes on your lower belly. This lets your doctor feel your pelvic organs. You will probably feel some pressure. ? Put one gloved finger into your rectum and one into your vagina, if needed.  This can also help check your pelvic organs. You may have a small amount of vaginal discharge or bleeding after the exam.  Why is a pelvic exam done? A pelvic exam may be done:  · To collect samples of cells for cervical cancer screening. · To check for vaginal infection. · To check for sexually transmitted infections, such as chlamydia or herpes. · To help find the cause of abnormal uterine bleeding. · To look for problems like uterine fibroids, ovarian cysts, or uterine prolapse. · To help find the cause of pelvic or belly pain. · Before inserting an intrauterine device (IUD). · To collect evidence if you've been sexually assaulted. What are the risks of a pelvic exam?  There is a small chance that the doctor will find something on a pelvic exam that would not have caused a problem. This is called overdiagnosis. It could lead to tests or treatment you don't need. When should you call for help? Watch closely for changes in your health, and be sure to contact your doctor if you have any problems. Where can you learn more? Go to http://www.gray.com/  Enter M421 in the search box to learn more about \"Pelvic Exam: Care Instructions. \"  Current as of: February 11, 2021               Content Version: 13.0  © 5499-8016 [employer]. Care instructions adapted under license by [employer] (which disclaims liability or warranty for this information). If you have questions about a medical condition or this instruction, always ask your healthcare professional. [place] [address] any warranty or liability for your use of this information.

## 2021-10-18 ENCOUNTER — OFFICE VISIT (OUTPATIENT)
Dept: INTERNAL MEDICINE CLINIC | Age: 66
End: 2021-10-18
Payer: MEDICARE

## 2021-10-18 VITALS
TEMPERATURE: 96.8 F | HEIGHT: 65 IN | DIASTOLIC BLOOD PRESSURE: 78 MMHG | BODY MASS INDEX: 30.39 KG/M2 | RESPIRATION RATE: 16 BRPM | OXYGEN SATURATION: 98 % | SYSTOLIC BLOOD PRESSURE: 146 MMHG | WEIGHT: 182.4 LBS | HEART RATE: 72 BPM

## 2021-10-18 DIAGNOSIS — I10 ESSENTIAL HYPERTENSION: ICD-10-CM

## 2021-10-18 DIAGNOSIS — E11.9 TYPE 2 DIABETES MELLITUS WITHOUT COMPLICATION, WITHOUT LONG-TERM CURRENT USE OF INSULIN (HCC): Primary | ICD-10-CM

## 2021-10-18 PROCEDURE — G8536 NO DOC ELDER MAL SCRN: HCPCS | Performed by: FAMILY MEDICINE

## 2021-10-18 PROCEDURE — G8753 SYS BP > OR = 140: HCPCS | Performed by: FAMILY MEDICINE

## 2021-10-18 PROCEDURE — G8754 DIAS BP LESS 90: HCPCS | Performed by: FAMILY MEDICINE

## 2021-10-18 PROCEDURE — G9899 SCRN MAM PERF RSLTS DOC: HCPCS | Performed by: FAMILY MEDICINE

## 2021-10-18 PROCEDURE — G8510 SCR DEP NEG, NO PLAN REQD: HCPCS | Performed by: FAMILY MEDICINE

## 2021-10-18 PROCEDURE — 99214 OFFICE O/P EST MOD 30 MIN: CPT | Performed by: FAMILY MEDICINE

## 2021-10-18 PROCEDURE — 2022F DILAT RTA XM EVC RTNOPTHY: CPT | Performed by: FAMILY MEDICINE

## 2021-10-18 PROCEDURE — 1090F PRES/ABSN URINE INCON ASSESS: CPT | Performed by: FAMILY MEDICINE

## 2021-10-18 PROCEDURE — G8399 PT W/DXA RESULTS DOCUMENT: HCPCS | Performed by: FAMILY MEDICINE

## 2021-10-18 PROCEDURE — G8417 CALC BMI ABV UP PARAM F/U: HCPCS | Performed by: FAMILY MEDICINE

## 2021-10-18 PROCEDURE — 1101F PT FALLS ASSESS-DOCD LE1/YR: CPT | Performed by: FAMILY MEDICINE

## 2021-10-18 PROCEDURE — 3044F HG A1C LEVEL LT 7.0%: CPT | Performed by: FAMILY MEDICINE

## 2021-10-18 PROCEDURE — G8427 DOCREV CUR MEDS BY ELIG CLIN: HCPCS | Performed by: FAMILY MEDICINE

## 2021-10-18 PROCEDURE — 3017F COLORECTAL CA SCREEN DOC REV: CPT | Performed by: FAMILY MEDICINE

## 2021-10-18 NOTE — PROGRESS NOTES
SUBJECTIVE:   Ms. Michael Nguyen is a 77 y.o. female who is here for c/o left lateral calf itcing. Pt's BP in office today 146/78. She endorses taking her medication at around 8 am this morning. Pt denies any headaches. She remarks noticing her BP increasing since the middle of august. Pt indicates her pressure was in the 151's on August 15 th in the evening. She reports her pressure has been 151's/90's. Pt notes she is going to replace her blood pressure cuff. Pt reports an ongoing itch around the left lateral calf. She notes the itching is around the mole. Pt denies any changes to the mole. She remarks having her legs crossed two months ago and sneezed that caused her a sharp pain down the left leg. Pt indicates a week later she had muscle spasms down the leg. She notes the itching is intermittent and does not require scratching. Pt states it feels like a mild tingle. At this time, she is otherwise doing well and has brought no other complaints to my attention today. For a list of the medical issues addressed today, see the assessment and plan below. PMH:   Past Medical History:   Diagnosis Date    Diabetes ([facility] [address]     Hypercholesterolemia     Hypertension     Other ill-defined conditions(799.89)     high cholesterol     PSH:  has a past surgical history that includes hx tubal ligation; hx hysterectomy (3/25/99); hx orthopaedic; and hx rotator cuff repair (10/2017). All: is allergic to percocet [oxycodone-acetaminophen].    MEDS:   Current Outpatient Medications   Medication Sig    metoprolol tartrate (LOPRESSOR) 25 mg tablet TAKE 1 TABLET BY MOUTH TWICE DAILY    rosuvastatin (CRESTOR) 10 mg tablet TAKE 1 TABLET BY MOUTH EVERY EVENING    amLODIPine (NORVASC) 5 mg tablet TAKE 1 TABLET BY MOUTH DAILY    lisinopriL (PRINIVIL, ZESTRIL) 40 mg tablet TAKE 1 TABLET BY MOUTH EVERY DAY    glucose blood VI test strips (OneTouch Verio test strips) strip USE AS DIRECTED TWICE DAILY    Blood-Glucose Meter monitoring kit Check glucose daily.  metFORMIN (GLUCOPHAGE) 500 mg tablet TAKE 1 TABLET BY MOUTH DAILY    mv-min/iron/folic/calcium/vitK (WOMEN'S MULTIVITAMIN PO) Take  by mouth.  Blood-Glucose Meter misc One Touch Ultra. Use as directed to check blood sugars twice daily    hydrochlorothiazide (HYDRODIURIL) 25 mg tablet TAKE ONE TABLET BY MOUTH DAILY    omega-3 fatty acids-vitamin e (FISH OIL) 1,000 mg cap Take 1 Cap by mouth.  hydroCHLOROthiazide (HYDRODIURIL) 25 mg tablet TAKE 1 TABLET BY MOUTH DAILY (Patient not taking: Reported on 10/18/2021)    metFORMIN (GLUCOPHAGE) 500 mg tablet TAKE 1 TABLET BY MOUTH DAILY (Patient not taking: Reported on 10/18/2021)    ergocalciferol (VITAMIN D2) 50,000 unit capsule Take 1,000 Units by mouth. (Patient not taking: Reported on 10/18/2021)     No current facility-administered medications for this visit. FH: family history includes Breast Cancer in her paternal aunt; Cancer in her father; Cancer (age of onset: 64) in her mother; Diabetes in her sister; Hypertension in her brother, brother, father, mother, sister, sister, and sister. SH:  reports that she quit smoking about 31 years ago. She quit after 15.00 years of use. She has never used smokeless tobacco. She reports current alcohol use. She reports that she does not use drugs.      Review of Systems - History obtained from the patient  General ROS: no fever, chills, fatigue, body aches  Psychological ROS: no change in anxiety, depression, SI/HI  Ophthalmic ROS: no blurred vision, myopia, double vision  ENT ROS: no dysphagia, otalgia, otorrhea, rhinorrhea, post nasal drip  Respiratory ROS: no cough, shortness of breath, or wheezing  Cardiovascular ROS: no chest pain or dyspnea on exertion  Gastrointestinal ROS: no abdominal pain, change in bowel habits, or black or bloody stools  Genito-Urinary ROS: no frequency, urgency, incontinence, dysuria, hematuria  Musculoskeletal ROS: no arthralgia, myalgia  Neurological ROS: no headaches, dizziness, lightheadedness, tremors, seizures  Dermatological ROS: no rash or lesions    OBJECTIVE:   Vitals:   Visit Vitals  BP (!) 146/78   Pulse 72   Temp 96.8 °F (36 °C) (Temporal)   Resp 16   Ht 5' 5\" (1.651 m)   Wt 182 lb 6.4 oz (82.7 kg)   LMP 03/25/1999   SpO2 98%   BMI 30.35 kg/m²      Gen: Pleasant 77 y.o.  female in NAD. HEENT: PERRLA. EOMI. OP moist and pink. Neck: Supple. No LAD. HEART: RRR, No M/G/R.      LUNGS: CTAB No W/R. ABDOMEN: S, NT, ND, BS+. EXTREMITIES: Warm. No C/C/E.    MUSCULOSKELETAL: Normal ROM, muscle strength 5/5 all groups. NEURO: Alert and oriented x 3. Cranial nerves grossly intact. No focal sensory or motor deficits noted. SKIN: Warm. Dry. No rashes or other lesions noted. ASSESSMENT/ PLAN: Diagnoses and all orders for this visit:    1. Type 2 diabetes mellitus without complication, without long-term current use of insulin (HCC)  -     METABOLIC PANEL, COMPREHENSIVE; Future  -     HEMOGLOBIN A1C WITH EAG; Future    2. Essential hypertension  -     METABOLIC PANEL, COMPREHENSIVE; Future      1. Type 2 diabetes mellitus without complication   I advised pt to continue current dose of metformin 500 mg tablet avoid sugars and starches, and to increase exercise when possible. Recheck hemoglobin A1c.     2. Essential Hypertension  I recommended continuing current dose of metoprolol tartrate 25 mg tablet and hydrochlorothiazide 25 mg tablet, amlodipine 5 mg tablet, and lisinopril 40 mg tablet, eating a low sodium diet, and increasing exercise. Recheck CMP. I advised her to continue monitoring her BP at home with a new cuff. I requested that she send me some blood pressure readings on 10/21. I believe that she is experiencing nerve irritation. I do not see any erythema, dryness, or lesion. I believe she is responding to a nerve irritation. ICD-10-CM ICD-9-CM    1.  Type 2 diabetes mellitus without complication, without long-term current use of insulin (HCC)  F82.6 198.65 METABOLIC PANEL, COMPREHENSIVE      HEMOGLOBIN A1C WITH EAG   2. Essential hypertension  F88 108.9 METABOLIC PANEL, COMPREHENSIVE      I have reviewed the patient's medications and risks/side effects/benefits were discussed. Diagnosis(-es) explained to patient and questions answered. Literature provided where appropriate.      Written by Danielle Banks, as dictated by Aleida Eric MD.

## 2021-10-18 NOTE — PROGRESS NOTES
Identified pt with two pt identifiers(name and ). Reviewed record in preparation for visit and have obtained necessary documentation. Chief Complaint   Patient presents with    Skin Problem     pt states left lateral caff itchy specific area. x over two months.  Hypertension        Health Maintenance Due   Topic    Foot Exam Q1     Shingrix Vaccine Age 50> (1 of 2)    Medicare Yearly Exam     MICROALBUMIN Q1     Lipid Screen         Visit Vitals  Temp 96.8 °F (36 °C) (Temporal)   Ht 5' 5\" (1.651 m)   Wt 182 lb 6.4 oz (82.7 kg)   LMP 1999   BMI 30.35 kg/m²     Pain Scale: /10    Coordination of Care Questionnaire:  :   1. Have you been to the ER, urgent care clinic since your last visit? Hospitalized since your last visit? No    2. Have you seen or consulted any other health care providers outside of the [address] [place] [address] since your last visit? Include any pap smears or colon screening.  No

## 2021-10-28 ENCOUNTER — APPOINTMENT (OUTPATIENT)
Dept: INTERNAL MEDICINE CLINIC | Age: 66
End: 2021-10-28

## 2021-10-29 LAB
ALBUMIN SERPL-MCNC: 4 G/DL (ref 3.5–5)
ALBUMIN/GLOB SERPL: 1.2 {RATIO} (ref 1.1–2.2)
ALP SERPL-CCNC: 84 U/L (ref 45–117)
ALT SERPL-CCNC: 34 U/L (ref 12–78)
ANION GAP SERPL CALC-SCNC: 0 MMOL/L (ref 5–15)
AST SERPL-CCNC: 16 U/L (ref 15–37)
BILIRUB SERPL-MCNC: 0.4 MG/DL (ref 0.2–1)
BUN SERPL-MCNC: 11 MG/DL (ref 6–20)
BUN/CREAT SERPL: 16 (ref 12–20)
CALCIUM SERPL-MCNC: 9.9 MG/DL (ref 8.5–10.1)
CHLORIDE SERPL-SCNC: 106 MMOL/L (ref 97–108)
CO2 SERPL-SCNC: 33 MMOL/L (ref 21–32)
CREAT SERPL-MCNC: 0.67 MG/DL (ref 0.55–1.02)
EST. AVERAGE GLUCOSE BLD GHB EST-MCNC: 131 MG/DL
GLOBULIN SER CALC-MCNC: 3.4 G/DL (ref 2–4)
GLUCOSE SERPL-MCNC: 113 MG/DL (ref 65–100)
HBA1C MFR BLD: 6.2 % (ref 4–5.6)
POTASSIUM SERPL-SCNC: 4.2 MMOL/L (ref 3.5–5.1)
PROT SERPL-MCNC: 7.4 G/DL (ref 6.4–8.2)
SODIUM SERPL-SCNC: 139 MMOL/L (ref 136–145)

## 2021-11-09 ENCOUNTER — TRANSCRIBE ORDER (OUTPATIENT)
Dept: SCHEDULING | Age: 66
End: 2021-11-09

## 2021-11-09 DIAGNOSIS — Z12.31 SCREENING MAMMOGRAM FOR HIGH-RISK PATIENT: Primary | ICD-10-CM

## 2021-12-08 ENCOUNTER — OFFICE VISIT (OUTPATIENT)
Dept: INTERNAL MEDICINE CLINIC | Age: 66
End: 2021-12-08
Payer: MEDICARE

## 2021-12-08 VITALS
TEMPERATURE: 97.7 F | HEIGHT: 65 IN | HEART RATE: 72 BPM | RESPIRATION RATE: 16 BRPM | DIASTOLIC BLOOD PRESSURE: 85 MMHG | OXYGEN SATURATION: 98 % | SYSTOLIC BLOOD PRESSURE: 142 MMHG | WEIGHT: 184.8 LBS | BODY MASS INDEX: 30.79 KG/M2

## 2021-12-08 DIAGNOSIS — I10 ESSENTIAL HYPERTENSION: ICD-10-CM

## 2021-12-08 DIAGNOSIS — I10 HYPERTENSION, UNSPECIFIED TYPE: Primary | ICD-10-CM

## 2021-12-08 DIAGNOSIS — F43.21 GRIEF REACTION: ICD-10-CM

## 2021-12-08 PROCEDURE — G9899 SCRN MAM PERF RSLTS DOC: HCPCS | Performed by: FAMILY MEDICINE

## 2021-12-08 PROCEDURE — G8754 DIAS BP LESS 90: HCPCS | Performed by: FAMILY MEDICINE

## 2021-12-08 PROCEDURE — G8753 SYS BP > OR = 140: HCPCS | Performed by: FAMILY MEDICINE

## 2021-12-08 PROCEDURE — G0463 HOSPITAL OUTPT CLINIC VISIT: HCPCS | Performed by: FAMILY MEDICINE

## 2021-12-08 PROCEDURE — G8510 SCR DEP NEG, NO PLAN REQD: HCPCS | Performed by: FAMILY MEDICINE

## 2021-12-08 PROCEDURE — G8536 NO DOC ELDER MAL SCRN: HCPCS | Performed by: FAMILY MEDICINE

## 2021-12-08 PROCEDURE — 1090F PRES/ABSN URINE INCON ASSESS: CPT | Performed by: FAMILY MEDICINE

## 2021-12-08 PROCEDURE — 1101F PT FALLS ASSESS-DOCD LE1/YR: CPT | Performed by: FAMILY MEDICINE

## 2021-12-08 PROCEDURE — G8417 CALC BMI ABV UP PARAM F/U: HCPCS | Performed by: FAMILY MEDICINE

## 2021-12-08 PROCEDURE — G8427 DOCREV CUR MEDS BY ELIG CLIN: HCPCS | Performed by: FAMILY MEDICINE

## 2021-12-08 PROCEDURE — G8399 PT W/DXA RESULTS DOCUMENT: HCPCS | Performed by: FAMILY MEDICINE

## 2021-12-08 PROCEDURE — 99214 OFFICE O/P EST MOD 30 MIN: CPT | Performed by: FAMILY MEDICINE

## 2021-12-08 PROCEDURE — 3017F COLORECTAL CA SCREEN DOC REV: CPT | Performed by: FAMILY MEDICINE

## 2021-12-08 RX ORDER — AMLODIPINE BESYLATE 5 MG/1
5 TABLET ORAL DAILY
Qty: 90 TABLET | Refills: 3 | Status: SHIPPED | OUTPATIENT
Start: 2021-12-08 | End: 2022-02-01

## 2021-12-08 NOTE — PROGRESS NOTES
Identified pt with two pt identifiers(name and ). Reviewed record in preparation for visit and have obtained necessary documentation. Chief Complaint   Patient presents with    Elevated Blood Pressure    Follow-up     6 month. Vitals:    21 0851   BP: (!) 142/85   Pulse: 72   Resp: 16   Temp: 97.7 °F (36.5 °C)   TempSrc: Temporal   SpO2: 98%   Weight: 184 lb 12.8 oz (83.8 kg)   Height: 5' 5\" (1.651 m)   PainSc:   0 - No pain   LMP: 1999       Health Maintenance Due   Topic    Foot Exam Q1     DTaP/Tdap/Td series (1 - Tdap)    Shingrix Vaccine Age 50> (1 of 2)    Pneumococcal 65+ years (1 of 1 - PPSV23)    Medicare Yearly Exam     MICROALBUMIN Q1     Lipid Screen     COVID-19 Vaccine (3 - Booster for [place] Peter series)       Coordination of Care Questionnaire:  :   1) Have you been to an emergency room, urgent care, or hospitalized since your last visit? If yes, where when, and reason for visit? no       2. Have seen or consulted any other health care provider since your last visit? If yes, where when, and reason for visit? NO      Patient is accompanied by self I have received verbal consent from Baldo Fox to discuss any/all medical information while they are present in the room. An electronic signature was used to authenticate this note.   -- Herson Puentes LPN

## 2021-12-08 NOTE — PROGRESS NOTES
SUBJECTIVE:   Ms. Edwar Monte is a 77 y.o. female who is here for follow up of routine medical issues. HTN: Pt's BP in the office today was elevated at 142/85. Pt is compliant in taking metoprolol 25 mg BID, HCTZ 25 mg daily, amlodipine 5 mg daily, and lisinopril 40 mg daily. Patient denies chest pain, VALDEZ/SOB, edema, headache, visual changes, dizziness, palpitations or syncope. She is concerned about elevated BP readings at home and notes readings of 149/95, 149/88, 156/76, 164/87. She recently bought a new BP monitor to make sure she was getting accurate readings. She has a FMHx of heart disease on her father's side. She denies frequent use of NSAIDs or any new supplements. She does admit to eating popcorn daily and sprinkles some salt over this. She has otherwise been monitoring her salt intake. She walks 60 min 4x per week for exercise. Grief: She does mention that her boyfriend of 15 years passed away from [id number] in March which may be causing some underlying anxiety. She states that she has been feeling more nervous recently and gets episodes of sadness thinking about her boyfriend's death. She notes that she has been unable to get rid of her boyfriend's clothes in the house. At this time, she is otherwise doing well and has brought no other complaints to my attention today. For a list of the medical issues addressed today, see the assessment and plan below. PMH:   Past Medical History:   Diagnosis Date    Diabetes ([facility] [address]     Hypercholesterolemia     Hypertension     Other ill-defined conditions(799.89)     high cholesterol     PSH:  has a past surgical history that includes hx tubal ligation; hx hysterectomy (3/25/99); hx orthopaedic; and hx rotator cuff repair (10/2017). All: is allergic to percocet [oxycodone-acetaminophen]. MEDS:   Current Outpatient Medications   Medication Sig    amLODIPine (NORVASC) 5 mg tablet Take 1 Tablet by mouth daily.     metoprolol tartrate (LOPRESSOR) 25 mg tablet TAKE 1 TABLET BY MOUTH TWICE DAILY    rosuvastatin (CRESTOR) 10 mg tablet TAKE 1 TABLET BY MOUTH EVERY EVENING    lisinopriL (PRINIVIL, ZESTRIL) 40 mg tablet TAKE 1 TABLET BY MOUTH EVERY DAY    glucose blood VI test strips (OneTouch Verio test strips) strip USE AS DIRECTED TWICE DAILY    Blood-Glucose Meter monitoring kit Check glucose daily.  metFORMIN (GLUCOPHAGE) 500 mg tablet TAKE 1 TABLET BY MOUTH DAILY    mv-min/iron/folic/calcium/vitK (WOMEN'S MULTIVITAMIN PO) Take  by mouth.  Blood-Glucose Meter misc One Touch Ultra. Use as directed to check blood sugars twice daily    hydrochlorothiazide (HYDRODIURIL) 25 mg tablet TAKE ONE TABLET BY MOUTH DAILY    omega-3 fatty acids-vitamin e (FISH OIL) 1,000 mg cap Take 1 Cap by mouth. No current facility-administered medications for this visit. FH: family history includes Breast Cancer in her paternal aunt; Cancer in her father; Cancer (age of onset: 64) in her mother; Diabetes in her sister; Hypertension in her brother, brother, father, mother, sister, sister, and sister. SH:  reports that she quit smoking about 31 years ago. She quit after 15.00 years of use. She has never used smokeless tobacco. She reports current alcohol use. She reports that she does not use drugs. Review of Systems - History obtained from the patient  General ROS: no fever, chills, fatigue, body aches  Psychological ROS: no SI/HI.  +grief reaction  Ophthalmic ROS: no blurred vision, myopia, double vision  ENT ROS: no dysphagia, otalgia, otorrhea, rhinorrhea, post nasal drip  Respiratory ROS: no cough, shortness of breath, or wheezing  Cardiovascular ROS: no chest pain or dyspnea on exertion  Gastrointestinal ROS: no abdominal pain, change in bowel habits, or black or bloody stools  Genito-Urinary ROS: no frequency, urgency, incontinence, dysuria, hematouria  Musculoskeletal ROS: no arthralagia, myalgia  Neurological ROS: no headaches, dizziness, lightheadedness, tremors, seizures  Dermatological ROS: no rash or lesions    OBJECTIVE:   Vitals:   Visit Vitals  BP (!) 142/85 (BP 1 Location: Right arm, BP Patient Position: Sitting, BP Cuff Size: Adult)   Pulse 72   Temp 97.7 °F (36.5 °C) (Temporal)   Resp 16   Ht 5' 5\" (1.651 m)   Wt 184 lb 12.8 oz (83.8 kg)   LMP 03/25/1999   SpO2 98%   BMI 30.75 kg/m²      Gen: Pleasant 77 y.o.  female in NAD. HEENT: PERRLA. EOMI. OP moist and pink. Neck: Supple. No LAD. HEART: RRR, No M/G/R.      LUNGS: CTAB No W/R. ABDOMEN: S, NT, ND, BS+. EXTREMITIES: Warm. No C/C/E.    MUSCULOSKELETAL: Normal ROM, muscle strength 5/5 all groups. NEURO: Alert and oriented x 3. Cranial nerves grossly intact. No focal sensory or motor deficits noted. SKIN: Warm. Dry. No rashes or other lesions noted. ASSESSMENT/ PLAN: Diagnoses and all orders for this visit:    1. Hypertension, unspecified type  -     DUPLEX RENAL ART/CJ BILATERAL; Future    2. Grief reaction  -     REFERRAL TO BEHAVIORAL HEALTH    3. Essential hypertension  -     amLODIPine (NORVASC) 5 mg tablet; Take 1 Tablet by mouth daily. 1. Hypertension  BP is elevated at home and today in office. I reviewed her echo results from 04/2019 which shows no significant structural abnormality. Borderline normal low LV systolic function without regional abnormality. Since her BP is not well controlled on 4 medications, I will check a renal artery duplex to rule out any secondary structural cause for HTN. If her duplex is normal, recommend she see Cardiology. In the meantime, I recommended continuing current dose of metoprolol 25 mg BID, HCTZ 25 mg daily, amlodipine 5 mg daily, and lisinopril 40 mg daily, eating a low sodium diet, and increasing exercise. I refilled her amlodipine at the current dose. 2. Grief reaction  I provided her with a referral to behavioral health to discuss her grief.  We also discussed other ways to deal with grief such as journaling. She was provided with a handout for mental health counselors in the area. ICD-10-CM ICD-9-CM    1. Hypertension, unspecified type  I10 401.9 DUPLEX RENAL ART/CJ BILATERAL   2. Grief reaction  F43.21 309.0 REFERRAL TO BEHAVIORAL HEALTH   3. Essential hypertension  I10 401.9 amLODIPine (NORVASC) 5 mg tablet        Follow-up and Dispositions    · Return in about 3 months (around 3/8/2022) for follow up. I have reviewed the patient's medications and risks/side effects/benefits were discussed. Diagnosis(-es) explained to patient and questions answered. Literature provided where appropriate.       Written by Aly Ng, as dictated by Dr. Porsche Contreras MD.

## 2021-12-16 ENCOUNTER — APPOINTMENT (OUTPATIENT)
Dept: VASCULAR SURGERY | Age: 66
End: 2021-12-16
Attending: FAMILY MEDICINE
Payer: MEDICARE

## 2021-12-16 ENCOUNTER — HOSPITAL ENCOUNTER (OUTPATIENT)
Dept: MAMMOGRAPHY | Age: 66
Discharge: HOME | End: 2021-12-16
Attending: FAMILY MEDICINE
Payer: MEDICARE

## 2021-12-16 DIAGNOSIS — Z12.31 SCREENING MAMMOGRAM FOR HIGH-RISK PATIENT: ICD-10-CM

## 2021-12-16 PROCEDURE — 77067 SCR MAMMO BI INCL CAD: CPT

## 2022-01-11 ENCOUNTER — HOSPITAL ENCOUNTER (OUTPATIENT)
Dept: VASCULAR SURGERY | Age: 67
Discharge: HOME | End: 2022-01-11
Attending: FAMILY MEDICINE
Payer: MEDICARE

## 2022-01-11 VITALS — SYSTOLIC BLOOD PRESSURE: 145 MMHG | DIASTOLIC BLOOD PRESSURE: 81 MMHG

## 2022-01-11 DIAGNOSIS — I10 HYPERTENSION, UNSPECIFIED TYPE: ICD-10-CM

## 2022-01-11 LAB
ABDOMINAL PROX AORTA VEL: 94.1 CM/S
CELIAC EDV: 35.7 CM/S
CELIAC PSV: 193.3 CM/S
DIST AORTIC AP: 1.4 CM
LEFT KIDNEY LENGTH: 9.8 CM
LEFT KIDNEY WIDTH: 5.2 CM
LEFT RENAL DIST DIAS: 23.1 CM/S
LEFT RENAL DIST RAR: 0.64
LEFT RENAL DIST RI: 0.61
LEFT RENAL DIST SYS: 59.8 CM/S
LEFT RENAL LOWER PARENCHYMA MAX: 22.2 CM/S
LEFT RENAL LOWER PARENCHYMA MIN: 9.4 CM/S
LEFT RENAL LOWER PARENCHYMA RI: 0.58
LEFT RENAL MID DIAS: 19.8 CM/S
LEFT RENAL MID RAR: 0.84
LEFT RENAL MID RI: 0.75
LEFT RENAL MID SYS: 79.3 CM/S
LEFT RENAL MIDDLE PARENCHYMA MAX: 33.6 CM/S
LEFT RENAL MIDDLE PARENCHYMA MIN: 11.5 CM/S
LEFT RENAL MIDDLE PARENCHYMA RI: 0.66
LEFT RENAL PROX DIAS: 20 CM/S
LEFT RENAL PROX RAR: 0.81
LEFT RENAL PROX RI: 0.74
LEFT RENAL PROX SYS: 76.5 CM/S
LEFT RENAL UPPER PARENCHYMA MAX: 15.8 CM/S
LEFT RENAL UPPER PARENCHYMA MIN: 8.6 CM/S
LEFT RENAL UPPER PARENCHYMA RI: 0.46
PROX AORTIC AP: 1.9 CM
PROX SMA EDV: 20 CM/S
PROX SMA PSV: 149 CM/S
RIGHT KIDNEY LENGTH: 10.2 CM
RIGHT KIDNEY WIDTH: 5.4 CM
RIGHT RENAL DIST DIAS: 30.3 CM/S
RIGHT RENAL DIST RAR: 1.01
RIGHT RENAL DIST RI: 0.68
RIGHT RENAL DIST SYS: 94.6 CM/S
RIGHT RENAL LOWER PARENCHYMA MAX: 36.7 CM/S
RIGHT RENAL LOWER PARENCHYMA MIN: 14.5 CM/S
RIGHT RENAL LOWER PARENCHYMA RI: 0.6
RIGHT RENAL MID DIAS: 27.6 CM/S
RIGHT RENAL MID RAR: 0.98
RIGHT RENAL MID RI: 0.7
RIGHT RENAL MID SYS: 91.9 CM/S
RIGHT RENAL MIDDLE PARENCHYMA MAX: 32.9 CM/S
RIGHT RENAL MIDDLE PARENCHYMA MIN: 13 CM/S
RIGHT RENAL MIDDLE PARENCHYMA RI: 0.6
RIGHT RENAL PROX DIAS: 33.6 CM/S
RIGHT RENAL PROX RAR: 1.29
RIGHT RENAL PROX RI: 0.72
RIGHT RENAL PROX SYS: 121.4 CM/S
RIGHT RENAL UPPER PARENCHYMA MAX: 19.7 CM/S
RIGHT RENAL UPPER PARENCHYMA MIN: 8.3 CM/S
RIGHT RENAL UPPER PARENCHYMA RI: 0.58

## 2022-01-11 PROCEDURE — 93975 VASCULAR STUDY: CPT

## 2022-01-18 ENCOUNTER — TELEPHONE (OUTPATIENT)
Dept: INTERNAL MEDICINE CLINIC | Age: 67
End: 2022-01-18

## 2022-01-18 ENCOUNTER — TELEPHONE (OUTPATIENT)
Dept: PRIMARY CARE CLINIC | Age: 67
End: 2022-01-18

## 2022-01-18 DIAGNOSIS — I10 HYPERTENSION, UNSPECIFIED TYPE: Primary | ICD-10-CM

## 2022-01-18 NOTE — TELEPHONE ENCOUNTER
Called, spoke with Pt. Two pt identifiers confirmed. Pt informed per Dr. Evaline Epley to increase amlodipine to 1.5 tab daily. Pt informed of referral to cardiology for her hypertension. Pt given contact number to Dr. Lowell Montanez office as well. Pt agreed to call and make the appt and to increase the amlodipine. Pt verbalized understanding of information discussed w/ no further questions at this time. Electronically faxed referral to Dr. Lowell Montanez office.

## 2022-01-18 NOTE — TELEPHONE ENCOUNTER
----- Message from Khadijah Aguilar sent at 1/18/2022  4:25 PM EST -----  Subject: Message to Provider    QUESTIONS  Information for Provider? Pt would like to give Fr. Marry Angel her Blood   Pressure readings per her request. 1/11/22 145/81 1/11/22 141/91 1/14/22   133/89 1/16/22 154/85 1/17/22 157/95 1/18/22 156/92  ---------------------------------------------------------------------------  --------------  CALL BACK INFO  What is the best way for the office to contact you? OK to leave message on   voicemail  Preferred Call Back Phone Number? [phone]  ---------------------------------------------------------------------------  --------------  SCRIPT ANSWERS  Relationship to Patient?  Self

## 2022-01-18 NOTE — TELEPHONE ENCOUNTER
Please advise this patient to increase her amlodipine to 1.5 tab ( 7.5 mg) daily.  I am also referring her to the cardiologist.

## 2022-02-01 ENCOUNTER — OFFICE VISIT (OUTPATIENT)
Dept: CARDIOLOGY CLINIC | Age: 67
End: 2022-02-01
Payer: MEDICARE

## 2022-02-01 VITALS
HEIGHT: 65 IN | WEIGHT: 187.2 LBS | SYSTOLIC BLOOD PRESSURE: 134 MMHG | OXYGEN SATURATION: 98 % | RESPIRATION RATE: 18 BRPM | DIASTOLIC BLOOD PRESSURE: 80 MMHG | BODY MASS INDEX: 31.19 KG/M2 | HEART RATE: 70 BPM

## 2022-02-01 DIAGNOSIS — M79.89 LEG SWELLING: ICD-10-CM

## 2022-02-01 DIAGNOSIS — E78.2 MIXED HYPERLIPIDEMIA: ICD-10-CM

## 2022-02-01 DIAGNOSIS — I10 ESSENTIAL HYPERTENSION: ICD-10-CM

## 2022-02-01 DIAGNOSIS — I10 BENIGN ESSENTIAL HTN: Primary | ICD-10-CM

## 2022-02-01 PROCEDURE — 3017F COLORECTAL CA SCREEN DOC REV: CPT | Performed by: INTERNAL MEDICINE

## 2022-02-01 PROCEDURE — G8417 CALC BMI ABV UP PARAM F/U: HCPCS | Performed by: INTERNAL MEDICINE

## 2022-02-01 PROCEDURE — 93010 ELECTROCARDIOGRAM REPORT: CPT | Performed by: INTERNAL MEDICINE

## 2022-02-01 PROCEDURE — 1090F PRES/ABSN URINE INCON ASSESS: CPT | Performed by: INTERNAL MEDICINE

## 2022-02-01 PROCEDURE — G8536 NO DOC ELDER MAL SCRN: HCPCS | Performed by: INTERNAL MEDICINE

## 2022-02-01 PROCEDURE — 93005 ELECTROCARDIOGRAM TRACING: CPT | Performed by: INTERNAL MEDICINE

## 2022-02-01 PROCEDURE — G8754 DIAS BP LESS 90: HCPCS | Performed by: INTERNAL MEDICINE

## 2022-02-01 PROCEDURE — 1101F PT FALLS ASSESS-DOCD LE1/YR: CPT | Performed by: INTERNAL MEDICINE

## 2022-02-01 PROCEDURE — G8399 PT W/DXA RESULTS DOCUMENT: HCPCS | Performed by: INTERNAL MEDICINE

## 2022-02-01 PROCEDURE — G0463 HOSPITAL OUTPT CLINIC VISIT: HCPCS | Performed by: INTERNAL MEDICINE

## 2022-02-01 PROCEDURE — G8752 SYS BP LESS 140: HCPCS | Performed by: INTERNAL MEDICINE

## 2022-02-01 PROCEDURE — 99204 OFFICE O/P NEW MOD 45 MIN: CPT | Performed by: INTERNAL MEDICINE

## 2022-02-01 PROCEDURE — G8427 DOCREV CUR MEDS BY ELIG CLIN: HCPCS | Performed by: INTERNAL MEDICINE

## 2022-02-01 PROCEDURE — G8510 SCR DEP NEG, NO PLAN REQD: HCPCS | Performed by: INTERNAL MEDICINE

## 2022-02-01 PROCEDURE — G9899 SCRN MAM PERF RSLTS DOC: HCPCS | Performed by: INTERNAL MEDICINE

## 2022-02-01 RX ORDER — AMLODIPINE BESYLATE 10 MG/1
10 TABLET ORAL DAILY
Qty: 90 TABLET | Refills: 4 | Status: SHIPPED | OUTPATIENT
Start: 2022-02-01 | End: 2022-06-24 | Stop reason: SDUPTHER

## 2022-02-01 NOTE — PROGRESS NOTES
[address] [facility] – [place], [address] [facility]  [phone]     Subjective:      Zoe Marie is a 77 y.o. female is here for new patient consultation. Pmhx HTN, HLD, DM and GERD. Denies hx smoking. + social drink in moderation. No family hx CAD. Referred by PCP for HTN management. Bp today controlled, on 4 medications. She is exercising--walking x 1 hr at least 4 times a week, no exertional symptoms. Denies any hx snoring / daytime naps / morning headaches. Very mild occasional ankle swelling  Has been under a lot of stress, grieving loss of long time boyfriend / significant other. The patient denies chest pain/ shortness of breath, orthopnea, PND, LE edema, palpitations, syncope, or presyncope.        Patient Active Problem List    Diagnosis Date Noted    Menopausal and postmenopausal disorder 05/13/2016    HLD (hyperlipidemia) 04/02/2015    History of hysterectomy including cervix 02/07/2012    Diabetes mellitus ([facility] [address] 02/07/2012    HTN (hypertension) 02/07/2012      Darby Slaughter MD  Past Medical History:   Diagnosis Date    Diabetes ([address]     Hypercholesterolemia     Hypertension     Murmur     Other ill-defined conditions(799.89)     high cholesterol      Past Surgical History:   Procedure Laterality Date    HX HYSTERECTOMY  3/25/99    SARAVANAN    HX ORTHOPAEDIC      left foot surgery    HX ROTATOR CUFF REPAIR  10/2017    HX TUBAL LIGATION       Allergies   Allergen Reactions    Percocet [Oxycodone-Acetaminophen] Hives      Family History   Problem Relation Age of Onset    Hypertension Mother     Cancer Mother 64        Stomach cancer    Hypertension Father     Cancer Father         Stomach cancer    Hypertension Sister     Diabetes Sister     Hypertension Brother     Hypertension Sister     Hypertension Sister     Hypertension Brother     Breast Cancer Paternal Aunt         over 48      Social History     Socioeconomic History    Marital status:      Spouse name: Not on file    Number of children: Not on file    Years of education: Not on file    Highest education level: Not on file   Occupational History    Not on file   Tobacco Use    Smoking status: Former Smoker     Years: 15.00     Quit date: 1990     Years since quittin.8    Smokeless tobacco: Never Used   Vaping Use    Vaping Use: Never used   Substance and Sexual Activity    Alcohol use: Yes     Comment: socially    Drug use: Never    Sexual activity: Not Currently     Partners: Male     Birth control/protection: Surgical   Other Topics Concern    Not on file   Social History Narrative    Not on file     Social Determinants of Health     Financial Resource Strain:     Difficulty of Paying Living Expenses: Not on file   Food Insecurity:     Worried About [employer] [employer] in the Last Year: Not on file    [address] [employer] in the Last Year: Not on file   Transportation Needs:     Lack of Transportation (Medical): Not on file    Lack of Transportation (Non-Medical):  Not on file   Physical Activity:     Days of Exercise per Week: Not on file    Minutes of Exercise per Session: Not on file   Stress:     Feeling of Stress : Not on file   Social Connections:     Frequency of Communication with Friends and Family: Not on file    Frequency of Social Gatherings with Friends and Family: Not on file    Attends Religion Services: Not on file    Active Member of [address] [place] [address] or Organizations: Not on file    Attends Club or Organization Meetings: Not on file    Marital Status: Not on file   Intimate Partner Violence:     Fear of Current or Ex-Partner: Not on file    Emotionally Abused: Not on file    Physically Abused: Not on file    Sexually Abused: Not on file   Housing Stability:     Unable to Pay for Housing in the Last Year: Not on file    Number of [place] in the Last Year: Not on file    Unstable Housing in the Last Year: Not on file      Current Outpatient Medications   Medication Sig    metoprolol tartrate (LOPRESSOR) 25 mg tablet TAKE 1 TABLET BY MOUTH TWICE DAILY    rosuvastatin (CRESTOR) 10 mg tablet TAKE 1 TABLET BY MOUTH EVERY EVENING    lisinopriL (PRINIVIL, ZESTRIL) 40 mg tablet TAKE 1 TABLET BY MOUTH EVERY DAY    glucose blood VI test strips (OneTouch Verio test strips) strip USE AS DIRECTED TWICE DAILY    Blood-Glucose Meter monitoring kit Check glucose daily.  metFORMIN (GLUCOPHAGE) 500 mg tablet TAKE 1 TABLET BY MOUTH DAILY    mv-min/iron/folic/calcium/vitK (WOMEN'S MULTIVITAMIN PO) Take  by mouth.  Blood-Glucose Meter misc One Touch Ultra. Use as directed to check blood sugars twice daily    hydrochlorothiazide (HYDRODIURIL) 25 mg tablet TAKE ONE TABLET BY MOUTH DAILY    omega-3 fatty acids-vitamin e (FISH OIL) 1,000 mg cap Take 1 Cap by mouth.  amLODIPine (NORVASC) 5 mg tablet Take 1 Tablet by mouth daily. (Patient taking differently: Take 7.5 mg by mouth daily. 7.5 mg daily as of 1/19/22)     No current facility-administered medications for this visit. Review of Symptoms:  11 systems reviewed, negative other than as stated in the HPI    Physical ExamPhysical Exam:    Vitals:    02/01/22 0929 02/01/22 0941   BP: (!) 144/90 138/88   Resp: 18    SpO2: 98%    Weight: 187 lb 3.2 oz (84.9 kg)    Height: 5' 5\" (1.651 m)      Body mass index is 31.15 kg/m². General PE  Gen:  NAD  Mental Status - Alert. General Appearance - Not in acute distress. HEENT:  PERRL, no carotid bruits or JVD  Chest and Lung Exam   Inspection: Accessory muscles - No use of accessory muscles in breathing. Auscultation:   Breath sounds: - Normal.   Cardiovascular   Inspection: Jugular vein - Bilateral - Inspection Normal.   Palpation/Percussion:   Apical Impulse: - Normal.   Auscultation: Rhythm - Regular. Heart Sounds - S1 WNL and S2 WNL. No S3 or S4. Murmurs & Other Heart Sounds: Auscultation of the heart reveals - No Murmurs.    Peripheral Vascular Upper Extremity: Inspection - Bilateral - No Cyanotic nailbeds or Digital clubbing. Lower Extremity:   Palpation: Edema - Bilateral - No edema. Abdomen:   Soft, non-tender, bowel sounds are active. Neuro: A&O times 3, CN and motor grossly WNL    Labs:   Lab Results   Component Value Date/Time    Cholesterol, total 166 10/20/2020 07:40 AM    Cholesterol, total 155 11/11/2019 08:50 AM    Cholesterol, total 149 11/12/2018 08:50 AM    Cholesterol, total 181 06/13/2017 11:00 AM    Cholesterol, total 159 09/13/2016 11:35 AM    HDL Cholesterol 60 10/20/2020 07:40 AM    HDL Cholesterol 58 11/11/2019 08:50 AM    HDL Cholesterol 50 11/12/2018 08:50 AM    HDL Cholesterol 67 06/13/2017 11:00 AM    HDL Cholesterol 55 09/13/2016 11:35 AM    LDL, calculated 92 10/20/2020 07:40 AM    LDL, calculated 86 11/11/2019 08:50 AM    LDL, calculated 85 11/12/2018 08:50 AM    LDL, calculated 93 06/13/2017 11:00 AM    LDL, calculated 85 09/13/2016 11:35 AM    LDL, calculated 78 04/16/2015 07:32 AM    Triglyceride 75 10/20/2020 07:40 AM    Triglyceride 56 11/11/2019 08:50 AM    Triglyceride 72 11/12/2018 08:50 AM    Triglyceride 104 06/13/2017 11:00 AM    Triglyceride 95 09/13/2016 11:35 AM     No results found for: CPK, CPKX, CPX  Lab Results   Component Value Date/Time    Sodium 139 10/28/2021 08:28 AM    Potassium 4.2 10/28/2021 08:28 AM    Chloride 106 10/28/2021 08:28 AM    CO2 33 (H) 10/28/2021 08:28 AM    Anion gap 0 (L) 10/28/2021 08:28 AM    Glucose 113 (H) 10/28/2021 08:28 AM    BUN 11 10/28/2021 08:28 AM    Creatinine 0.67 10/28/2021 08:28 AM    BUN/Creatinine ratio 16 10/28/2021 08:28 AM    GFR est AA >60 10/28/2021 08:28 AM    GFR est non-AA >60 10/28/2021 08:28 AM    Calcium 9.9 10/28/2021 08:28 AM    Bilirubin, total 0.4 10/28/2021 08:28 AM    Alk.  phosphatase 84 10/28/2021 08:28 AM    Protein, total 7.4 10/28/2021 08:28 AM    Albumin 4.0 10/28/2021 08:28 AM    Globulin 3.4 10/28/2021 08:28 AM    A-G Ratio 1.2 10/28/2021 08:28 AM    ALT (SGPT) 34 10/28/2021 08:28 AM       EKG:  NSR      Assessment:     Assessment:        ICD-10-CM ICD-9-CM    1. Benign essential HTN  I10 401.1 AMB POC EKG ROUTINE W/ 12 LEADS, INTER & REP   2. Mixed hyperlipidemia  E78.2 272.2    3. Leg swelling  M79.89 729.81        Orders Placed This Encounter    AMB POC EKG ROUTINE W/ 12 LEADS, INTER & REP     Order Specific Question:   Reason for Exam:     Answer:   ROUTINE        Plan:     HTN  Renal artery duplex 1/2022 noted  metoprolol 25 mg BID, HCTZ 25 mg daily, amlodipine 7.5 mg daily, and lisinopril 40 mg daily  Stable kidney fxn 10/2021    Normal EF 45-50% no significant valvular disease per echo in 2019  Repeat echo    HLD  10/2020 LDL 92 On rosuva 10 mg daily Labs and lipids per PCP    DM  On Metformin, followed by PCP    Rashel Conroy NP       Patient seen and examined by me with nurse practitioner. I personally performed all components of the history, physical, and medical decision making and agree with the assessment and plan as noted. Home BP log reviewed. Increase amlodipine to 10 mg daily. Continue other meds. Check Echo. On statin. Diet, exercise, wt loss d/w patient.      Bruce Bains MD

## 2022-02-08 ENCOUNTER — ANCILLARY PROCEDURE (OUTPATIENT)
Dept: CARDIOLOGY CLINIC | Age: 67
End: 2022-02-08
Payer: MEDICARE

## 2022-02-08 VITALS
HEIGHT: 65 IN | WEIGHT: 187 LBS | DIASTOLIC BLOOD PRESSURE: 80 MMHG | BODY MASS INDEX: 31.16 KG/M2 | SYSTOLIC BLOOD PRESSURE: 134 MMHG

## 2022-02-08 DIAGNOSIS — M79.89 LEG SWELLING: ICD-10-CM

## 2022-02-08 DIAGNOSIS — E78.2 MIXED HYPERLIPIDEMIA: ICD-10-CM

## 2022-02-08 DIAGNOSIS — I10 BENIGN ESSENTIAL HTN: ICD-10-CM

## 2022-02-08 PROCEDURE — 93306 TTE W/DOPPLER COMPLETE: CPT | Performed by: INTERNAL MEDICINE

## 2022-02-09 ENCOUNTER — TELEPHONE (OUTPATIENT)
Dept: CARDIOLOGY CLINIC | Age: 67
End: 2022-02-09

## 2022-02-09 LAB
ECHO AO ASC DIAM: 3.2 CM
ECHO AO ASCENDING AORTA INDEX: 1.67 CM/M2
ECHO AO ROOT DIAM: 2.7 CM
ECHO AO ROOT INDEX: 1.41 CM/M2
ECHO AV PEAK GRADIENT: 12 MMHG
ECHO AV PEAK VELOCITY: 1.7 M/S
ECHO AV VELOCITY RATIO: 0.47
ECHO EST RA PRESSURE: 3 MMHG
ECHO LA DIAMETER INDEX: 2.24 CM/M2
ECHO LA DIAMETER: 4.3 CM
ECHO LA TO AORTIC ROOT RATIO: 1.59
ECHO LA VOL 2C: 62 ML (ref 22–52)
ECHO LA VOL 4C: 39 ML (ref 22–52)
ECHO LA VOL BP: 51 ML (ref 22–52)
ECHO LA VOL/BSA BIPLANE: 27 ML/M2 (ref 16–34)
ECHO LA VOLUME AREA LENGTH: 53 ML
ECHO LA VOLUME INDEX A2C: 32 ML/M2 (ref 16–34)
ECHO LA VOLUME INDEX A4C: 20 ML/M2 (ref 16–34)
ECHO LA VOLUME INDEX AREA LENGTH: 28 ML/M2 (ref 16–34)
ECHO LV E' LATERAL VELOCITY: 7 CM/S
ECHO LV E' SEPTAL VELOCITY: 5 CM/S
ECHO LV FRACTIONAL SHORTENING: 38 % (ref 28–44)
ECHO LV INTERNAL DIMENSION DIASTOLE INDEX: 2.71 CM/M2
ECHO LV INTERNAL DIMENSION DIASTOLIC: 5.2 CM (ref 3.9–5.3)
ECHO LV INTERNAL DIMENSION SYSTOLIC INDEX: 1.67 CM/M2
ECHO LV INTERNAL DIMENSION SYSTOLIC: 3.2 CM
ECHO LV ISOVOLUMETRIC RELAXATION TIME (IVRT): 112.3 MS
ECHO LV IVSD: 1 CM (ref 0.6–0.9)
ECHO LV MASS 2D: 194.2 G (ref 67–162)
ECHO LV MASS INDEX 2D: 101.1 G/M2 (ref 43–95)
ECHO LV POSTERIOR WALL DIASTOLIC: 1 CM (ref 0.6–0.9)
ECHO LV RELATIVE WALL THICKNESS RATIO: 0.38
ECHO LVOT PEAK GRADIENT: 2 MMHG
ECHO LVOT PEAK VELOCITY: 0.8 M/S
ECHO MV "A" WAVE DURATION: 156 MSEC
ECHO MV A VELOCITY: 0.7 M/S
ECHO MV E DECELERATION TIME (DT): 180.8 MS
ECHO MV E VELOCITY: 0.45 M/S
ECHO MV E/A RATIO: 0.64
ECHO MV E/E' LATERAL: 6.43
ECHO MV E/E' RATIO (AVERAGED): 7.71
ECHO MV E/E' SEPTAL: 9
ECHO RIGHT VENTRICULAR SYSTOLIC PRESSURE (RVSP): 27 MMHG
ECHO RV FREE WALL PEAK S': 15 CM/S
ECHO RV TAPSE: 2.2 CM (ref 1.5–2)
ECHO TV REGURGITANT MAX VELOCITY: 2.44 M/S
ECHO TV REGURGITANT PEAK GRADIENT: 24 MMHG

## 2022-02-09 PROCEDURE — 93306 TTE W/DOPPLER COMPLETE: CPT | Performed by: INTERNAL MEDICINE

## 2022-02-09 NOTE — TELEPHONE ENCOUNTER
----- Message from Shimon Monge NP sent at 2/9/2022  2:44 PM EST -----  Echo showed normal pumping heart strength, valves look good and healthy. Continue good bp control      Called pt,verified pt with two pt identifiers, advised pt her echo showed normal heart pumping strength,valves look good and healthy. Continue good bp control. Pt verbalized it back to me and she verbalized understanding.

## 2022-02-20 RX ORDER — METFORMIN HYDROCHLORIDE 500 MG/1
TABLET ORAL
Qty: 90 TABLET | Refills: 1 | Status: SHIPPED | OUTPATIENT
Start: 2022-02-20 | End: 2022-08-15

## 2022-03-13 DIAGNOSIS — I10 ESSENTIAL HYPERTENSION: ICD-10-CM

## 2022-03-13 RX ORDER — METOPROLOL TARTRATE 25 MG/1
TABLET, FILM COATED ORAL
Qty: 180 TABLET | Refills: 1 | Status: SHIPPED | OUTPATIENT
Start: 2022-03-13 | End: 2022-09-09

## 2022-03-13 RX ORDER — LISINOPRIL 40 MG/1
TABLET ORAL
Qty: 90 TABLET | Refills: 2 | Status: SHIPPED | OUTPATIENT
Start: 2022-03-13

## 2022-03-14 ENCOUNTER — OFFICE VISIT (OUTPATIENT)
Dept: INTERNAL MEDICINE CLINIC | Age: 67
End: 2022-03-14
Payer: MEDICARE

## 2022-03-14 VITALS
WEIGHT: 188.2 LBS | TEMPERATURE: 96.8 F | BODY MASS INDEX: 31.36 KG/M2 | SYSTOLIC BLOOD PRESSURE: 138 MMHG | HEIGHT: 65 IN | DIASTOLIC BLOOD PRESSURE: 72 MMHG | RESPIRATION RATE: 16 BRPM | HEART RATE: 71 BPM | OXYGEN SATURATION: 99 %

## 2022-03-14 DIAGNOSIS — E11.9 TYPE 2 DIABETES MELLITUS WITHOUT COMPLICATION, WITHOUT LONG-TERM CURRENT USE OF INSULIN (HCC): ICD-10-CM

## 2022-03-14 DIAGNOSIS — E78.2 MIXED HYPERLIPIDEMIA: ICD-10-CM

## 2022-03-14 DIAGNOSIS — I10 PRIMARY HYPERTENSION: Primary | ICD-10-CM

## 2022-03-14 PROCEDURE — G8399 PT W/DXA RESULTS DOCUMENT: HCPCS | Performed by: FAMILY MEDICINE

## 2022-03-14 PROCEDURE — 99214 OFFICE O/P EST MOD 30 MIN: CPT | Performed by: FAMILY MEDICINE

## 2022-03-14 PROCEDURE — G8536 NO DOC ELDER MAL SCRN: HCPCS | Performed by: FAMILY MEDICINE

## 2022-03-14 PROCEDURE — 3017F COLORECTAL CA SCREEN DOC REV: CPT | Performed by: FAMILY MEDICINE

## 2022-03-14 PROCEDURE — G8427 DOCREV CUR MEDS BY ELIG CLIN: HCPCS | Performed by: FAMILY MEDICINE

## 2022-03-14 PROCEDURE — G8752 SYS BP LESS 140: HCPCS | Performed by: FAMILY MEDICINE

## 2022-03-14 PROCEDURE — 2022F DILAT RTA XM EVC RTNOPTHY: CPT | Performed by: FAMILY MEDICINE

## 2022-03-14 PROCEDURE — G8754 DIAS BP LESS 90: HCPCS | Performed by: FAMILY MEDICINE

## 2022-03-14 PROCEDURE — G8510 SCR DEP NEG, NO PLAN REQD: HCPCS | Performed by: FAMILY MEDICINE

## 2022-03-14 PROCEDURE — G0463 HOSPITAL OUTPT CLINIC VISIT: HCPCS | Performed by: FAMILY MEDICINE

## 2022-03-14 PROCEDURE — 1101F PT FALLS ASSESS-DOCD LE1/YR: CPT | Performed by: FAMILY MEDICINE

## 2022-03-14 PROCEDURE — 1090F PRES/ABSN URINE INCON ASSESS: CPT | Performed by: FAMILY MEDICINE

## 2022-03-14 PROCEDURE — G9899 SCRN MAM PERF RSLTS DOC: HCPCS | Performed by: FAMILY MEDICINE

## 2022-03-14 PROCEDURE — G8417 CALC BMI ABV UP PARAM F/U: HCPCS | Performed by: FAMILY MEDICINE

## 2022-03-14 PROCEDURE — 3046F HEMOGLOBIN A1C LEVEL >9.0%: CPT | Performed by: FAMILY MEDICINE

## 2022-03-14 NOTE — PROGRESS NOTES
1. \"Have you been to the ER, urgent care clinic since your last visit? Hospitalized since your last visit? \" No    2. \"Have you seen or consulted any other health care providers outside of the [address] [place] [address] since your last visit? \" No     3. For patients aged 39-70: Has the patient had a colonoscopy / FIT/ Cologuard? Yes - no Care Gap present      If the patient is female:    4. For patients aged 41-77: Has the patient had a mammogram within the past 2 years? Yes - no Care Gap present      5. For patients aged 21-65: Has the patient had a pap smear?  NA - based on age or sex

## 2022-03-14 NOTE — PROGRESS NOTES
SUBJECTIVE:   Ms. Patience Dale is a 77 y.o. female who is here for follow up of routine medical issues. Her blood pressure reading has been 141/82, 152/82, 141/81, and 139/75. She has used a different blood pressure cuff. She takes her medication at 8am, and she checks her blood pressure in the afternoon and evening. She has been taking Advil for her wrist pain. During the visit with her cardiologist, the cardiologist increased the dosage of Amlodipine. She denies side effects from her new dosage of Amlodipine. She denies chest pain, bowel movements abnormalities, urinary changes, and SOB. At this time, she is otherwise doing well and has brought no other complaints to my attention today. For a list of the medical issues addressed today, see the assessment and plan below. PREVENTIVE:  Mammogram: UTD  Dexa:  UTD (Normal)   Shingrix: Declined  COVID Booster: Considered     PMH:   Past Medical History:   Diagnosis Date    Diabetes ([facility] [address]     Hypercholesterolemia     Hypertension     Murmur     Other ill-defined conditions(799.89)     high cholesterol     PSH:  has a past surgical history that includes hx tubal ligation; hx orthopaedic; hx rotator cuff repair (10/2017); and hx hysterectomy (3/25/99). All: is allergic to percocet [oxycodone-acetaminophen]. MEDS:   Current Outpatient Medications   Medication Sig    metoprolol tartrate (LOPRESSOR) 25 mg tablet TAKE 1 TABLET BY MOUTH TWICE DAILY    lisinopriL (PRINIVIL, ZESTRIL) 40 mg tablet TAKE 1 TABLET BY MOUTH EVERY DAY    metFORMIN (GLUCOPHAGE) 500 mg tablet TAKE 1 TABLET BY MOUTH DAILY    amLODIPine (NORVASC) 10 mg tablet Take 1 Tablet by mouth daily.  rosuvastatin (CRESTOR) 10 mg tablet TAKE 1 TABLET BY MOUTH EVERY EVENING    glucose blood VI test strips (OneTouch Verio test strips) strip USE AS DIRECTED TWICE DAILY    Blood-Glucose Meter monitoring kit Check glucose daily.     mv-min/iron/folic/calcium/vitK (WOMEN'S MULTIVITAMIN PO) Take  by mouth.  Blood-Glucose Meter misc One Touch Ultra. Use as directed to check blood sugars twice daily    hydrochlorothiazide (HYDRODIURIL) 25 mg tablet TAKE ONE TABLET BY MOUTH DAILY    omega-3 fatty acids-vitamin e (FISH OIL) 1,000 mg cap Take 1 Cap by mouth. No current facility-administered medications for this visit. FH: family history includes Breast Cancer in her paternal aunt; Cancer in her father; Cancer (age of onset: 64) in her mother; Diabetes in her sister; Hypertension in her brother, brother, father, mother, sister, sister, and sister. SH:  reports that she quit smoking about 31 years ago. She quit after 15.00 years of use. She has never used smokeless tobacco. She reports current alcohol use. She reports that she does not use drugs. Review of Systems - History obtained from the patient  General ROS: no fever, chills, fatigue, body aches  Psychological ROS: no change in anxiety, depression, SI/HI  Ophthalmic ROS: no blurred vision, myopia, double vision  ENT ROS: no dysphagia, otalgia, otorrhea, rhinorrhea, post nasal drip  Respiratory ROS: no cough, shortness of breath, or wheezing  Cardiovascular ROS: no chest pain or dyspnea on exertion  Gastrointestinal ROS: no abdominal pain, change in bowel habits, or black or bloody stools  Genito-Urinary ROS: no frequency, urgency, incontinence, dysuria, hematouria  Musculoskeletal ROS: no arthralagia, myalgia  Neurological ROS: no headaches, dizziness, lightheadedness, tremors, seizures  Dermatological ROS: no rash or lesions    OBJECTIVE:   Vitals:   Visit Vitals  /72 (BP 1 Location: Right arm, BP Patient Position: Sitting, BP Cuff Size: Large adult)   Pulse 71   Temp 96.8 °F (36 °C) (Temporal)   Resp 16   Ht 5' 5\" (1.651 m)   Wt 188 lb 3.2 oz (85.4 kg)   LMP 03/25/1999   SpO2 99%   BMI 31.32 kg/m²      Gen: Pleasant 77 y.o.  female in NAD. HEENT: PERRLA. EOMI. OP moist and pink. Neck: Supple. No LAD.    HEART: RRR, No M/G/R.      LUNGS: CTAB No W/R. ABDOMEN: S, NT, ND, BS+. EXTREMITIES: Warm. No C/C/E.    MUSCULOSKELETAL: Normal ROM, muscle strength 5/5 all groups. NEURO: Alert and oriented x 3. Cranial nerves grossly intact. No focal sensory or motor deficits noted. SKIN: Warm. Dry. No rashes or other lesions noted. ASSESSMENT/ PLAN: Diagnoses and all orders for this visit:    1. Primary hypertension  -     METABOLIC PANEL, COMPREHENSIVE; Future    2. Mixed hyperlipidemia  -     METABOLIC PANEL, COMPREHENSIVE; Future  -     LIPID PANEL; Future    3. Type 2 diabetes mellitus without complication, without long-term current use of insulin (HCC)  -     METABOLIC PANEL, COMPREHENSIVE; Future  -     HEMOGLOBIN A1C WITH EAG; Future        ICD-10-CM ICD-9-CM    1. Primary hypertension  T75 341.7 METABOLIC PANEL, COMPREHENSIVE   2. Mixed hyperlipidemia  F26.4 159.0 METABOLIC PANEL, COMPREHENSIVE      LIPID PANEL   3. Type 2 diabetes mellitus without complication, without long-term current use of insulin ([facility])  L43.5 158.58 METABOLIC PANEL, COMPREHENSIVE      HEMOGLOBIN A1C WITH EAG          Hypertension: BP seems to be well controlled. I recommended continuing current dose of medication, eating a low sodium diet, and increasing exercise. Recheck CMP. I explain possible side effects from the Amlodipine, which she has been on for a month. I advised her to stay active to reduce edema flare ups. Mixed hyperlipidemia: I ordered a CMP and lipid panel. Type 2 diabetes mellitus without complication, without long-term current use of insulin ([facility] [address]): I ordered a CMP and HGB A1c. I have reviewed the patient's medications and risks/side effects/benefits were discussed. Diagnosis(-es) explained to patient and questions answered. Literature provided where appropriate.      Written by Brit Reese, as dictated by Shital Cisse MD.

## 2022-03-23 NOTE — TELEPHONE ENCOUNTER
Patient states she needs to request 90 day supplies to be done [facility] on file/Indicated. Please call if any questions.  Thank you

## 2022-03-27 RX ORDER — HYDROCHLOROTHIAZIDE 25 MG/1
25 TABLET ORAL DAILY
Qty: 90 TABLET | Refills: 2 | Status: SHIPPED | OUTPATIENT
Start: 2022-03-27

## 2022-05-27 ENCOUNTER — LAB ONLY (OUTPATIENT)
Dept: INTERNAL MEDICINE CLINIC | Age: 67
End: 2022-05-27

## 2022-05-27 DIAGNOSIS — E11.9 TYPE 2 DIABETES MELLITUS WITHOUT COMPLICATION, WITHOUT LONG-TERM CURRENT USE OF INSULIN (HCC): ICD-10-CM

## 2022-05-27 DIAGNOSIS — E78.2 MIXED HYPERLIPIDEMIA: ICD-10-CM

## 2022-05-27 DIAGNOSIS — I10 PRIMARY HYPERTENSION: ICD-10-CM

## 2022-05-27 LAB
ALBUMIN SERPL-MCNC: 4 G/DL (ref 3.5–5)
ALBUMIN/GLOB SERPL: 1.3 {RATIO} (ref 1.1–2.2)
ALP SERPL-CCNC: 77 U/L (ref 45–117)
ALT SERPL-CCNC: 31 U/L (ref 12–78)
ANION GAP SERPL CALC-SCNC: 5 MMOL/L (ref 5–15)
AST SERPL-CCNC: 19 U/L (ref 15–37)
BILIRUB SERPL-MCNC: 0.3 MG/DL (ref 0.2–1)
BUN SERPL-MCNC: 17 MG/DL (ref 6–20)
BUN/CREAT SERPL: 24 (ref 12–20)
CALCIUM SERPL-MCNC: 9.8 MG/DL (ref 8.5–10.1)
CHLORIDE SERPL-SCNC: 104 MMOL/L (ref 97–108)
CHOLEST SERPL-MCNC: 170 MG/DL
CO2 SERPL-SCNC: 30 MMOL/L (ref 21–32)
CREAT SERPL-MCNC: 0.72 MG/DL (ref 0.55–1.02)
CREAT UR-MCNC: 197 MG/DL
EST. AVERAGE GLUCOSE BLD GHB EST-MCNC: 143 MG/DL
GLOBULIN SER CALC-MCNC: 3.2 G/DL (ref 2–4)
GLUCOSE SERPL-MCNC: 147 MG/DL (ref 65–100)
HBA1C MFR BLD: 6.6 % (ref 4–5.6)
HDLC SERPL-MCNC: 71 MG/DL
HDLC SERPL: 2.4 {RATIO} (ref 0–5)
LDLC SERPL CALC-MCNC: 84 MG/DL (ref 0–100)
MICROALBUMIN UR-MCNC: 5.82 MG/DL
MICROALBUMIN/CREAT UR-RTO: 30 MG/G (ref 0–30)
POTASSIUM SERPL-SCNC: 4.2 MMOL/L (ref 3.5–5.1)
PROT SERPL-MCNC: 7.2 G/DL (ref 6.4–8.2)
SODIUM SERPL-SCNC: 139 MMOL/L (ref 136–145)
TRIGL SERPL-MCNC: 75 MG/DL (ref ?–150)
VLDLC SERPL CALC-MCNC: 15 MG/DL

## 2022-06-15 NOTE — PROGRESS NOTES
A1c:Your current hgbA1c is higher than your last level. Work on following a diabetic diet and exercise. Recheck this test: hgbA1c  in  3 months. Continue with current  medications. CMP:Normal electrolyte levels, renal, and liver function.    Lipid panel: Normal  Microalbumin: Normal

## 2022-06-24 DIAGNOSIS — I10 ESSENTIAL HYPERTENSION: ICD-10-CM

## 2022-06-24 NOTE — TELEPHONE ENCOUNTER
----- Message from Ariana Meade sent at 6/24/2022  2:54 PM EDT -----  Subject: Refill Request    QUESTIONS  Name of Medication? amLODIPine (NORVASC) 10 mg tablet  Patient-reported dosage and instructions? 10 MG once daily   How many days do you have left? 4  [id number]  Pharmacy phone number (if available)? [phone]  Additional Information for Provider? Patient stating that last time she   filled the pharmacy gave her 5 MG and stated that the provider did the   script as that, she states she has always taken the 10 MG   ---------------------------------------------------------------------------  --------------  CALL BACK INFO  What is the best way for the office to contact you? OK to leave message on   voicemail  Preferred Call Back Phone Number? [phone]  ---------------------------------------------------------------------------  --------------  SCRIPT ANSWERS  Relationship to Patient?  Self

## 2022-06-24 NOTE — TELEPHONE ENCOUNTER
Future Appointments:  Future Appointments   Date Time Provider Gerald Varelai   9/21/2022  8:40 AM Cezar Rivas MD [facility] BS AMB        Last Appointment With Me:  3/14/2022     Requested Prescriptions     Pending Prescriptions Disp Refills    amLODIPine (NORVASC) 10 mg tablet 90 Tablet 4     Sig: Take 1 Tablet by mouth daily.

## 2022-06-26 RX ORDER — AMLODIPINE BESYLATE 10 MG/1
10 TABLET ORAL DAILY
Qty: 90 TABLET | Refills: 4 | Status: SHIPPED | OUTPATIENT
Start: 2022-06-26

## 2022-07-11 DIAGNOSIS — E78.2 MIXED HYPERLIPIDEMIA: ICD-10-CM

## 2022-07-11 RX ORDER — ROSUVASTATIN CALCIUM 10 MG/1
TABLET, COATED ORAL
Qty: 90 TABLET | Refills: 2 | Status: SHIPPED | OUTPATIENT
Start: 2022-07-11

## 2022-08-15 RX ORDER — METFORMIN HYDROCHLORIDE 500 MG/1
TABLET ORAL
Qty: 90 TABLET | Refills: 1 | Status: SHIPPED | OUTPATIENT
Start: 2022-08-15

## 2022-09-09 DIAGNOSIS — I10 ESSENTIAL HYPERTENSION: ICD-10-CM

## 2022-09-09 RX ORDER — METOPROLOL TARTRATE 25 MG/1
TABLET, FILM COATED ORAL
Qty: 180 TABLET | Refills: 1 | Status: SHIPPED | OUTPATIENT
Start: 2022-09-09

## 2022-09-21 ENCOUNTER — OFFICE VISIT (OUTPATIENT)
Dept: INTERNAL MEDICINE CLINIC | Age: 67
End: 2022-09-21
Payer: MEDICARE

## 2022-09-21 VITALS
RESPIRATION RATE: 16 BRPM | WEIGHT: 193.2 LBS | DIASTOLIC BLOOD PRESSURE: 65 MMHG | OXYGEN SATURATION: 100 % | HEART RATE: 69 BPM | TEMPERATURE: 97.3 F | SYSTOLIC BLOOD PRESSURE: 132 MMHG | BODY MASS INDEX: 32.19 KG/M2 | HEIGHT: 65 IN

## 2022-09-21 DIAGNOSIS — Z12.31 BREAST CANCER SCREENING BY MAMMOGRAM: ICD-10-CM

## 2022-09-21 DIAGNOSIS — I10 ESSENTIAL HYPERTENSION: ICD-10-CM

## 2022-09-21 DIAGNOSIS — E66.9 OBESITY (BMI 30-39.9): ICD-10-CM

## 2022-09-21 DIAGNOSIS — E11.9 TYPE 2 DIABETES MELLITUS WITHOUT COMPLICATION, WITHOUT LONG-TERM CURRENT USE OF INSULIN (HCC): Primary | ICD-10-CM

## 2022-09-21 LAB — HBA1C MFR BLD HPLC: 6.6 %

## 2022-09-21 PROCEDURE — 2022F DILAT RTA XM EVC RTNOPTHY: CPT | Performed by: FAMILY MEDICINE

## 2022-09-21 PROCEDURE — 83036 HEMOGLOBIN GLYCOSYLATED A1C: CPT | Performed by: FAMILY MEDICINE

## 2022-09-21 PROCEDURE — G9899 SCRN MAM PERF RSLTS DOC: HCPCS | Performed by: FAMILY MEDICINE

## 2022-09-21 PROCEDURE — G8427 DOCREV CUR MEDS BY ELIG CLIN: HCPCS | Performed by: FAMILY MEDICINE

## 2022-09-21 PROCEDURE — 3017F COLORECTAL CA SCREEN DOC REV: CPT | Performed by: FAMILY MEDICINE

## 2022-09-21 PROCEDURE — 1090F PRES/ABSN URINE INCON ASSESS: CPT | Performed by: FAMILY MEDICINE

## 2022-09-21 PROCEDURE — G0463 HOSPITAL OUTPT CLINIC VISIT: HCPCS | Performed by: FAMILY MEDICINE

## 2022-09-21 PROCEDURE — G8754 DIAS BP LESS 90: HCPCS | Performed by: FAMILY MEDICINE

## 2022-09-21 PROCEDURE — 3044F HG A1C LEVEL LT 7.0%: CPT | Performed by: FAMILY MEDICINE

## 2022-09-21 PROCEDURE — G8536 NO DOC ELDER MAL SCRN: HCPCS | Performed by: FAMILY MEDICINE

## 2022-09-21 PROCEDURE — G8752 SYS BP LESS 140: HCPCS | Performed by: FAMILY MEDICINE

## 2022-09-21 PROCEDURE — 99214 OFFICE O/P EST MOD 30 MIN: CPT | Performed by: FAMILY MEDICINE

## 2022-09-21 PROCEDURE — 1101F PT FALLS ASSESS-DOCD LE1/YR: CPT | Performed by: FAMILY MEDICINE

## 2022-09-21 PROCEDURE — G8417 CALC BMI ABV UP PARAM F/U: HCPCS | Performed by: FAMILY MEDICINE

## 2022-09-21 PROCEDURE — G8399 PT W/DXA RESULTS DOCUMENT: HCPCS | Performed by: FAMILY MEDICINE

## 2022-09-21 PROCEDURE — G8432 DEP SCR NOT DOC, RNG: HCPCS | Performed by: FAMILY MEDICINE

## 2022-09-21 RX ORDER — BLOOD SUGAR DIAGNOSTIC
STRIP MISCELLANEOUS
Qty: 100 STRIP | Refills: 11 | Status: SHIPPED | OUTPATIENT
Start: 2022-09-21

## 2022-09-21 NOTE — PROGRESS NOTES
1. \"Have you been to the ER, urgent care clinic since your last visit? Hospitalized since your last visit? \" No    2. \"Have you seen or consulted any other health care providers outside of the [address] [place] [address] since your last visit? \" No     3. For patients aged 39-70: Has the patient had a colonoscopy / FIT/ Cologuard? Yes - no Care Gap present      If the patient is female:    4. For patients aged 41-77: Has the patient had a mammogram within the past 2 years? Yes - no Care Gap present      5. For patients aged 21-65: Has the patient had a pap smear?  NA - based on age or sex

## 2022-09-21 NOTE — PROGRESS NOTES
SUBJECTIVE:   Ms. Madhav Salomon is a 79 y.o. female who is here for follow up of routine medical issues. Hypertenstion :Pt's BP today was 132/65. She reports yesterday it was 121/73, and generally remains well controlled. The patients has gained 6 pounds since her last visit, but states that exercises 4-5 times a week for one hour. Her exercises include walking, marching in place, and occasional weight lifting. T2DM: She denies significant changes to her diet, but notes eating popcorn frequently. She denies drinking juices, and hydrates well with water. Her A1C was 6.6 in May. She is taking Metformin 500mg daily. Pt denies changes in sleep, changes in bowel movement, difficulty urinating    SHINGLES: not interested  COVID: not interested in booster  PNEUMONIA: not interested  EYE EXAM: scheduled 09/30/22  MAMMOGRAM: scheduled        At this time, she is otherwise doing well and has brought no other complaints to my attention today. For a list of the medical issues addressed today, see the assessment and plan below. PMH:   Past Medical History:   Diagnosis Date    Diabetes ([facility] [address]     Hypercholesterolemia     Hypertension     Murmur     Other ill-defined conditions(799.89)     high cholesterol     PSH:  has a past surgical history that includes hx tubal ligation; hx orthopaedic; hx rotator cuff repair (10/2017); and hx hysterectomy (3/25/99). All: is allergic to percocet [oxycodone-acetaminophen]. MEDS:   Current Outpatient Medications   Medication Sig    glucose blood VI test strips (OneTouch Verio test strips) strip USE AS DIRECTED TWICE DAILY    metoprolol tartrate (LOPRESSOR) 25 mg tablet TAKE 1 TABLET BY MOUTH TWICE A DAY    metFORMIN (GLUCOPHAGE) 500 mg tablet TAKE 1 TABLET BY MOUTH EVERY DAY    rosuvastatin (CRESTOR) 10 mg tablet TAKE 1 TAB BY MOUTH EVERY EVENING    amLODIPine (NORVASC) 10 mg tablet Take 1 Tablet by mouth daily.     hydroCHLOROthiazide (HYDRODIURIL) 25 mg tablet Take 1 Tablet by mouth daily. lisinopriL (PRINIVIL, ZESTRIL) 40 mg tablet TAKE 1 TABLET BY MOUTH EVERY DAY    Blood-Glucose Meter monitoring kit Check glucose daily. mv-min/iron/folic/calcium/vitK (WOMEN'S MULTIVITAMIN PO) Take  by mouth. Blood-Glucose Meter misc One Touch Ultra. Use as directed to check blood sugars twice daily    omega-3 fatty acids-vitamin e 1,000 mg cap Take 1 Cap by mouth. No current facility-administered medications for this visit. FH: family history includes Breast Cancer in her paternal aunt; Cancer in her father; Cancer (age of onset: 64) in her mother; Diabetes in her sister; Hypertension in her brother, brother, father, mother, sister, sister, and sister. SH:  reports that she quit smoking about 32 years ago. Her smoking use included cigarettes. She has never used smokeless tobacco. She reports current alcohol use. She reports that she does not use drugs. Review of Systems - History obtained from the patient  General ROS: negative  Psychological ROS: negative  Ophthalmic ROS: negative  ENT ROS: negative  Respiratory ROS: no cough, shortness of breath, or wheezing  Cardiovascular ROS: no chest pain or dyspnea on exertion  Gastrointestinal ROS: no abdominal pain, change in bowel habits, or black or bloody stools  Genito-Urinary ROS: negative  Musculoskeletal ROS: negative  Neurological ROS: negative  Dermatological ROS: negative    OBJECTIVE:   Vitals: Visit Vitals  /65 (BP 1 Location: Right arm, BP Patient Position: Sitting, BP Cuff Size: Small adult)   Pulse 69   Temp 97.3 °F (36.3 °C) (Temporal)   Resp 16   Ht 5' 5\" (1.651 m)   Wt 193 lb 3.2 oz (87.6 kg)   LMP 03/25/1999   SpO2 100%   BMI 32.15 kg/m²      Gen: Pleasant 79 y.o.  female in NAD. HEENT: PERRLA. EOMI. OP moist and pink. Neck: Supple. No LAD. HEART: RRR, No M/G/R.      LUNGS: CTAB No W/R. ABDOMEN: S, NT, ND, BS+. EXTREMITIES: Warm.  No C/C/E.    MUSCULOSKELETAL: Normal ROM, muscle strength 5/5 all groups. NEURO: Alert and oriented x 3. Cranial nerves grossly intact. No focal sensory or motor deficits noted. SKIN: Warm. Dry. No rashes or other lesions noted. ASSESSMENT/ PLAN:     1. Type 2 diabetes mellitus without complication, without long-term current use of insulin (HCC)  I refilled her supplies to measure her blood glucose. I have also ordered labs for her A1C. We also discussed injectible medications, and she stated that she will consider them. -     glucose blood VI test strips (OneTouch Verio test strips) strip; USE AS DIRECTED TWICE DAILY  -     AMB POC HEMOGLOBIN A1C    2. Essential hypertension  She will continue to exercise regularly and monitor her BP.  3. Obesity (BMI 30-39. 9)  I advised her to reduce her popcorn intake and to generally follow healthier eating habits. She was also encouraged to incorporate resistance training into her workouts    4. Breast cancer screening by mammogram  I ordered a mammogram for her. -     DEVIN 3D RIC W MAMMO BI SCREENING INCL CAD; Future      ICD-10-CM ICD-9-CM    1. Type 2 diabetes mellitus without complication, without long-term current use of insulin (HCC)  E11.9 250.00 glucose blood VI test strips (OneTouch Verio test strips) strip      AMB POC HEMOGLOBIN A1C      2. Essential hypertension  I10 401.9       3. Obesity (BMI 30-39. 9)  E66.9 278.00       4. Breast cancer screening by mammogram  Z12.31 V76.12 DEVIN 3D RIC W MAMMO BI SCREENING INCL CAD           Follow-up and Dispositions    Return in about 6 months (around 3/21/2023) for follow up and 3 mo check A1c. I have reviewed the patient's medications and risks/side effects/benefits were discussed. Diagnosis(-es) explained to patient and questions answered. Literature provided where appropriate.       Written by Mary Mcmanus, as dictated by Wing Greyson MD

## 2022-11-28 NOTE — PROGRESS NOTES
1. Have you been to the ER, urgent care clinic since your last visit? Hospitalized since your last visit? No    2. Have you seen or consulted any other health care providers outside of the [address] [place] [address] since your last visit? Include any pap smears or colon screening.  No         Chief Complaint   Patient presents with    Hypertension     C/O  Headaches, Ankle swelling Chronic  - Meds: Hydralazine 50 mg q8h and losartan 50 q12h  - Continue holding home BP meds as pt is within target BP range

## 2022-12-05 RX ORDER — AMOXICILLIN 500 MG/1
500 CAPSULE ORAL ONCE
Qty: 4 CAPSULE | Refills: 1 | Status: SHIPPED | OUTPATIENT
Start: 2022-12-05 | End: 2022-12-05

## 2022-12-05 NOTE — TELEPHONE ENCOUNTER
Spoke with patient, she believes Dr. Vishal Escudero suggested she take this pre dental appt due to heart condition      PCP: Terra Suggs MD    Last appt: 9/21/2022  Future Appointments   Date Time Provider Gerald Kusum   12/20/2022  9:20 AM LDC DEVIN [facility] IM       Requested Prescriptions     Pending Prescriptions Disp Refills    amoxicillin (AMOXIL) 500 mg capsule 4 Capsule 1     Sig: Take 1 Capsule by mouth once for 1 dose.

## 2022-12-05 NOTE — TELEPHONE ENCOUNTER
Patient states she needs a refill on her Amoxicillin for her upcoming Dental/Teeth Cleaning on 12/21/22. Please call if any questions. Thank you        Pharmacy is [facility] on file.

## 2022-12-06 RX ORDER — LISINOPRIL 40 MG/1
TABLET ORAL
Qty: 90 TABLET | Refills: 2 | Status: SHIPPED | OUTPATIENT
Start: 2022-12-06

## 2022-12-22 RX ORDER — HYDROCHLOROTHIAZIDE 25 MG/1
TABLET ORAL
Qty: 90 TABLET | Refills: 2 | Status: SHIPPED | OUTPATIENT
Start: 2022-12-22

## 2023-02-03 ENCOUNTER — HOSPITAL ENCOUNTER (OUTPATIENT)
Dept: MAMMOGRAPHY | Age: 68
Discharge: HOME | End: 2023-02-03
Attending: FAMILY MEDICINE
Payer: MEDICARE

## 2023-02-03 DIAGNOSIS — Z12.31 BREAST CANCER SCREENING BY MAMMOGRAM: ICD-10-CM

## 2023-02-03 PROCEDURE — 77063 BREAST TOMOSYNTHESIS BI: CPT

## 2023-02-17 RX ORDER — METFORMIN HYDROCHLORIDE 500 MG/1
TABLET ORAL
Qty: 90 TABLET | Refills: 1 | Status: SHIPPED | OUTPATIENT
Start: 2023-02-17

## 2023-03-01 ENCOUNTER — OFFICE VISIT (OUTPATIENT)
Dept: INTERNAL MEDICINE CLINIC | Age: 68
End: 2023-03-01
Payer: MEDICARE

## 2023-03-01 VITALS
OXYGEN SATURATION: 97 % | RESPIRATION RATE: 16 BRPM | HEART RATE: 72 BPM | BODY MASS INDEX: 30.99 KG/M2 | HEIGHT: 65 IN | SYSTOLIC BLOOD PRESSURE: 132 MMHG | DIASTOLIC BLOOD PRESSURE: 78 MMHG | TEMPERATURE: 97.1 F | WEIGHT: 186 LBS

## 2023-03-01 DIAGNOSIS — I10 PRIMARY HYPERTENSION: Primary | ICD-10-CM

## 2023-03-01 DIAGNOSIS — E11.9 TYPE 2 DIABETES MELLITUS WITHOUT COMPLICATION, WITHOUT LONG-TERM CURRENT USE OF INSULIN (HCC): ICD-10-CM

## 2023-03-01 DIAGNOSIS — Z12.4 CERVICAL CANCER SCREENING: ICD-10-CM

## 2023-03-01 PROCEDURE — G8510 SCR DEP NEG, NO PLAN REQD: HCPCS | Performed by: FAMILY MEDICINE

## 2023-03-01 PROCEDURE — 1101F PT FALLS ASSESS-DOCD LE1/YR: CPT | Performed by: FAMILY MEDICINE

## 2023-03-01 PROCEDURE — 99213 OFFICE O/P EST LOW 20 MIN: CPT | Performed by: FAMILY MEDICINE

## 2023-03-01 PROCEDURE — 3017F COLORECTAL CA SCREEN DOC REV: CPT | Performed by: FAMILY MEDICINE

## 2023-03-01 PROCEDURE — G8417 CALC BMI ABV UP PARAM F/U: HCPCS | Performed by: FAMILY MEDICINE

## 2023-03-01 PROCEDURE — G8399 PT W/DXA RESULTS DOCUMENT: HCPCS | Performed by: FAMILY MEDICINE

## 2023-03-01 PROCEDURE — G9899 SCRN MAM PERF RSLTS DOC: HCPCS | Performed by: FAMILY MEDICINE

## 2023-03-01 PROCEDURE — G8536 NO DOC ELDER MAL SCRN: HCPCS | Performed by: FAMILY MEDICINE

## 2023-03-01 PROCEDURE — 3046F HEMOGLOBIN A1C LEVEL >9.0%: CPT | Performed by: FAMILY MEDICINE

## 2023-03-01 PROCEDURE — G0463 HOSPITAL OUTPT CLINIC VISIT: HCPCS | Performed by: FAMILY MEDICINE

## 2023-03-01 PROCEDURE — 1090F PRES/ABSN URINE INCON ASSESS: CPT | Performed by: FAMILY MEDICINE

## 2023-03-01 PROCEDURE — G8427 DOCREV CUR MEDS BY ELIG CLIN: HCPCS | Performed by: FAMILY MEDICINE

## 2023-03-01 PROCEDURE — 2022F DILAT RTA XM EVC RTNOPTHY: CPT | Performed by: FAMILY MEDICINE

## 2023-03-01 NOTE — PROGRESS NOTES
SUBJECTIVE:   Ms. Mikaela Parekh is a 79 y.o. female who is here for follow up of routine medical issues. HTN: Pt is compliant in taking HCTZ 25 mg, lisinopril 40 mg, metoprolol tartrate 25 mg, amlodipine 10 mg. Pt's BP in the office today was 132/78. Patient denies chest pain, VALDEZ/SOB, edema, headache, visual changes, dizziness, palpitations or syncope. DM: She notes monitoring her glucose and reports it to have been 126 this morning. Her POC hemoglobin A1c from 9/21/22 was 6.6. Pt continues to take metformin 500 mg. Pt has lost 7 lbs from 193 lbs on 9/21/2022 to 186 lbs today. She reports monitoring her diet and reducing popcorn intake. She also notes exercising. At this time, she is otherwise doing well and has brought no other complaints to my attention today. For a list of the medical issues addressed today, see the assessment and plan below. PMH:   Past Medical History:   Diagnosis Date    Diabetes ([facility] [address]     Hypercholesterolemia     Hypertension     Murmur     Other ill-defined conditions(799.89)     high cholesterol     PSH:  has a past surgical history that includes hx tubal ligation; hx orthopaedic; hx rotator cuff repair (10/2017); and hx hysterectomy (3/25/99). All: is allergic to percocet [oxycodone-acetaminophen]. MEDS:   Current Outpatient Medications   Medication Sig    metFORMIN (GLUCOPHAGE) 500 mg tablet TAKE 1 TABLET BY MOUTH EVERY DAY    hydroCHLOROthiazide (HYDRODIURIL) 25 mg tablet TAKE 1 TABLET BY MOUTH EVERY DAY    lisinopriL (PRINIVIL, ZESTRIL) 40 mg tablet TAKE 1 TABLET BY MOUTH EVERY DAY    glucose blood VI test strips (OneTouch Verio test strips) strip USE AS DIRECTED TWICE DAILY    metoprolol tartrate (LOPRESSOR) 25 mg tablet TAKE 1 TABLET BY MOUTH TWICE A DAY    rosuvastatin (CRESTOR) 10 mg tablet TAKE 1 TAB BY MOUTH EVERY EVENING    amLODIPine (NORVASC) 10 mg tablet Take 1 Tablet by mouth daily. Blood-Glucose Meter monitoring kit Check glucose daily. mv-min/iron/folic/calcium/vitK (WOMEN'S MULTIVITAMIN PO) Take  by mouth. Blood-Glucose Meter misc One Touch Ultra. Use as directed to check blood sugars twice daily    omega-3 fatty acids-vitamin e 1,000 mg cap Take 1 Cap by mouth. No current facility-administered medications for this visit. FH: family history includes Breast Cancer in her paternal aunt; Cancer in her father; Cancer (age of onset: 64) in her mother; Diabetes in her sister; Hypertension in her brother, brother, father, mother, sister, sister, and sister. SH:  reports that she quit smoking about 32 years ago. Her smoking use included cigarettes. She has never used smokeless tobacco. She reports current alcohol use. She reports that she does not use drugs. Review of Systems - History obtained from the patient  General ROS: no fever, chills, fatigue, body aches  Psychological ROS: no change in anxiety, depression, SI/HI  Ophthalmic ROS: no blurred vision, myopia, double vision  ENT ROS: no dysphagia, otalgia, otorrhea, rhinorrhea, post nasal drip  Respiratory ROS: no cough, shortness of breath, or wheezing  Cardiovascular ROS: no chest pain or dyspnea on exertion  Gastrointestinal ROS: no abdominal pain, change in bowel habits, or black or bloody stools  Genito-Urinary ROS: no frequency, urgency, incontinence, dysuria, hematouria  Musculoskeletal ROS: no arthralagia, myalgia  Neurological ROS: no headaches, dizziness, lightheadedness, tremors, seizures  Dermatological ROS: no rash or lesions    OBJECTIVE:   Vitals: Visit Vitals  /78 (BP 1 Location: Left upper arm, BP Patient Position: Sitting, BP Cuff Size: Adult)   Pulse 72   Temp 97.1 °F (36.2 °C) (Temporal)   Resp 16   Ht 5' 5\" (1.651 m)   Wt 186 lb (84.4 kg)   LMP 03/25/1999   SpO2 97%   BMI 30.95 kg/m²      Gen: Pleasant 79 y.o.  female in NAD. HEENT: PERRLA. EOMI. OP moist and pink. Neck: Supple. No LAD. HEART: RRR, No M/G/R.      LUNGS: CTAB No W/R. EXTREMITIES: Warm. No C/C/E.    MUSCULOSKELETAL: Normal ROM, muscle strength 5/5 all groups. NEURO: Alert and oriented x 3. Cranial nerves grossly intact. No focal sensory or motor deficits noted. SKIN: Warm. Dry. No rashes or other lesions noted. ASSESSMENT/ PLAN: Diagnoses and all orders for this visit:    1. Primary hypertension  -     METABOLIC PANEL, COMPREHENSIVE; Future  -     CBC WITH AUTOMATED DIFF; Future    2. Type 2 diabetes mellitus without complication, without long-term current use of insulin (HCC)  -     METABOLIC PANEL, COMPREHENSIVE; Future  -     HEMOGLOBIN A1C WITH EAG; Future    3. Cervical cancer screening  -     REFERRAL TO OBSTETRICS AND GYNECOLOGY    1. Primary hypertension  BP seems to be well controlled. I recommended continuing current dose of medications, eating a low sodium diet, and increasing exercise. Recheck CMP and CBC.     -     METABOLIC PANEL, COMPREHENSIVE; Future  -     CBC WITH AUTOMATED DIFF; Future    2. Type 2 diabetes mellitus without complication, without long-term current use of insulin (HCC)  Pt's blood sugar seems to be well controlled. I advised pt to continue current dose of medications, avoid sugars and starches, and to increase exercise when possible. Recheck hemoglobin A1c and CMP.     -     METABOLIC PANEL, COMPREHENSIVE; Future  -     HEMOGLOBIN A1C WITH EAG; Future    3. Cervical cancer screening  Pt was interested in a referral to OBGYN for her well woman check, so I placed one.   -     REFERRAL TO OBSTETRICS AND GYNECOLOGY        Follow-up and Dispositions    Return in about 6 months (around 9/1/2023) for follow up. I have reviewed the patient's medications and risks/side effects/benefits were discussed. Diagnosis(-es) explained to patient and questions answered. Literature provided where appropriate.      Written by Simba Conner, as dictated by Faheem Agudelo MD.

## 2023-03-01 NOTE — PROGRESS NOTES
1. \"Have you been to the ER, urgent care clinic since your last visit? Hospitalized since your last visit? \" No    2. \"Have you seen or consulted any other health care providers outside of the [address] [place] [address] since your last visit? \" No     3. For patients aged 39-70: Has the patient had a colonoscopy / FIT/ Cologuard? Yes - no Care Gap present      If the patient is female:    4. For patients aged 41-77: Has the patient had a mammogram within the past 2 years? Yes - no Care Gap present      5. For patients aged 21-65: Has the patient had a pap smear?  NA - based on age or sex

## 2023-03-03 ENCOUNTER — APPOINTMENT (OUTPATIENT)
Dept: INTERNAL MEDICINE CLINIC | Age: 68
End: 2023-03-03

## 2023-03-03 DIAGNOSIS — I10 PRIMARY HYPERTENSION: ICD-10-CM

## 2023-03-03 DIAGNOSIS — I10 ESSENTIAL HYPERTENSION: ICD-10-CM

## 2023-03-03 DIAGNOSIS — E11.9 TYPE 2 DIABETES MELLITUS WITHOUT COMPLICATION, WITHOUT LONG-TERM CURRENT USE OF INSULIN (HCC): ICD-10-CM

## 2023-03-03 RX ORDER — METOPROLOL TARTRATE 25 MG/1
TABLET, FILM COATED ORAL
Qty: 180 TABLET | Refills: 1 | Status: SHIPPED | OUTPATIENT
Start: 2023-03-03

## 2023-03-04 LAB
ALBUMIN SERPL-MCNC: 4.1 G/DL (ref 3.5–5)
ALBUMIN/GLOB SERPL: 1.5 (ref 1.1–2.2)
ALP SERPL-CCNC: 74 U/L (ref 45–117)
ALT SERPL-CCNC: 31 U/L (ref 12–78)
ANION GAP SERPL CALC-SCNC: 5 MMOL/L (ref 5–15)
AST SERPL-CCNC: 20 U/L (ref 15–37)
BASOPHILS # BLD: 0 K/UL (ref 0–0.1)
BASOPHILS NFR BLD: 1 % (ref 0–1)
BILIRUB SERPL-MCNC: 0.4 MG/DL (ref 0.2–1)
BUN SERPL-MCNC: 16 MG/DL (ref 6–20)
BUN/CREAT SERPL: 22 (ref 12–20)
CALCIUM SERPL-MCNC: 9.5 MG/DL (ref 8.5–10.1)
CHLORIDE SERPL-SCNC: 107 MMOL/L (ref 97–108)
CO2 SERPL-SCNC: 30 MMOL/L (ref 21–32)
CREAT SERPL-MCNC: 0.74 MG/DL (ref 0.55–1.02)
DIFFERENTIAL METHOD BLD: ABNORMAL
EOSINOPHIL # BLD: 0.1 K/UL (ref 0–0.4)
EOSINOPHIL NFR BLD: 4 % (ref 0–7)
ERYTHROCYTE [DISTWIDTH] IN BLOOD BY AUTOMATED COUNT: 16.3 % (ref 11.5–14.5)
EST. AVERAGE GLUCOSE BLD GHB EST-MCNC: 143 MG/DL
GLOBULIN SER CALC-MCNC: 2.7 G/DL (ref 2–4)
GLUCOSE SERPL-MCNC: 109 MG/DL (ref 65–100)
HBA1C MFR BLD: 6.6 % (ref 4–5.6)
HCT VFR BLD AUTO: 40 % (ref 35–47)
HGB BLD-MCNC: 12.3 G/DL (ref 11.5–16)
IMM GRANULOCYTES # BLD AUTO: 0 K/UL (ref 0–0.04)
IMM GRANULOCYTES NFR BLD AUTO: 0 % (ref 0–0.5)
LYMPHOCYTES # BLD: 1.4 K/UL (ref 0.8–3.5)
LYMPHOCYTES NFR BLD: 45 % (ref 12–49)
MCH RBC QN AUTO: 25.4 PG (ref 26–34)
MCHC RBC AUTO-ENTMCNC: 30.8 G/DL (ref 30–36.5)
MCV RBC AUTO: 82.6 FL (ref 80–99)
MONOCYTES # BLD: 0.3 K/UL (ref 0–1)
MONOCYTES NFR BLD: 11 % (ref 5–13)
NEUTS SEG # BLD: 1.3 K/UL (ref 1.8–8)
NEUTS SEG NFR BLD: 39 % (ref 32–75)
NRBC # BLD: 0 K/UL (ref 0–0.01)
NRBC BLD-RTO: 0 PER 100 WBC
PLATELET # BLD AUTO: 273 K/UL (ref 150–400)
PMV BLD AUTO: 10.9 FL (ref 8.9–12.9)
POTASSIUM SERPL-SCNC: 4 MMOL/L (ref 3.5–5.1)
PROT SERPL-MCNC: 6.8 G/DL (ref 6.4–8.2)
RBC # BLD AUTO: 4.84 M/UL (ref 3.8–5.2)
SODIUM SERPL-SCNC: 142 MMOL/L (ref 136–145)
WBC # BLD AUTO: 3.2 K/UL (ref 3.6–11)

## 2023-06-09 DIAGNOSIS — E78.2 MIXED HYPERLIPIDEMIA: ICD-10-CM

## 2023-06-10 RX ORDER — ROSUVASTATIN CALCIUM 10 MG/1
TABLET, COATED ORAL
Qty: 90 TABLET | Refills: 2 | Status: SHIPPED | OUTPATIENT
Start: 2023-06-10

## 2023-07-10 ENCOUNTER — TELEPHONE (OUTPATIENT)
Age: 68
End: 2023-07-10

## 2023-07-10 NOTE — TELEPHONE ENCOUNTER
Regarding medication:  Meloxicam 15 mg, prescribed by Dr Keke Dominique Podiatrist    Problem:  States read up on med and it lists that she should not take it if she also takes other meds, of which she takes for htn. Worried about the side affects of the interactions. Request:  Asks for advice on what dr thinks      Eddie Cisneros number:  [phone]            Caller confirms readback of documented phone/fax number(s) as correct.

## 2023-07-13 NOTE — TELEPHONE ENCOUNTER
The patient reported Jack Post, Podiatry prescribed Mobic 15mg. She wanted to know if she take take it with her other meds.     Please advise, thank you, Cheryle Ewings

## 2023-07-13 NOTE — TELEPHONE ENCOUNTER
Pt is calling again today as she has not had a call back pertaining to the medication question. Please call number listed.  #[phone] (in chart)

## 2023-07-14 NOTE — TELEPHONE ENCOUNTER
I advised the patient per , \"No. She is taking HCTZ and lisinopril. \"Pt verbalized understanding of information discussed w/ no further questions at this time.

## 2023-08-30 DIAGNOSIS — I10 ESSENTIAL (PRIMARY) HYPERTENSION: ICD-10-CM

## 2023-09-12 DIAGNOSIS — I10 ESSENTIAL (PRIMARY) HYPERTENSION: ICD-10-CM

## 2023-09-13 RX ORDER — LISINOPRIL 40 MG/1
TABLET ORAL
Qty: 90 TABLET | Refills: 2 | Status: SHIPPED | OUTPATIENT
Start: 2023-09-13

## 2023-09-13 RX ORDER — HYDROCHLOROTHIAZIDE 25 MG/1
TABLET ORAL
Qty: 90 TABLET | Refills: 2 | Status: SHIPPED | OUTPATIENT
Start: 2023-09-13

## 2023-09-13 RX ORDER — AMLODIPINE BESYLATE 10 MG/1
10 TABLET ORAL DAILY
Qty: 90 TABLET | Refills: 4 | Status: SHIPPED | OUTPATIENT
Start: 2023-09-13

## 2023-10-28 SDOH — ECONOMIC STABILITY: FOOD INSECURITY: WITHIN THE PAST 12 MONTHS, YOU WORRIED THAT YOUR FOOD WOULD RUN OUT BEFORE YOU GOT MONEY TO BUY MORE.: NEVER TRUE

## 2023-10-28 SDOH — ECONOMIC STABILITY: INCOME INSECURITY: HOW HARD IS IT FOR YOU TO PAY FOR THE VERY BASICS LIKE FOOD, HOUSING, MEDICAL CARE, AND HEATING?: NOT HARD AT ALL

## 2023-10-28 SDOH — ECONOMIC STABILITY: HOUSING INSECURITY
IN THE LAST 12 MONTHS, WAS THERE A TIME WHEN YOU DID NOT HAVE A STEADY PLACE TO SLEEP OR SLEPT IN A SHELTER (INCLUDING NOW)?: NO

## 2023-10-28 SDOH — ECONOMIC STABILITY: TRANSPORTATION INSECURITY
IN THE PAST 12 MONTHS, HAS LACK OF TRANSPORTATION KEPT YOU FROM MEETINGS, WORK, OR FROM GETTING THINGS NEEDED FOR DAILY LIVING?: NO

## 2023-10-28 SDOH — ECONOMIC STABILITY: FOOD INSECURITY: WITHIN THE PAST 12 MONTHS, THE FOOD YOU BOUGHT JUST DIDN'T LAST AND YOU DIDN'T HAVE MONEY TO GET MORE.: NEVER TRUE

## 2023-10-30 ENCOUNTER — OFFICE VISIT (OUTPATIENT)
Age: 68
End: 2023-10-30
Payer: MEDICARE

## 2023-10-30 VITALS
HEIGHT: 65 IN | HEART RATE: 74 BPM | SYSTOLIC BLOOD PRESSURE: 120 MMHG | DIASTOLIC BLOOD PRESSURE: 71 MMHG | WEIGHT: 189 LBS | BODY MASS INDEX: 31.49 KG/M2 | RESPIRATION RATE: 19 BRPM | TEMPERATURE: 97.3 F | OXYGEN SATURATION: 98 %

## 2023-10-30 DIAGNOSIS — I10 ESSENTIAL (PRIMARY) HYPERTENSION: ICD-10-CM

## 2023-10-30 DIAGNOSIS — Z00.00 MEDICARE ANNUAL WELLNESS VISIT, SUBSEQUENT: Primary | ICD-10-CM

## 2023-10-30 DIAGNOSIS — I10 PRIMARY HYPERTENSION: ICD-10-CM

## 2023-10-30 DIAGNOSIS — E78.2 MIXED HYPERLIPIDEMIA: ICD-10-CM

## 2023-10-30 DIAGNOSIS — H53.9 VISION CHANGES: ICD-10-CM

## 2023-10-30 DIAGNOSIS — Z78.0 POST-MENOPAUSAL: ICD-10-CM

## 2023-10-30 DIAGNOSIS — E11.9 TYPE 2 DIABETES MELLITUS WITHOUT COMPLICATION, WITHOUT LONG-TERM CURRENT USE OF INSULIN (HCC): ICD-10-CM

## 2023-10-30 PROCEDURE — 3017F COLORECTAL CA SCREEN DOC REV: CPT | Performed by: FAMILY MEDICINE

## 2023-10-30 PROCEDURE — G8483 FLU IMM NO ADMIN DOC REA: HCPCS | Performed by: FAMILY MEDICINE

## 2023-10-30 PROCEDURE — 3074F SYST BP LT 130 MM HG: CPT | Performed by: FAMILY MEDICINE

## 2023-10-30 PROCEDURE — G0439 PPPS, SUBSEQ VISIT: HCPCS | Performed by: FAMILY MEDICINE

## 2023-10-30 PROCEDURE — 3044F HG A1C LEVEL LT 7.0%: CPT | Performed by: FAMILY MEDICINE

## 2023-10-30 PROCEDURE — 3078F DIAST BP <80 MM HG: CPT | Performed by: FAMILY MEDICINE

## 2023-10-30 PROCEDURE — 1123F ACP DISCUSS/DSCN MKR DOCD: CPT | Performed by: FAMILY MEDICINE

## 2023-10-30 RX ORDER — GLUCOSAMINE HCL/CHONDROITIN SU 500-400 MG
CAPSULE ORAL
Qty: 100 STRIP | Refills: 11 | Status: SHIPPED | OUTPATIENT
Start: 2023-10-30

## 2023-10-30 ASSESSMENT — PATIENT HEALTH QUESTIONNAIRE - PHQ9
SUM OF ALL RESPONSES TO PHQ9 QUESTIONS 1 & 2: 0
SUM OF ALL RESPONSES TO PHQ QUESTIONS 1-9: 0
1. LITTLE INTEREST OR PLEASURE IN DOING THINGS: 0
2. FEELING DOWN, DEPRESSED OR HOPELESS: 0
SUM OF ALL RESPONSES TO PHQ QUESTIONS 1-9: 0

## 2023-10-30 ASSESSMENT — LIFESTYLE VARIABLES
HOW MANY STANDARD DRINKS CONTAINING ALCOHOL DO YOU HAVE ON A TYPICAL DAY: 1 OR 2
HOW OFTEN DO YOU HAVE A DRINK CONTAINING ALCOHOL: NEVER

## 2023-10-30 NOTE — PROGRESS NOTES
1. \"Have you been to the ER, urgent care clinic since your last visit? Hospitalized since your last visit? \" No    2. \"Have you seen or consulted any other health care providers outside of the [address] [place] [address] since your last visit? \" No     3. For patients aged 43-73: Has the patient had a colonoscopy / FIT/ Cologuard? Yes - no Care Gap present      If the patient is female:    4. For patients aged 43-66: Has the patient had a mammogram within the past 2 years? Yes - no Care Gap present      5. For patients aged 21-65: Has the patient had a pap smear?  NA - based on age or sex

## 2023-10-30 NOTE — PROGRESS NOTES
SUBJECTIVE:   Ms. Kevin Valladares is a 76 y.o. female who is here for [facility] Annual Wellness Visit. HTN: Pt is compliant in taking HCTZ 25mg daily, lisinopril 40mg daily, amlodipine 10mg daily and metoprolol tartrate 25mg BID. Pt's BP in the office today was 120/71. Patient denies chest pain, WOLF/SOB, edema, headache, visual changes, dizziness, palpitations or syncope. HLD: Pt is compliant in taking Crestor 10mg nightly. Pt's lipid panel was completely normal when last checked on 5/27/2022. Total cholesterol was 170, triglycerides were 75, HDL was 71 and LDL was 84. Diabetes: Pt is compliant in taking metformin 500mg daily. Her HgbA1c was 6.6% when last checked on 3/3/2023. Pt is requesting a printed prescription for test strips. Vision changes: Pt saw Dr. Lilliana Garza (Optometry) at [facility] and was told she likely has macular degeneration. Pt cannot recall the exact name she was told. Pt wishes to see a specialist for further evaluation of this. AWV: Pt had a normal DEXA scan on 10/22/2020. She exercises 4-5 days/week for at least 60 minutes. She does resistance training once/week. She is currently working on putting together her advanced directives. Pt specifically denies changes in hearing, trouble with swallowing or taste, CP, SOB, heartburn or upset stomach, change in bowel habits, unusual joint or muscle pains, numbness or tingling in extremities, or skin lesions of concern. PREVENTIVE:  Mammogram: UTD (2/4/2023)  DEXA: due (2020)   Flu: pt declines    At this time, she is otherwise doing well and has brought no other complaints to my attention today. For a list of the medical issues addressed today, see the assessment and plan below.     PMH:   Past Medical History:   Diagnosis Date    Diabetes ([address])     Hypercholesterolemia     Hypertension     Murmur     Other ill-defined conditions(799.89)     high cholesterol     PSH:  has a past surgical history that includes

## 2023-10-31 LAB
ALBUMIN SERPL-MCNC: 4.3 G/DL (ref 3.5–5)
ALBUMIN/GLOB SERPL: 1.3 (ref 1.1–2.2)
ALP SERPL-CCNC: 82 U/L (ref 45–117)
ALT SERPL-CCNC: 41 U/L (ref 12–78)
ANION GAP SERPL CALC-SCNC: 5 MMOL/L (ref 5–15)
APPEARANCE UR: ABNORMAL
AST SERPL-CCNC: 23 U/L (ref 15–37)
BACTERIA URNS QL MICRO: ABNORMAL /HPF
BASOPHILS # BLD: 0 K/UL (ref 0–0.1)
BASOPHILS NFR BLD: 1 % (ref 0–1)
BILIRUB SERPL-MCNC: 0.4 MG/DL (ref 0.2–1)
BILIRUB UR QL: NEGATIVE
BUN SERPL-MCNC: 13 MG/DL (ref 6–20)
BUN/CREAT SERPL: 20 (ref 12–20)
CALCIUM SERPL-MCNC: 9.9 MG/DL (ref 8.5–10.1)
CHLORIDE SERPL-SCNC: 109 MMOL/L (ref 97–108)
CHOLEST SERPL-MCNC: 159 MG/DL
CO2 SERPL-SCNC: 31 MMOL/L (ref 21–32)
COLOR UR: ABNORMAL
CREAT SERPL-MCNC: 0.66 MG/DL (ref 0.55–1.02)
CREAT UR-MCNC: 280 MG/DL
DIFFERENTIAL METHOD BLD: ABNORMAL
EOSINOPHIL # BLD: 0.2 K/UL (ref 0–0.4)
EOSINOPHIL NFR BLD: 6 % (ref 0–7)
EPITH CASTS URNS QL MICRO: ABNORMAL /LPF
ERYTHROCYTE [DISTWIDTH] IN BLOOD BY AUTOMATED COUNT: 16.7 % (ref 11.5–14.5)
EST. AVERAGE GLUCOSE BLD GHB EST-MCNC: 137 MG/DL
GLOBULIN SER CALC-MCNC: 3.3 G/DL (ref 2–4)
GLUCOSE SERPL-MCNC: 107 MG/DL (ref 65–100)
GLUCOSE UR STRIP.AUTO-MCNC: NEGATIVE MG/DL
HBA1C MFR BLD: 6.4 % (ref 4–5.6)
HCT VFR BLD AUTO: 39.9 % (ref 35–47)
HDLC SERPL-MCNC: 65 MG/DL
HDLC SERPL: 2.4 (ref 0–5)
HGB BLD-MCNC: 12.1 G/DL (ref 11.5–16)
HGB UR QL STRIP: NEGATIVE
HYALINE CASTS URNS QL MICRO: >20 /LPF (ref 0–5)
IMM GRANULOCYTES # BLD AUTO: 0 K/UL (ref 0–0.04)
IMM GRANULOCYTES NFR BLD AUTO: 0 % (ref 0–0.5)
KETONES UR QL STRIP.AUTO: ABNORMAL MG/DL
LDLC SERPL CALC-MCNC: 79.2 MG/DL (ref 0–100)
LEUKOCYTE ESTERASE UR QL STRIP.AUTO: ABNORMAL
LYMPHOCYTES # BLD: 1.3 K/UL (ref 0.8–3.5)
LYMPHOCYTES NFR BLD: 38 % (ref 12–49)
MCH RBC QN AUTO: 25.5 PG (ref 26–34)
MCHC RBC AUTO-ENTMCNC: 30.3 G/DL (ref 30–36.5)
MCV RBC AUTO: 84.2 FL (ref 80–99)
MICROALBUMIN UR-MCNC: 16 MG/DL
MICROALBUMIN/CREAT UR-RTO: 57 MG/G (ref 0–30)
MONOCYTES # BLD: 0.3 K/UL (ref 0–1)
MONOCYTES NFR BLD: 10 % (ref 5–13)
NEUTS SEG # BLD: 1.5 K/UL (ref 1.8–8)
NEUTS SEG NFR BLD: 45 % (ref 32–75)
NITRITE UR QL STRIP.AUTO: NEGATIVE
NRBC # BLD: 0 K/UL (ref 0–0.01)
NRBC BLD-RTO: 0 PER 100 WBC
PH UR STRIP: 5.5 (ref 5–8)
PLATELET # BLD AUTO: 258 K/UL (ref 150–400)
PMV BLD AUTO: 10.9 FL (ref 8.9–12.9)
POTASSIUM SERPL-SCNC: 3.9 MMOL/L (ref 3.5–5.1)
PROT SERPL-MCNC: 7.6 G/DL (ref 6.4–8.2)
PROT UR STRIP-MCNC: 30 MG/DL
RBC # BLD AUTO: 4.74 M/UL (ref 3.8–5.2)
RBC #/AREA URNS HPF: ABNORMAL /HPF (ref 0–5)
SODIUM SERPL-SCNC: 145 MMOL/L (ref 136–145)
SP GR UR REFRACTOMETRY: 1.02 (ref 1–1.03)
SPECIMEN HOLD: NORMAL
TRIGL SERPL-MCNC: 74 MG/DL
UROBILINOGEN UR QL STRIP.AUTO: 1 EU/DL (ref 0.2–1)
VLDLC SERPL CALC-MCNC: 14.8 MG/DL
WBC # BLD AUTO: 3.3 K/UL (ref 3.6–11)
WBC URNS QL MICRO: ABNORMAL /HPF (ref 0–4)

## 2023-11-05 ENCOUNTER — TELEPHONE (OUTPATIENT)
Age: 68
End: 2023-11-05

## 2023-11-05 DIAGNOSIS — N30.00 ACUTE CYSTITIS WITHOUT HEMATURIA: ICD-10-CM

## 2023-11-05 DIAGNOSIS — R79.89 ABNORMAL CBC: Primary | ICD-10-CM

## 2023-11-05 DIAGNOSIS — N30.00 ACUTE CYSTITIS WITHOUT HEMATURIA: Primary | ICD-10-CM

## 2023-11-05 RX ORDER — CIPROFLOXACIN 500 MG/1
500 TABLET, FILM COATED ORAL 2 TIMES DAILY
Qty: 14 TABLET | Refills: 0 | Status: SHIPPED | OUTPATIENT
Start: 2023-11-05 | End: 2023-11-12

## 2023-11-07 ENCOUNTER — TELEPHONE (OUTPATIENT)
Age: 68
End: 2023-11-07

## 2023-11-07 NOTE — TELEPHONE ENCOUNTER
patient  called    Regarding:   Kaden Beckman  Results    States needs clarification    Request:    Please call to discuss/review

## 2023-11-08 ENCOUNTER — NURSE ONLY (OUTPATIENT)
Age: 68
End: 2023-11-08
Payer: MEDICARE

## 2023-11-08 ENCOUNTER — OFFICE VISIT (OUTPATIENT)
Age: 68
End: 2023-11-08
Payer: MEDICARE

## 2023-11-08 VITALS
HEIGHT: 65 IN | DIASTOLIC BLOOD PRESSURE: 71 MMHG | TEMPERATURE: 97.3 F | RESPIRATION RATE: 19 BRPM | WEIGHT: 181 LBS | OXYGEN SATURATION: 100 % | HEART RATE: 94 BPM | SYSTOLIC BLOOD PRESSURE: 118 MMHG | BODY MASS INDEX: 30.16 KG/M2

## 2023-11-08 DIAGNOSIS — R79.89 ABNORMAL CBC: ICD-10-CM

## 2023-11-08 DIAGNOSIS — N39.0 URINARY TRACT INFECTION WITH HEMATURIA, SITE UNSPECIFIED: Primary | ICD-10-CM

## 2023-11-08 DIAGNOSIS — R31.9 URINARY TRACT INFECTION WITH HEMATURIA, SITE UNSPECIFIED: Primary | ICD-10-CM

## 2023-11-08 DIAGNOSIS — N30.00 ACUTE CYSTITIS WITHOUT HEMATURIA: Primary | ICD-10-CM

## 2023-11-08 PROCEDURE — 1123F ACP DISCUSS/DSCN MKR DOCD: CPT | Performed by: FAMILY MEDICINE

## 2023-11-08 PROCEDURE — 99213 OFFICE O/P EST LOW 20 MIN: CPT | Performed by: FAMILY MEDICINE

## 2023-11-08 PROCEDURE — 1090F PRES/ABSN URINE INCON ASSESS: CPT | Performed by: FAMILY MEDICINE

## 2023-11-08 PROCEDURE — 3074F SYST BP LT 130 MM HG: CPT | Performed by: FAMILY MEDICINE

## 2023-11-08 PROCEDURE — G8483 FLU IMM NO ADMIN DOC REA: HCPCS | Performed by: FAMILY MEDICINE

## 2023-11-08 PROCEDURE — G8427 DOCREV CUR MEDS BY ELIG CLIN: HCPCS | Performed by: FAMILY MEDICINE

## 2023-11-08 PROCEDURE — G8399 PT W/DXA RESULTS DOCUMENT: HCPCS | Performed by: FAMILY MEDICINE

## 2023-11-08 PROCEDURE — G8417 CALC BMI ABV UP PARAM F/U: HCPCS | Performed by: FAMILY MEDICINE

## 2023-11-08 PROCEDURE — 3017F COLORECTAL CA SCREEN DOC REV: CPT | Performed by: FAMILY MEDICINE

## 2023-11-08 PROCEDURE — 3078F DIAST BP <80 MM HG: CPT | Performed by: FAMILY MEDICINE

## 2023-11-08 PROCEDURE — 4004F PT TOBACCO SCREEN RCVD TLK: CPT | Performed by: FAMILY MEDICINE

## 2023-11-09 LAB — PERIPHERAL SMEAR, MD REVIEW: NORMAL

## 2023-11-10 ENCOUNTER — HOSPITAL ENCOUNTER (OUTPATIENT)
Facility: HOSPITAL | Age: 68
End: 2023-11-10
Attending: FAMILY MEDICINE
Payer: MEDICARE

## 2023-11-10 DIAGNOSIS — Z78.0 POST-MENOPAUSAL: ICD-10-CM

## 2023-11-10 LAB
BACTERIA SPEC CULT: NORMAL
CC UR VC: NORMAL
SERVICE CMNT-IMP: NORMAL

## 2023-11-10 PROCEDURE — 77080 DXA BONE DENSITY AXIAL: CPT

## 2023-11-17 ENCOUNTER — NURSE ONLY (OUTPATIENT)
Age: 68
End: 2023-11-17

## 2023-11-17 DIAGNOSIS — E11.9 TYPE 2 DIABETES MELLITUS WITHOUT COMPLICATION, WITHOUT LONG-TERM CURRENT USE OF INSULIN (HCC): ICD-10-CM

## 2023-11-17 DIAGNOSIS — N30.00 ACUTE CYSTITIS WITHOUT HEMATURIA: ICD-10-CM

## 2023-11-17 DIAGNOSIS — R79.89 ABNORMAL CBC: Primary | ICD-10-CM

## 2023-11-17 LAB
APPEARANCE UR: CLEAR
BACTERIA URNS QL MICRO: ABNORMAL /HPF
BASOPHILS # BLD: 0 K/UL (ref 0–0.1)
BASOPHILS NFR BLD: 1 % (ref 0–1)
BILIRUB UR QL: NEGATIVE
COLOR UR: ABNORMAL
DIFFERENTIAL METHOD BLD: ABNORMAL
EOSINOPHIL # BLD: 0.2 K/UL (ref 0–0.4)
EOSINOPHIL NFR BLD: 6 % (ref 0–7)
EPITH CASTS URNS QL MICRO: ABNORMAL /LPF
ERYTHROCYTE [DISTWIDTH] IN BLOOD BY AUTOMATED COUNT: 15.5 % (ref 11.5–14.5)
GLUCOSE UR STRIP.AUTO-MCNC: NEGATIVE MG/DL
HCT VFR BLD AUTO: 39.2 % (ref 35–47)
HGB BLD-MCNC: 12.4 G/DL (ref 11.5–16)
HGB UR QL STRIP: NEGATIVE
IMM GRANULOCYTES # BLD AUTO: 0 K/UL (ref 0–0.04)
IMM GRANULOCYTES NFR BLD AUTO: 0 % (ref 0–0.5)
KETONES UR QL STRIP.AUTO: NEGATIVE MG/DL
LEUKOCYTE ESTERASE UR QL STRIP.AUTO: ABNORMAL
LYMPHOCYTES # BLD: 0.9 K/UL (ref 0.8–3.5)
LYMPHOCYTES NFR BLD: 33 % (ref 12–49)
MCH RBC QN AUTO: 25.6 PG (ref 26–34)
MCHC RBC AUTO-ENTMCNC: 31.6 G/DL (ref 30–36.5)
MCV RBC AUTO: 81 FL (ref 80–99)
MONOCYTES # BLD: 0.3 K/UL (ref 0–1)
MONOCYTES NFR BLD: 13 % (ref 5–13)
NEUTS SEG # BLD: 1.3 K/UL (ref 1.8–8)
NEUTS SEG NFR BLD: 47 % (ref 32–75)
NITRITE UR QL STRIP.AUTO: NEGATIVE
NRBC # BLD: 0 K/UL (ref 0–0.01)
NRBC BLD-RTO: 0 PER 100 WBC
PH UR STRIP: 6 (ref 5–8)
PLATELET # BLD AUTO: 255 K/UL (ref 150–400)
PMV BLD AUTO: 10.4 FL (ref 8.9–12.9)
PROT UR STRIP-MCNC: NEGATIVE MG/DL
RBC # BLD AUTO: 4.84 M/UL (ref 3.8–5.2)
RBC #/AREA URNS HPF: ABNORMAL /HPF (ref 0–5)
SP GR UR REFRACTOMETRY: 1.01 (ref 1–1.03)
UROBILINOGEN UR QL STRIP.AUTO: 0.2 EU/DL (ref 0.2–1)
WBC # BLD AUTO: 2.7 K/UL (ref 3.6–11)
WBC URNS QL MICRO: ABNORMAL /HPF (ref 0–4)

## 2023-11-18 LAB
BACTERIA SPEC CULT: NORMAL
SERVICE CMNT-IMP: NORMAL

## 2023-11-29 ENCOUNTER — TELEPHONE (OUTPATIENT)
Age: 68
End: 2023-11-29

## 2023-11-29 NOTE — TELEPHONE ENCOUNTER
Patient  called    Regarding:   Lab  Results    Request:    Please call to discuss/review        Caller advised that they will be called back as soon as the team is able however there can be a 24-36 business-hour turn-around on callback requests. Caller advised results can take 3-7 days for providers to get the results and up to 1 week to be reviewed.

## 2023-12-05 DIAGNOSIS — R79.89 ABNORMAL CBC: Primary | ICD-10-CM

## 2023-12-11 NOTE — TELEPHONE ENCOUNTER
Caller is returning a call from clinical team regarding RESULTS (from novem 17)    Clinical team not available to take the call    Please call pt back.  [phone]

## 2023-12-15 ENCOUNTER — NURSE ONLY (OUTPATIENT)
Age: 68
End: 2023-12-15

## 2023-12-15 DIAGNOSIS — R79.89 ABNORMAL CBC: ICD-10-CM

## 2023-12-16 LAB — PERIPHERAL SMEAR, MD REVIEW: NORMAL

## 2024-01-04 ENCOUNTER — OFFICE VISIT (OUTPATIENT)
Age: 69
End: 2024-01-04
Payer: MEDICARE

## 2024-01-04 VITALS
DIASTOLIC BLOOD PRESSURE: 68 MMHG | BODY MASS INDEX: 31.65 KG/M2 | OXYGEN SATURATION: 97 % | SYSTOLIC BLOOD PRESSURE: 130 MMHG | TEMPERATURE: 97.6 F | HEART RATE: 82 BPM | WEIGHT: 190 LBS | RESPIRATION RATE: 19 BRPM | HEIGHT: 65 IN

## 2024-01-04 DIAGNOSIS — E11.9 TYPE 2 DIABETES MELLITUS WITHOUT COMPLICATION, WITHOUT LONG-TERM CURRENT USE OF INSULIN (HCC): ICD-10-CM

## 2024-01-04 DIAGNOSIS — K62.89 PAIN, RECTAL: Primary | ICD-10-CM

## 2024-01-04 PROCEDURE — 1090F PRES/ABSN URINE INCON ASSESS: CPT | Performed by: FAMILY MEDICINE

## 2024-01-04 PROCEDURE — 3017F COLORECTAL CA SCREEN DOC REV: CPT | Performed by: FAMILY MEDICINE

## 2024-01-04 PROCEDURE — G8399 PT W/DXA RESULTS DOCUMENT: HCPCS | Performed by: FAMILY MEDICINE

## 2024-01-04 PROCEDURE — 99214 OFFICE O/P EST MOD 30 MIN: CPT | Performed by: FAMILY MEDICINE

## 2024-01-04 PROCEDURE — 3046F HEMOGLOBIN A1C LEVEL >9.0%: CPT | Performed by: FAMILY MEDICINE

## 2024-01-04 PROCEDURE — G8483 FLU IMM NO ADMIN DOC REA: HCPCS | Performed by: FAMILY MEDICINE

## 2024-01-04 PROCEDURE — 4004F PT TOBACCO SCREEN RCVD TLK: CPT | Performed by: FAMILY MEDICINE

## 2024-01-04 PROCEDURE — 1123F ACP DISCUSS/DSCN MKR DOCD: CPT | Performed by: FAMILY MEDICINE

## 2024-01-04 PROCEDURE — 3078F DIAST BP <80 MM HG: CPT | Performed by: FAMILY MEDICINE

## 2024-01-04 PROCEDURE — G8417 CALC BMI ABV UP PARAM F/U: HCPCS | Performed by: FAMILY MEDICINE

## 2024-01-04 PROCEDURE — G8427 DOCREV CUR MEDS BY ELIG CLIN: HCPCS | Performed by: FAMILY MEDICINE

## 2024-01-04 PROCEDURE — 3075F SYST BP GE 130 - 139MM HG: CPT | Performed by: FAMILY MEDICINE

## 2024-01-04 PROCEDURE — 2022F DILAT RTA XM EVC RTNOPTHY: CPT | Performed by: FAMILY MEDICINE

## 2024-01-04 RX ORDER — HYDROCORTISONE ACETATE 25 MG/1
25 SUPPOSITORY RECTAL EVERY 12 HOURS
Qty: 10 SUPPOSITORY | Refills: 0 | Status: SHIPPED | OUTPATIENT
Start: 2024-01-04 | End: 2024-01-09

## 2024-01-04 ASSESSMENT — PATIENT HEALTH QUESTIONNAIRE - PHQ9
SUM OF ALL RESPONSES TO PHQ QUESTIONS 1-9: 0
SUM OF ALL RESPONSES TO PHQ QUESTIONS 1-9: 0
SUM OF ALL RESPONSES TO PHQ9 QUESTIONS 1 & 2: 0
2. FEELING DOWN, DEPRESSED OR HOPELESS: 0
SUM OF ALL RESPONSES TO PHQ QUESTIONS 1-9: 0
1. LITTLE INTEREST OR PLEASURE IN DOING THINGS: 0
SUM OF ALL RESPONSES TO PHQ QUESTIONS 1-9: 0

## 2024-01-04 NOTE — PROGRESS NOTES
SUBJECTIVE:   Ms. Dariana Reyes is a 68 y.o. female who is here for follow up of routine medical issues.      Pt is experiencing irritation which began on the last week of November. She states she began to have a burning sensation while she was cleaning herself. She saw blood on 12/06/2023 when she was cleaning herself and reports seeing a lump of blood on the tissue paper. She is worried it may be a hemorrhoid. She denies pain during her BM. She denies abdominal pain before her BM. Pt denies straining before her BM.     DM2: Pt is complaint in taking metformin 500mg daily. Their last HbA1c was 6.4% on 10/30/2023.     Pt is also requesting for more information on her DEXA results. Her scan states she is osteopenic.         At this time, she is otherwise doing well and has brought no other complaints to my attention today.  For a list of the medical issues addressed today, see the assessment and plan below.    PMH:   Past Medical History:   Diagnosis Date    Diabetes (HCC)     Hypercholesterolemia     Hypertension     Murmur     Other ill-defined conditions(799.89)     high cholesterol     PSH:  has a past surgical history that includes Rotator cuff repair (10/2017); Tubal ligation; orthopedic surgery; and Hysterectomy (3/25/99).    All: is allergic to oxycodone-acetaminophen.   MEDS:   Current Outpatient Medications   Medication Sig    hydrocortisone (ANUSOL-HC) 25 MG suppository Place 1 suppository rectally in the morning and 1 suppository in the evening. Do all this for 5 days.    blood glucose monitor strips Test one times a day & as needed for symptoms of irregular blood glucose. Dispense sufficient amount for indicated testing frequency plus additional to accommodate PRN testing needs.    hydroCHLOROthiazide (HYDRODIURIL) 25 MG tablet TAKE 1 TABLET BY MOUTH EVERY DAY    lisinopril (PRINIVIL;ZESTRIL) 40 MG tablet TAKE 1 TABLET BY MOUTH EVERY DAY    amLODIPine (NORVASC) 10 MG tablet TAKE 1 TABLET BY MOUTH EVERY

## 2024-01-04 NOTE — PROGRESS NOTES
1. \"Have you been to the ER, urgent care clinic since your last visit?  Hospitalized since your last visit?\" No    2. \"Have you seen or consulted any other health care providers outside of the [facility] since your last visit?\" No     3. For patients aged 45-75: Has the patient had a colonoscopy / FIT/ Cologuard? Yes - no Care Gap present      If the patient is female:    4. For patients aged 40-74: Has the patient had a mammogram within the past 2 years? Yes - no Care Gap present      5. For patients aged 21-65: Has the patient had a pap smear? NA - based on age or sex

## 2024-02-08 ENCOUNTER — TRANSCRIBE ORDERS (OUTPATIENT)
Facility: HOSPITAL | Age: 69
End: 2024-02-08

## 2024-02-08 DIAGNOSIS — Z12.31 VISIT FOR SCREENING MAMMOGRAM: Primary | ICD-10-CM

## 2024-02-12 NOTE — TELEPHONE ENCOUNTER
Identified patient 2 identifiers verified. Patient was given phone number to Dr. Melinda Blackburn office.
No

## 2024-02-25 DIAGNOSIS — I10 ESSENTIAL (PRIMARY) HYPERTENSION: ICD-10-CM

## 2024-03-01 ENCOUNTER — HOSPITAL ENCOUNTER (OUTPATIENT)
Facility: HOSPITAL | Age: 69
End: 2024-03-01
Attending: FAMILY MEDICINE
Payer: MEDICARE

## 2024-03-01 VITALS — WEIGHT: 190 LBS | HEIGHT: 65 IN | BODY MASS INDEX: 31.65 KG/M2

## 2024-03-01 DIAGNOSIS — Z12.31 VISIT FOR SCREENING MAMMOGRAM: ICD-10-CM

## 2024-03-01 PROCEDURE — 77063 BREAST TOMOSYNTHESIS BI: CPT

## 2024-03-04 DIAGNOSIS — E78.2 MIXED HYPERLIPIDEMIA: ICD-10-CM

## 2024-03-04 RX ORDER — ROSUVASTATIN CALCIUM 10 MG/1
TABLET, COATED ORAL
Qty: 90 TABLET | Refills: 2 | Status: SHIPPED | OUTPATIENT
Start: 2024-03-04

## 2024-05-31 RX ORDER — LISINOPRIL 40 MG/1
TABLET ORAL
Qty: 90 TABLET | Refills: 2 | Status: SHIPPED | OUTPATIENT
Start: 2024-05-31

## 2024-06-07 RX ORDER — HYDROCHLOROTHIAZIDE 25 MG/1
TABLET ORAL
Qty: 90 TABLET | Refills: 2 | Status: SHIPPED | OUTPATIENT
Start: 2024-06-07

## 2024-06-11 ENCOUNTER — TELEPHONE (OUTPATIENT)
Age: 69
End: 2024-06-11

## 2024-06-11 NOTE — TELEPHONE ENCOUNTER
----- Message from Vanessa Perry sent at 6/11/2024  8:57 AM EDT -----  Subject: Appointment Request    Reason for Call: Established Patient Appointment needed: Semi-Routine Skin   Problem    QUESTIONS    Reason for appointment request? Available appointments did not meet   patient need     Additional Information for Provider? Dr. Thompson's pt please call to   schedule an appt for lip swelling after Friday 6/14/24;   ---------------------------------------------------------------------------  --------------  CALL BACK INFO  [phone]; OK to leave message on voicemail  ---------------------------------------------------------------------------  --------------  SCRIPT ANSWERS

## 2024-06-19 ENCOUNTER — OFFICE VISIT (OUTPATIENT)
Age: 69
End: 2024-06-19
Payer: MEDICARE

## 2024-06-19 VITALS
HEART RATE: 68 BPM | BODY MASS INDEX: 28.42 KG/M2 | SYSTOLIC BLOOD PRESSURE: 121 MMHG | TEMPERATURE: 98 F | OXYGEN SATURATION: 99 % | WEIGHT: 170.6 LBS | DIASTOLIC BLOOD PRESSURE: 61 MMHG | HEIGHT: 65 IN | RESPIRATION RATE: 18 BRPM

## 2024-06-19 DIAGNOSIS — I10 PRIMARY HYPERTENSION: Primary | ICD-10-CM

## 2024-06-19 DIAGNOSIS — E11.9 TYPE 2 DIABETES MELLITUS WITHOUT COMPLICATION, WITHOUT LONG-TERM CURRENT USE OF INSULIN (HCC): ICD-10-CM

## 2024-06-19 DIAGNOSIS — I10 PRIMARY HYPERTENSION: ICD-10-CM

## 2024-06-19 LAB
ALBUMIN SERPL-MCNC: 3.8 G/DL (ref 3.5–5)
ALBUMIN/GLOB SERPL: 1.2 (ref 1.1–2.2)
ALP SERPL-CCNC: 91 U/L (ref 45–117)
ALT SERPL-CCNC: 28 U/L (ref 12–78)
ANION GAP SERPL CALC-SCNC: 5 MMOL/L (ref 5–15)
AST SERPL-CCNC: 19 U/L (ref 15–37)
BILIRUB SERPL-MCNC: 0.4 MG/DL (ref 0.2–1)
BUN SERPL-MCNC: 16 MG/DL (ref 6–20)
BUN/CREAT SERPL: 24 (ref 12–20)
CALCIUM SERPL-MCNC: 9.8 MG/DL (ref 8.5–10.1)
CHLORIDE SERPL-SCNC: 107 MMOL/L (ref 97–108)
CO2 SERPL-SCNC: 31 MMOL/L (ref 21–32)
CREAT SERPL-MCNC: 0.67 MG/DL (ref 0.55–1.02)
EST. AVERAGE GLUCOSE BLD GHB EST-MCNC: 120 MG/DL
GLOBULIN SER CALC-MCNC: 3.2 G/DL (ref 2–4)
GLUCOSE SERPL-MCNC: 91 MG/DL (ref 65–100)
HBA1C MFR BLD: 5.8 % (ref 4–5.6)
POTASSIUM SERPL-SCNC: 3.9 MMOL/L (ref 3.5–5.1)
PROT SERPL-MCNC: 7 G/DL (ref 6.4–8.2)
SODIUM SERPL-SCNC: 143 MMOL/L (ref 136–145)

## 2024-06-19 PROCEDURE — 99214 OFFICE O/P EST MOD 30 MIN: CPT | Performed by: FAMILY MEDICINE

## 2024-06-19 PROCEDURE — 3074F SYST BP LT 130 MM HG: CPT | Performed by: FAMILY MEDICINE

## 2024-06-19 PROCEDURE — 3078F DIAST BP <80 MM HG: CPT | Performed by: FAMILY MEDICINE

## 2024-06-19 PROCEDURE — 3044F HG A1C LEVEL LT 7.0%: CPT | Performed by: FAMILY MEDICINE

## 2024-06-19 PROCEDURE — 1123F ACP DISCUSS/DSCN MKR DOCD: CPT | Performed by: FAMILY MEDICINE

## 2024-06-19 RX ORDER — OMEGA-3/DHA/EPA/FISH OIL 300-1000MG
1 CAPSULE ORAL
COMMUNITY

## 2024-06-19 RX ORDER — OLMESARTAN MEDOXOMIL 20 MG/1
20 TABLET ORAL DAILY
Qty: 30 TABLET | Refills: 3 | Status: SHIPPED | OUTPATIENT
Start: 2024-06-19

## 2024-06-19 NOTE — PROGRESS NOTES
\"Have you been to the ER, urgent care clinic since your last visit?  Hospitalized since your last visit?\"    NO    “Have you seen or consulted any other health care providers outside of [facility] since your last visit?”    NO        “Have you had a colorectal cancer screening such as a colonoscopy/FIT/Cologuard?    June 14,2024    Date of last Colonoscopy: 3/12/2014  No cologuard on file  No FIT/FOBT on file   No flexible sigmoidoscopy on file         Click Here for Release of Records Request

## 2024-06-19 NOTE — PROGRESS NOTES
SUBJECTIVE:   Ms. Dariana Reyes is a 69 y.o. female who is here for follow up of routine medical issues.      HTN: Patient is complaint in taking HCTZ 25 mg daily and lisinopril 40 mg daily, metoprolol tartrate 25 mg BID, and amlodipine 10 mg daily. Their BP today was 121/61.  DM2: Pt is complaint in taking metformin 500 mg. Their last HbA1c was 6.4 on 10/30/2023.  HLD: Pt is complaint in taking rosuvastatin 20 mg nightly. Their last lipid panel was normal on 10/30/2023.    Patient presents today for lip swelling that has been happening intermittently for the last 6 months. Pt denies any coughing. Pt states it has happened 3 times in the last 6 months. Patient brings in pictures showing and swollen bottom lip. Pt explains multiple family members have all had the same reaction to the lisinopril.    At this time, she is otherwise doing well and has brought no other complaints to my attention today.  For a list of the medical issues addressed today, see the assessment and plan below.    PMH:   Past Medical History:   Diagnosis Date    Diabetes (HCC)     Hypercholesterolemia     Hypertension     Murmur     Other ill-defined conditions(799.89)     high cholesterol     PSH:  has a past surgical history that includes Rotator cuff repair (10/2017); Tubal ligation; orthopedic surgery; and Hysterectomy (3/25/99).    All: is allergic to lisinopril and oxycodone-acetaminophen.   MEDS:   Current Outpatient Medications   Medication Sig    MULTIPLE VITAMINS PO Take by mouth    fish oil-omega-3 fatty acids 1000 MG capsule Take 1 capsule by mouth    olmesartan (BENICAR) 20 MG tablet Take 1 tablet by mouth daily    hydroCHLOROthiazide (HYDRODIURIL) 25 MG tablet TAKE 1 TABLET BY MOUTH EVERY DAY    rosuvastatin (CRESTOR) 10 MG tablet TAKE 1 TABLET BY MOUTH EVERY DAY IN THE EVENING    metoprolol tartrate (LOPRESSOR) 25 MG tablet TAKE 1 TABLET BY MOUTH TWICE A DAY    metFORMIN (GLUCOPHAGE) 500 MG tablet TAKE 1 TABLET BY MOUTH EVERY DAY

## 2024-08-20 DIAGNOSIS — I10 ESSENTIAL (PRIMARY) HYPERTENSION: ICD-10-CM

## 2024-08-24 DIAGNOSIS — E78.2 MIXED HYPERLIPIDEMIA: ICD-10-CM

## 2024-08-26 RX ORDER — ROSUVASTATIN CALCIUM 10 MG/1
TABLET, COATED ORAL
Qty: 90 TABLET | Refills: 2 | Status: SHIPPED | OUTPATIENT
Start: 2024-08-26

## 2024-09-26 DIAGNOSIS — I10 PRIMARY HYPERTENSION: ICD-10-CM

## 2024-09-29 RX ORDER — OLMESARTAN MEDOXOMIL 20 MG/1
TABLET ORAL
Qty: 90 TABLET | Refills: 2 | Status: SHIPPED | OUTPATIENT
Start: 2024-09-29

## 2024-10-10 ENCOUNTER — OFFICE VISIT (OUTPATIENT)
Age: 69
End: 2024-10-10
Payer: MEDICARE

## 2024-10-10 VITALS
DIASTOLIC BLOOD PRESSURE: 75 MMHG | TEMPERATURE: 97.9 F | OXYGEN SATURATION: 99 % | BODY MASS INDEX: 30.16 KG/M2 | RESPIRATION RATE: 18 BRPM | HEIGHT: 65 IN | SYSTOLIC BLOOD PRESSURE: 132 MMHG | WEIGHT: 181 LBS | HEART RATE: 65 BPM

## 2024-10-10 DIAGNOSIS — R35.0 URINE FREQUENCY: Primary | ICD-10-CM

## 2024-10-10 LAB
BILIRUBIN, URINE, POC: NEGATIVE
BLOOD URINE, POC: NEGATIVE
GLUCOSE URINE, POC: NEGATIVE
KETONES, URINE, POC: NEGATIVE
LEUKOCYTE ESTERASE, URINE, POC: NEGATIVE
NITRITE, URINE, POC: NEGATIVE
PH, URINE, POC: 7 (ref 4.6–8)
PROTEIN,URINE, POC: NEGATIVE
SPECIFIC GRAVITY, URINE, POC: 1.01 (ref 1–1.03)
URINALYSIS CLARITY, POC: CLEAR
URINALYSIS COLOR, POC: NORMAL
UROBILINOGEN, POC: NORMAL

## 2024-10-10 PROCEDURE — 99213 OFFICE O/P EST LOW 20 MIN: CPT | Performed by: FAMILY MEDICINE

## 2024-10-10 PROCEDURE — PBSHW AMB POC URINALYSIS DIP STICK AUTO W/O MICRO: Performed by: FAMILY MEDICINE

## 2024-10-10 PROCEDURE — 81003 URINALYSIS AUTO W/O SCOPE: CPT | Performed by: FAMILY MEDICINE

## 2024-10-10 PROCEDURE — 3078F DIAST BP <80 MM HG: CPT | Performed by: FAMILY MEDICINE

## 2024-10-10 PROCEDURE — 1123F ACP DISCUSS/DSCN MKR DOCD: CPT | Performed by: FAMILY MEDICINE

## 2024-10-10 PROCEDURE — 3075F SYST BP GE 130 - 139MM HG: CPT | Performed by: FAMILY MEDICINE

## 2024-10-10 ASSESSMENT — PATIENT HEALTH QUESTIONNAIRE - PHQ9
SUM OF ALL RESPONSES TO PHQ QUESTIONS 1-9: 0
SUM OF ALL RESPONSES TO PHQ9 QUESTIONS 1 & 2: 0
SUM OF ALL RESPONSES TO PHQ QUESTIONS 1-9: 0
1. LITTLE INTEREST OR PLEASURE IN DOING THINGS: NOT AT ALL
SUM OF ALL RESPONSES TO PHQ QUESTIONS 1-9: 0
2. FEELING DOWN, DEPRESSED OR HOPELESS: NOT AT ALL
SUM OF ALL RESPONSES TO PHQ QUESTIONS 1-9: 0

## 2024-10-10 NOTE — PROGRESS NOTES
[place] BJECTIVE:   Ms. Dariana Reyes is a 69 y.o. female who is here for follow up of routine medical issues.      HTN: Patient is complaint in taking metoprolol tartrate 25 mg daily and olmesartan 20 mg daily. Their BP today was 132/75.    Pt has noticed urinary frequency. She denies any changes in her drinking habits. She has been drinking prune juice which is new. She admits she has been trying to stay more hydrated. Pt is complaint in taking her fluid pill HCTZ 25 mg daily.    At this time, she is otherwise doing well and has brought no other complaints to my attention today.  For a list of the medical issues addressed today, see the assessment and plan below.    PMH:   Past Medical History:   Diagnosis Date    Diabetes (HCC)     Hypercholesterolemia     Hypertension     Murmur     Other ill-defined conditions(799.89)     high cholesterol     PSH:  has a past surgical history that includes Rotator cuff repair (10/2017); Tubal ligation; orthopedic surgery; and Hysterectomy (3/25/99).    All: is allergic to lisinopril and oxycodone-acetaminophen.   MEDS:   Current Outpatient Medications   Medication Sig    olmesartan (BENICAR) 20 MG tablet TAKE ONE TAB BY MOUTH DAILY    rosuvastatin (CRESTOR) 10 MG tablet TAKE 1 TABLET BY MOUTH EVERY DAY IN THE EVENING    metoprolol tartrate (LOPRESSOR) 25 MG tablet TAKE 1 TABLET BY MOUTH TWICE A DAY    metFORMIN (GLUCOPHAGE) 500 MG tablet TAKE 1 TABLET BY MOUTH EVERY DAY    MULTIPLE VITAMINS PO Take by mouth    fish oil-omega-3 fatty acids 1000 MG capsule Take 1 capsule by mouth    hydroCHLOROthiazide (HYDRODIURIL) 25 MG tablet TAKE 1 TABLET BY MOUTH EVERY DAY    blood glucose monitor strips Test one times a day & as needed for symptoms of irregular blood glucose. Dispense sufficient amount for indicated testing frequency plus additional to accommodate PRN testing needs.    amLODIPine (NORVASC) 10 MG tablet TAKE 1 TABLET BY MOUTH EVERY DAY     No current

## 2024-10-10 NOTE — PROGRESS NOTES
\"Have you been to the ER, urgent care clinic since your last visit?  Hospitalized since your last visit?\"    NO    “Have you seen or consulted any other health care providers outside our system since your last visit?”    NO      “Have you had a diabetic eye exam?”    Scheduled 10-11-24    Date of last diabetic eye exam: 9/28/2020

## 2024-11-29 DIAGNOSIS — I10 ESSENTIAL (PRIMARY) HYPERTENSION: ICD-10-CM

## 2024-12-01 RX ORDER — AMLODIPINE BESYLATE 10 MG/1
10 TABLET ORAL DAILY
Qty: 90 TABLET | Refills: 4 | Status: SHIPPED | OUTPATIENT
Start: 2024-12-01

## 2025-01-14 ENCOUNTER — TELEPHONE (OUTPATIENT)
Age: 70
End: 2025-01-14

## 2025-01-14 NOTE — TELEPHONE ENCOUNTER
----- Message from Shayne LARSON sent at 1/14/2025  1:43 PM EST -----  Regarding: [facility] Appointment Request  [facility] Appointment Request    Patient needs appointment for [facility] Appointment Type: Annual Visit.    Patient Requested Dates(s): As soon as possible   Patient Requested Time: Any day   Provider Name:Marie Thompson MD      Reason for Appointment Request: Established Patient - Available appointments did not meet patient need/wanted more sooner   --------------------------------------------------------------------------------------------------------------------------    Relationship to Patient: Self     Call Back Information: OK to leave message on voicemail  Preferred Call Back Number: Phone [phone]

## 2025-01-22 ENCOUNTER — OFFICE VISIT (OUTPATIENT)
Age: 70
End: 2025-01-22
Payer: MEDICARE

## 2025-01-22 VITALS
TEMPERATURE: 97.9 F | DIASTOLIC BLOOD PRESSURE: 66 MMHG | BODY MASS INDEX: 31.06 KG/M2 | HEIGHT: 65 IN | RESPIRATION RATE: 18 BRPM | SYSTOLIC BLOOD PRESSURE: 132 MMHG | OXYGEN SATURATION: 99 % | HEART RATE: 76 BPM | WEIGHT: 186.4 LBS

## 2025-01-22 DIAGNOSIS — Z12.31 BREAST CANCER SCREENING BY MAMMOGRAM: ICD-10-CM

## 2025-01-22 DIAGNOSIS — E78.2 MIXED HYPERLIPIDEMIA: ICD-10-CM

## 2025-01-22 DIAGNOSIS — E11.9 TYPE 2 DIABETES MELLITUS WITHOUT COMPLICATION, WITHOUT LONG-TERM CURRENT USE OF INSULIN (HCC): ICD-10-CM

## 2025-01-22 DIAGNOSIS — Z00.00 MEDICARE ANNUAL WELLNESS VISIT, SUBSEQUENT: Primary | ICD-10-CM

## 2025-01-22 DIAGNOSIS — I10 ESSENTIAL (PRIMARY) HYPERTENSION: ICD-10-CM

## 2025-01-22 PROCEDURE — G0439 PPPS, SUBSEQ VISIT: HCPCS | Performed by: FAMILY MEDICINE

## 2025-01-22 PROCEDURE — 3075F SYST BP GE 130 - 139MM HG: CPT | Performed by: FAMILY MEDICINE

## 2025-01-22 PROCEDURE — 1159F MED LIST DOCD IN RCRD: CPT | Performed by: FAMILY MEDICINE

## 2025-01-22 PROCEDURE — 1123F ACP DISCUSS/DSCN MKR DOCD: CPT | Performed by: FAMILY MEDICINE

## 2025-01-22 PROCEDURE — 3078F DIAST BP <80 MM HG: CPT | Performed by: FAMILY MEDICINE

## 2025-01-22 SDOH — ECONOMIC STABILITY: FOOD INSECURITY: WITHIN THE PAST 12 MONTHS, THE FOOD YOU BOUGHT JUST DIDN'T LAST AND YOU DIDN'T HAVE MONEY TO GET MORE.: NEVER TRUE

## 2025-01-22 SDOH — ECONOMIC STABILITY: FOOD INSECURITY: WITHIN THE PAST 12 MONTHS, YOU WORRIED THAT YOUR FOOD WOULD RUN OUT BEFORE YOU GOT MONEY TO BUY MORE.: NEVER TRUE

## 2025-01-22 ASSESSMENT — PATIENT HEALTH QUESTIONNAIRE - PHQ9
SUM OF ALL RESPONSES TO PHQ QUESTIONS 1-9: 0
SUM OF ALL RESPONSES TO PHQ QUESTIONS 1-9: 0
1. LITTLE INTEREST OR PLEASURE IN DOING THINGS: NOT AT ALL
SUM OF ALL RESPONSES TO PHQ9 QUESTIONS 1 & 2: 0
2. FEELING DOWN, DEPRESSED OR HOPELESS: NOT AT ALL
SUM OF ALL RESPONSES TO PHQ QUESTIONS 1-9: 0
SUM OF ALL RESPONSES TO PHQ QUESTIONS 1-9: 0

## 2025-01-22 ASSESSMENT — LIFESTYLE VARIABLES
HOW MANY STANDARD DRINKS CONTAINING ALCOHOL DO YOU HAVE ON A TYPICAL DAY: 1 OR 2
HOW OFTEN DO YOU HAVE A DRINK CONTAINING ALCOHOL: MONTHLY OR LESS

## 2025-01-22 NOTE — PROGRESS NOTES
Medicare Annual Wellness Visit    Dariana Reyes is here for Medicare AWV ([place] [facility])    Assessment & Plan   Medicare annual wellness visit, subsequent  Type 2 diabetes mellitus without complication, without long-term current use of insulin (HCC)  Essential (primary) hypertension  Mixed hyperlipidemia  Breast cancer screening by mammogram       Return in 1 year (on 1/22/2026) for Medicare AWV.     Subjective       Patient's complete Health Risk Assessment and screening values have been reviewed and are found in Flowsheets. The following problems were reviewed today and where indicated follow up appointments were made and/or referrals ordered.    Positive Risk Factor Screenings with Interventions:                Abnormal BMI (obese):  Body mass index is 31.02 kg/m². (!) Abnormal  Interventions:  eliminate/reduce simple carbohydrates and high fat foods                           Objective   Vitals:    01/22/25 1451   BP: 132/66   Site: Left Upper Arm   Position: Sitting   Cuff Size: Medium Adult   Pulse: 76   Resp: 18   Temp: 97.9 °F (36.6 °C)   TempSrc: Oral   SpO2: 99%   Weight: 84.6 kg (186 lb 6.4 oz)   Height: 1.651 m (5' 5\")      Body mass index is 31.02 kg/m².                    Allergies   Allergen Reactions    Lisinopril Angioedema    Oxycodone-Acetaminophen Hives     Prior to Visit Medications    Medication Sig Taking? Authorizing Provider   amLODIPine (NORVASC) 10 MG tablet TAKE 1 TABLET BY MOUTH EVERY DAY Yes Marie Thompson MD   olmesartan (BENICAR) 20 MG tablet TAKE ONE TAB BY MOUTH DAILY Yes Marie Thompson MD   rosuvastatin (CRESTOR) 10 MG tablet TAKE 1 TABLET BY MOUTH EVERY DAY IN THE EVENING Yes Marie Thompson MD   metoprolol tartrate (LOPRESSOR) 25 MG tablet TAKE 1 TABLET BY MOUTH TWICE A DAY Yes Marie Thompson MD   metFORMIN (GLUCOPHAGE) 500 MG tablet TAKE 1 TABLET BY MOUTH EVERY DAY Yes Marie Thompson MD   MULTIPLE VITAMINS PO Take by mouth Yes ProviderFady MD   fish oil-omega-3 fatty acids 
\"Have you been to the ER, urgent care clinic since your last visit?  Hospitalized since your last visit?\"    NO    “Have you seen or consulted any other health care providers outside our system since your last visit?”    NO           
all groups.    NEURO: Alert and oriented x 3.  Cranial nerves grossly intact.  No focal sensory or motor deficits noted.   SKIN: Warm. Dry. No rashes or other lesions noted.    ASSESSMENT/ PLAN: Dariana was seen today for medicare aw.    Diagnoses and all orders for this visit:    Medicare annual wellness visit, subsequent    Type 2 diabetes mellitus without complication, without long-term current use of insulin (HCC)  Pt's blood sugar is controlled. We will recheck levels.. We discussed the importance of  eating a healthy diet and exercising regularly. Pt will continue current regimen.   -     Hemoglobin A1C; Future    Essential (primary) hypertension  BP is controlled. Pt should continue with current regimen. We will recheck labs today.  -     CBC with Auto Differential; Future  -     Comprehensive Metabolic Panel; Future    Mixed hyperlipidemia  Pt cholesterol levels are controlled. We will recheck levels. We discussed the importance of monitoring the fats in their diet. Pt should continue current regimen.  -     Lipid Panel; Future    Breast cancer screening by mammogram  I sent in an order for mammogram screening.  -     GAMALIEL LORI DIGITAL SCREEN BILATERAL; Future    We discussed that her Bell's palsy may have been triggered by recent viral infection.  This has completely resolved.    I have reviewed the patient's medications and risks/side effects/benefits were discussed. Diagnosis(-es) explained to patient and questions answered. Literature provided where appropriate.       I, Brynn Bennett, am scribing for and in the presence of Marie Thompson MD. 1/22/25/3:32 PM EST  I have reviewed the note documented by the scribe.  The services provided are my own.  The documentation is accurate. Marie Thompson MD

## 2025-01-26 DIAGNOSIS — I10 ESSENTIAL (PRIMARY) HYPERTENSION: ICD-10-CM

## 2025-01-29 RX ORDER — HYDROCHLOROTHIAZIDE 25 MG/1
TABLET ORAL
Qty: 90 TABLET | Refills: 2 | Status: SHIPPED | OUTPATIENT
Start: 2025-01-29

## 2025-01-29 RX ORDER — METOPROLOL TARTRATE 25 MG/1
TABLET, FILM COATED ORAL
Qty: 180 TABLET | Refills: 1 | Status: SHIPPED | OUTPATIENT
Start: 2025-01-29

## 2025-02-28 DIAGNOSIS — E78.2 MIXED HYPERLIPIDEMIA: ICD-10-CM

## 2025-02-28 DIAGNOSIS — I10 ESSENTIAL (PRIMARY) HYPERTENSION: ICD-10-CM

## 2025-02-28 DIAGNOSIS — E11.9 TYPE 2 DIABETES MELLITUS WITHOUT COMPLICATION, WITHOUT LONG-TERM CURRENT USE OF INSULIN (HCC): ICD-10-CM

## 2025-03-01 LAB
ALBUMIN SERPL-MCNC: 3.9 G/DL (ref 3.5–5)
ALBUMIN/GLOB SERPL: 1.1 (ref 1.1–2.2)
ALP SERPL-CCNC: 83 U/L (ref 45–117)
ALT SERPL-CCNC: 30 U/L (ref 12–78)
ANION GAP SERPL CALC-SCNC: 6 MMOL/L (ref 2–12)
AST SERPL-CCNC: 19 U/L (ref 15–37)
BASOPHILS # BLD: 0.03 K/UL (ref 0–0.1)
BASOPHILS NFR BLD: 1 % (ref 0–1)
BILIRUB SERPL-MCNC: 0.4 MG/DL (ref 0.2–1)
BUN SERPL-MCNC: 14 MG/DL (ref 6–20)
BUN/CREAT SERPL: 20 (ref 12–20)
CALCIUM SERPL-MCNC: 9.8 MG/DL (ref 8.5–10.1)
CHLORIDE SERPL-SCNC: 107 MMOL/L (ref 97–108)
CHOLEST SERPL-MCNC: 163 MG/DL
CO2 SERPL-SCNC: 29 MMOL/L (ref 21–32)
CREAT SERPL-MCNC: 0.7 MG/DL (ref 0.55–1.02)
DIFFERENTIAL METHOD BLD: ABNORMAL
EOSINOPHIL # BLD: 0.17 K/UL (ref 0–0.4)
EOSINOPHIL NFR BLD: 5.6 % (ref 0–7)
ERYTHROCYTE [DISTWIDTH] IN BLOOD BY AUTOMATED COUNT: 15.9 % (ref 11.5–14.5)
EST. AVERAGE GLUCOSE BLD GHB EST-MCNC: 137 MG/DL
GLOBULIN SER CALC-MCNC: 3.5 G/DL (ref 2–4)
GLUCOSE SERPL-MCNC: 140 MG/DL (ref 65–100)
HBA1C MFR BLD: 6.4 % (ref 4–5.6)
HCT VFR BLD AUTO: 39.3 % (ref 35–47)
HDLC SERPL-MCNC: 72 MG/DL
HDLC SERPL: 2.3 (ref 0–5)
HGB BLD-MCNC: 12.5 G/DL (ref 11.5–16)
IMM GRANULOCYTES # BLD AUTO: 0.01 K/UL (ref 0–0.04)
IMM GRANULOCYTES NFR BLD AUTO: 0.3 % (ref 0–0.5)
LDLC SERPL CALC-MCNC: 76.2 MG/DL (ref 0–100)
LYMPHOCYTES # BLD: 1.19 K/UL (ref 0.8–3.5)
LYMPHOCYTES NFR BLD: 38.9 % (ref 12–49)
MCH RBC QN AUTO: 26.3 PG (ref 26–34)
MCHC RBC AUTO-ENTMCNC: 31.8 G/DL (ref 30–36.5)
MCV RBC AUTO: 82.7 FL (ref 80–99)
MONOCYTES # BLD: 0.3 K/UL (ref 0–1)
MONOCYTES NFR BLD: 9.8 % (ref 5–13)
NEUTS SEG # BLD: 1.36 K/UL (ref 1.8–8)
NEUTS SEG NFR BLD: 44.4 % (ref 32–75)
NRBC # BLD: 0 K/UL (ref 0–0.01)
NRBC BLD-RTO: 0 PER 100 WBC
PLATELET # BLD AUTO: 228 K/UL (ref 150–400)
PMV BLD AUTO: 11.2 FL (ref 8.9–12.9)
POTASSIUM SERPL-SCNC: 4.3 MMOL/L (ref 3.5–5.1)
PROT SERPL-MCNC: 7.4 G/DL (ref 6.4–8.2)
RBC # BLD AUTO: 4.75 M/UL (ref 3.8–5.2)
SODIUM SERPL-SCNC: 142 MMOL/L (ref 136–145)
TRIGL SERPL-MCNC: 74 MG/DL
VLDLC SERPL CALC-MCNC: 14.8 MG/DL
WBC # BLD AUTO: 3.1 K/UL (ref 3.6–11)

## 2025-03-22 ENCOUNTER — RESULTS FOLLOW-UP (OUTPATIENT)
Age: 70
End: 2025-03-22

## 2025-03-22 DIAGNOSIS — R73.09 ELEVATED HEMOGLOBIN A1C: Primary | ICD-10-CM

## 2025-03-25 NOTE — TELEPHONE ENCOUNTER
----- Message from Dr. Marie Thompson MD sent at 3/22/2025  5:44 AM EDT -----  CMP:Normal electrolyte levels except for an elevation in the glucose level, normal renal and liver function   A1c:Your current hgbA1c of 6.4 is higher than your last level of 5.8.  Work on following a diabetic diet and exercise.  Recheck this test: hgbA1c  in  3 months.  Continue with current  medications.  Lipids: Normal  CBC:Your red blood cells are normal, The neutrophils (white cells) remain slightly low. I will consult a hematologist to get see if he recommends any further evaluation.

## 2025-04-09 DIAGNOSIS — E11.9 TYPE 2 DIABETES MELLITUS WITHOUT COMPLICATION, WITHOUT LONG-TERM CURRENT USE OF INSULIN: ICD-10-CM

## 2025-04-09 NOTE — TELEPHONE ENCOUNTER
Caller requests Refill of:    blood glucose monitor strips     Please send to:    [facility]/pharmacy #[id number] - [place], [place] - [address] -  [phone] - F [phone]  [address] [address]  Phone: [phone] Fax: [phone]         Visit / Appointment History:  Future Appointment at [facility]:  Visit date not found   Last Appointment With PCP:  1/22/2025       Caller confirmed instructions and dosages as correct.    Caller was advised that Meds will be refilled as soon as possible, however there can be a 48-72 business hour turn around on refill requests.

## 2025-04-09 NOTE — TELEPHONE ENCOUNTER
Future Appointments:  Future Appointments   Date Time Provider Department Center   4/18/2025  9:20 AM C [facility] 1 [facility] [place] [facility]   1/26/2026  9:00 AM Marie Thompson MD [facility] DEP        Last Appointment With Me:  1/22/2025     Requested Prescriptions     Pending Prescriptions Disp Refills    blood glucose monitor strips 100 strip 11     Sig: Test one times a day & as needed for symptoms of irregular blood glucose. Dispense sufficient amount for indicated testing frequency plus additional to accommodate PRN testing needs.

## 2025-04-10 RX ORDER — GLUCOSAMINE HCL/CHONDROITIN SU 500-400 MG
CAPSULE ORAL
Qty: 100 STRIP | Refills: 11 | Status: SHIPPED | OUTPATIENT
Start: 2025-04-10

## 2025-04-11 DIAGNOSIS — R73.09 ELEVATED HEMOGLOBIN A1C: ICD-10-CM

## 2025-04-11 LAB
EST. AVERAGE GLUCOSE BLD GHB EST-MCNC: 137 MG/DL
HBA1C MFR BLD: 6.4 % (ref 4–5.6)

## 2025-04-14 ENCOUNTER — RESULTS FOLLOW-UP (OUTPATIENT)
Age: 70
End: 2025-04-14

## 2025-04-17 ENCOUNTER — TELEPHONE (OUTPATIENT)
Age: 70
End: 2025-04-17

## 2025-04-17 DIAGNOSIS — E11.69 TYPE 2 DIABETES MELLITUS WITH OTHER SPECIFIED COMPLICATION, UNSPECIFIED WHETHER LONG TERM INSULIN USE (HCC): Primary | ICD-10-CM

## 2025-04-18 ENCOUNTER — HOSPITAL ENCOUNTER (OUTPATIENT)
Facility: HOSPITAL | Age: 70
Discharge: HOME | End: 2025-04-21
Attending: FAMILY MEDICINE
Payer: MEDICARE

## 2025-04-18 DIAGNOSIS — Z12.31 BREAST CANCER SCREENING BY MAMMOGRAM: ICD-10-CM

## 2025-04-18 PROCEDURE — 77067 SCR MAMMO BI INCL CAD: CPT

## 2025-04-30 ENCOUNTER — OFFICE VISIT (OUTPATIENT)
Age: 70
End: 2025-04-30
Payer: MEDICARE

## 2025-04-30 VITALS
RESPIRATION RATE: 18 BRPM | HEART RATE: 73 BPM | HEIGHT: 65 IN | BODY MASS INDEX: 30.75 KG/M2 | TEMPERATURE: 97.8 F | SYSTOLIC BLOOD PRESSURE: 123 MMHG | WEIGHT: 184.6 LBS | OXYGEN SATURATION: 97 % | DIASTOLIC BLOOD PRESSURE: 63 MMHG

## 2025-04-30 DIAGNOSIS — M79.602 LEFT ARM PAIN: Primary | ICD-10-CM

## 2025-04-30 PROCEDURE — 99214 OFFICE O/P EST MOD 30 MIN: CPT | Performed by: FAMILY MEDICINE

## 2025-04-30 PROCEDURE — 3074F SYST BP LT 130 MM HG: CPT | Performed by: FAMILY MEDICINE

## 2025-04-30 PROCEDURE — 3078F DIAST BP <80 MM HG: CPT | Performed by: FAMILY MEDICINE

## 2025-04-30 PROCEDURE — 1159F MED LIST DOCD IN RCRD: CPT | Performed by: FAMILY MEDICINE

## 2025-04-30 PROCEDURE — 1123F ACP DISCUSS/DSCN MKR DOCD: CPT | Performed by: FAMILY MEDICINE

## 2025-04-30 NOTE — PROGRESS NOTES
SUBJECTIVE:   Ms. Dariana Reyes is a 70 y.o. female who is here for follow up of routine medical issues.      Pt presents today with left arm pain, it began about a month ago, she woke up one morning with the pain. She hadn't done anything different the days before. She does sleep on her left side. She explains it seems to be more muscular in the bicep area. She states it can ache all day. She states the intensity fluctuates from day to day and throughout the day. She states nothing helps or aggravates the pain. No changes when she exerts herself.  She hasn't tried anything to alleviate the pain.  She explains her muscles do differ from one another. She states this morning she doesn't feel it.      HTN: Patient is compliant in taking olmesartan 20 mg daily, amlodipine 10 mg daily, metoprolol tartrate 25 mg BID, and HCTZ 25 mg daily. Their BP today was 123/63.    DM2: Pt is compliant in taking metformin 500 mg daily. Their last HbA1c was 6.4 on 04/11/2025.    HLD: Pt is compliant in taking rosuvastatin. Their last lipid panel was normal and controlled on 02/28/2025.      At this time, she is otherwise doing well and has brought no other complaints to my attention today.  For a list of the medical issues addressed today, see the assessment and plan below.    PMH:   Past Medical History:   Diagnosis Date    Diabetes (HCC)     Hypercholesterolemia     Hypertension     Murmur     Other ill-defined conditions(799.89)     high cholesterol     PSH:  has a past surgical history that includes Rotator cuff repair (10/2017); Tubal ligation; orthopedic surgery; and Hysterectomy (3/25/99).    All: is allergic to lisinopril and oxycodone-acetaminophen.   MEDS:   Current Outpatient Medications   Medication Sig    blood glucose monitor strips Test one times a day & as needed for symptoms of irregular blood glucose. Dispense sufficient amount for indicated testing frequency plus additional to accommodate PRN testing needs.

## 2025-06-16 ASSESSMENT — ENCOUNTER SYMPTOMS: SHORTNESS OF BREATH: 0

## 2025-06-16 NOTE — PROGRESS NOTES
HISTORY OF PRESENT ILLNESS  Dariana Reyes is a 70 y.o. female.  HPI    Pt of Dr Thompson, here for acute care.    She has had back spasms and neck tension x 1 week. It has improved a bit, but ROM is still limited. She has R lower back and side pain, noting it hurts to tilt to the side. There is no rash and it's not tender to palpation. She's been taking tums, but no tylenol or ibuprofen. She denies injury and physical exertion.  She had similar symptoms several years ago     she likely has a pinched nerve, rx'd medrol dosepak and warned her that it may increase her BS slightly, so she should take 2 metformin while on medrol dosepak x 5 days. Also rx'd muscle relaxer for evening use, discussed drowsiness side effect, and advised to use heating pad instead of ice.    Patient is diabetic currently on metformin 500 mg once daily.  Last labs from April 2025 A1c was 6.4    Kidney function normal    Has been well-controlledBlood pressure well-controlled on Norvasc and hydrochlorothiazide continue no change dose        Patient Active Problem List   Diagnosis    Diabetes mellitus (HCC)    HLD (hyperlipidemia)    History of hysterectomy including cervix    Menopausal and postmenopausal disorder    HTN (hypertension)     Current Outpatient Medications   Medication Sig Dispense Refill    blood glucose monitor strips Test one times a day & as needed for symptoms of irregular blood glucose. Dispense sufficient amount for indicated testing frequency plus additional to accommodate PRN testing needs. 100 strip 11    hydroCHLOROthiazide (HYDRODIURIL) 25 MG tablet TAKE 1 TABLET BY MOUTH EVERY DAY 90 tablet 2    metoprolol tartrate (LOPRESSOR) 25 MG tablet TAKE 1 TABLET BY MOUTH TWICE A  tablet 1    metFORMIN (GLUCOPHAGE) 500 MG tablet TAKE 1 TABLET BY MOUTH EVERY DAY 90 tablet 1    amLODIPine (NORVASC) 10 MG tablet TAKE 1 TABLET BY MOUTH EVERY DAY 90 tablet 4    olmesartan (BENICAR) 20 MG tablet TAKE ONE TAB BY MOUTH DAILY 90

## 2025-06-17 ENCOUNTER — OFFICE VISIT (OUTPATIENT)
Age: 70
End: 2025-06-17
Payer: MEDICARE

## 2025-06-17 VITALS
HEART RATE: 89 BPM | SYSTOLIC BLOOD PRESSURE: 103 MMHG | TEMPERATURE: 97.5 F | WEIGHT: 183.5 LBS | BODY MASS INDEX: 30.57 KG/M2 | OXYGEN SATURATION: 98 % | HEIGHT: 65 IN | DIASTOLIC BLOOD PRESSURE: 62 MMHG

## 2025-06-17 DIAGNOSIS — I10 PRIMARY HYPERTENSION: ICD-10-CM

## 2025-06-17 DIAGNOSIS — M54.6 ACUTE BILATERAL THORACIC BACK PAIN: ICD-10-CM

## 2025-06-17 DIAGNOSIS — E11.9 TYPE 2 DIABETES MELLITUS WITHOUT COMPLICATION, WITHOUT LONG-TERM CURRENT USE OF INSULIN (HCC): ICD-10-CM

## 2025-06-17 DIAGNOSIS — M54.2 NECK PAIN: Primary | ICD-10-CM

## 2025-06-17 PROCEDURE — 3074F SYST BP LT 130 MM HG: CPT | Performed by: INTERNAL MEDICINE

## 2025-06-17 PROCEDURE — 3044F HG A1C LEVEL LT 7.0%: CPT | Performed by: INTERNAL MEDICINE

## 2025-06-17 PROCEDURE — 99214 OFFICE O/P EST MOD 30 MIN: CPT | Performed by: INTERNAL MEDICINE

## 2025-06-17 PROCEDURE — 1160F RVW MEDS BY RX/DR IN RCRD: CPT | Performed by: INTERNAL MEDICINE

## 2025-06-17 PROCEDURE — 3078F DIAST BP <80 MM HG: CPT | Performed by: INTERNAL MEDICINE

## 2025-06-17 PROCEDURE — 1159F MED LIST DOCD IN RCRD: CPT | Performed by: INTERNAL MEDICINE

## 2025-06-17 PROCEDURE — 1123F ACP DISCUSS/DSCN MKR DOCD: CPT | Performed by: INTERNAL MEDICINE

## 2025-06-17 RX ORDER — METHYLPREDNISOLONE 4 MG/1
TABLET ORAL
Qty: 1 KIT | Refills: 0 | Status: SHIPPED | OUTPATIENT
Start: 2025-06-17 | End: 2025-06-23

## 2025-06-17 RX ORDER — TIZANIDINE 2 MG/1
2 TABLET ORAL
Qty: 20 TABLET | Refills: 0 | Status: SHIPPED | OUTPATIENT
Start: 2025-06-17

## 2025-06-17 ASSESSMENT — PATIENT HEALTH QUESTIONNAIRE - PHQ9
2. FEELING DOWN, DEPRESSED OR HOPELESS: NOT AT ALL
SUM OF ALL RESPONSES TO PHQ QUESTIONS 1-9: 0
1. LITTLE INTEREST OR PLEASURE IN DOING THINGS: NOT AT ALL

## 2025-06-19 DIAGNOSIS — I10 PRIMARY HYPERTENSION: ICD-10-CM

## 2025-06-25 RX ORDER — OLMESARTAN MEDOXOMIL 20 MG/1
20 TABLET ORAL DAILY
Qty: 90 TABLET | Refills: 2 | Status: SHIPPED | OUTPATIENT
Start: 2025-06-25

## 2025-07-21 DIAGNOSIS — E78.2 MIXED HYPERLIPIDEMIA: ICD-10-CM

## 2025-07-24 RX ORDER — ROSUVASTATIN CALCIUM 10 MG/1
10 TABLET, COATED ORAL EVERY EVENING
Qty: 90 TABLET | Refills: 4 | Status: SHIPPED | OUTPATIENT
Start: 2025-07-24